# Patient Record
Sex: FEMALE | Race: WHITE | NOT HISPANIC OR LATINO | URBAN - METROPOLITAN AREA
[De-identification: names, ages, dates, MRNs, and addresses within clinical notes are randomized per-mention and may not be internally consistent; named-entity substitution may affect disease eponyms.]

---

## 2017-05-03 ENCOUNTER — EMERGENCY (EMERGENCY)
Facility: HOSPITAL | Age: 47
LOS: 0 days | Discharge: HOME | End: 2017-05-03
Attending: EMERGENCY MEDICINE | Admitting: EMERGENCY MEDICINE
Payer: COMMERCIAL

## 2017-05-03 VITALS
HEART RATE: 99 BPM | OXYGEN SATURATION: 95 % | RESPIRATION RATE: 20 BRPM | DIASTOLIC BLOOD PRESSURE: 95 MMHG | TEMPERATURE: 98 F | SYSTOLIC BLOOD PRESSURE: 160 MMHG

## 2017-05-03 VITALS — WEIGHT: 229.94 LBS
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
## 2017-05-03 DIAGNOSIS — S61.213A LACERATION WITHOUT FOREIGN BODY OF LEFT MIDDLE FINGER WITHOUT DAMAGE TO NAIL, INITIAL ENCOUNTER: ICD-10-CM
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
## 2017-05-03 DIAGNOSIS — S61.211A LACERATION WITHOUT FOREIGN BODY OF LEFT INDEX FINGER WITHOUT DAMAGE TO NAIL, INITIAL ENCOUNTER: ICD-10-CM

## 2017-05-03 PROCEDURE — 12002 RPR S/N/AX/GEN/TRNK2.6-7.5CM: CPT

## 2017-05-03 PROCEDURE — 99284 EMERGENCY DEPT VISIT MOD MDM: CPT | Mod: 25

## 2017-05-03 RX ORDER — TETANUS TOXOID, REDUCED DIPHTHERIA TOXOID AND ACELLULAR PERTUSSIS VACCINE, ADSORBED 5; 2.5; 8; 8; 2.5 [IU]/.5ML; [IU]/.5ML; UG/.5ML; UG/.5ML; UG/.5ML
0.5 SUSPENSION INTRAMUSCULAR ONCE
Qty: 0 | Refills: 0 | Status: COMPLETED | OUTPATIENT
Start: 2017-05-03 | End: 2017-05-03

## 2017-05-03 RX ADMIN — TETANUS TOXOID, REDUCED DIPHTHERIA TOXOID AND ACELLULAR PERTUSSIS VACCINE, ADSORBED 0.5 MILLILITER(S): 5; 2.5; 8; 8; 2.5 SUSPENSION INTRAMUSCULAR at 14:18

## 2017-05-03 NOTE — ED STATDOCS - SKIN, MLM
jagged 1 cm lac superficial to left index finger. jagged lac to palmar surface of left 3rd finger. NV intact. motor intact.

## 2017-05-03 NOTE — ED STATDOCS - ATTENDING CONTRIBUTION TO CARE
46f w 1cm lac to L fingers. Neurovascularly intact.  ICooper MD, personally saw the patient with the resident, and completed the key components of the history and physical exam. I then discussed the management plan with the resident.  I, Cooper Stephenson,, performed the initial face to face bedside interview with this patient regarding history of present illness, review of symptoms and relevant past medical, social and family history.  I completed an independent physical examination.  I was the initial provider who evaluated this patient.  The history, relevant review of systems, past medical and surgical history, medical decision making, and physical examination was documented by the scribe in my presence and I attest to the accuracy of the documentation.

## 2017-05-03 NOTE — ED STATDOCS - PROGRESS NOTE DETAILS
47 y/o female with lacerations in her L middle and index fingers. Patient reports mild pain and denies possibility of FO. Tdap given. Will repair lacerations and do wound dressing.

## 2017-05-03 NOTE — ED STATDOCS - OBJECTIVE STATEMENT
47 y/o female presenting to ED for lacerations on left hand when doing gardening work with a tool. Last tetanus is unknown. Pt c/o left hand pain. 45 y/o female presenting to ED for lacerations on left hand when doing gardening work with a tool x1 hour ago. Last tetanus is unknown. Pt c/o left hand pain.

## 2017-05-03 NOTE — ED STATDOCS - NS ED MD SCRIBE ATTENDING SCRIBE SECTIONS
PROGRESS NOTE/PAST MEDICAL/SURGICAL/SOCIAL HISTORY/DISPOSITION/REVIEW OF SYSTEMS/HISTORY OF PRESENT ILLNESS/PHYSICAL EXAM/RESULTS

## 2017-10-18 ENCOUNTER — EMERGENCY (EMERGENCY)
Facility: HOSPITAL | Age: 47
LOS: 0 days | Discharge: HOME | End: 2017-10-18
Attending: EMERGENCY MEDICINE | Admitting: EMERGENCY MEDICINE
Payer: COMMERCIAL

## 2017-10-18 VITALS
SYSTOLIC BLOOD PRESSURE: 142 MMHG | HEIGHT: 63 IN | TEMPERATURE: 98 F | DIASTOLIC BLOOD PRESSURE: 72 MMHG | RESPIRATION RATE: 18 BRPM | OXYGEN SATURATION: 100 % | WEIGHT: 225.09 LBS | HEART RATE: 88 BPM

## 2017-10-18 DIAGNOSIS — S61.511A LACERATION WITHOUT FOREIGN BODY OF RIGHT WRIST, INITIAL ENCOUNTER: ICD-10-CM
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
## 2017-10-18 DIAGNOSIS — F17.210 NICOTINE DEPENDENCE, CIGARETTES, UNCOMPLICATED: ICD-10-CM
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
## 2017-10-18 PROCEDURE — 99283 EMERGENCY DEPT VISIT LOW MDM: CPT | Mod: 25

## 2017-10-18 PROCEDURE — 12001 RPR S/N/AX/GEN/TRNK 2.5CM/<: CPT

## 2017-10-18 NOTE — ED ADULT NURSE NOTE - OBJECTIVE STATEMENT
Pt presented to ED for hand injury. As per pt, pt cut her right palm with unknown object while taking out the garbage tonight. Upon arrival to ED, pt c/o minor pain to the right palm, and 1cm lac with controlled bleeding noted to right palm. No other complains noted at this time. Pt's Tetanus up-to-date.

## 2017-10-18 NOTE — ED ADULT NURSE NOTE - CHPI ED SYMPTOMS NEG
no numbness/no dizziness/no fever/no nausea/no tingling/no chills/no vomiting/no weakness/no decreased eating/drinking

## 2017-10-18 NOTE — ED PROVIDER NOTE - OBJECTIVE STATEMENT
48 yo female presents c/o lac to right hand on glass while taking out the garbage.  States she stopped the bleeding for a short while but it resumed when she moved her hand.  Had recent tdap 5 months ago.  Pt is right hand dominant.  No other injuries.  No recent travel.  PCP Dr. Gamble.

## 2018-04-05 ENCOUNTER — APPOINTMENT (OUTPATIENT)
Dept: BREAST CENTER | Facility: CLINIC | Age: 48
End: 2018-04-05
Payer: COMMERCIAL

## 2018-04-05 VITALS
SYSTOLIC BLOOD PRESSURE: 140 MMHG | BODY MASS INDEX: 39.47 KG/M2 | HEART RATE: 74 BPM | HEIGHT: 62.5 IN | DIASTOLIC BLOOD PRESSURE: 90 MMHG | WEIGHT: 220 LBS

## 2018-04-05 DIAGNOSIS — N63.10 UNSPECIFIED LUMP IN THE RIGHT BREAST, UNSPECIFIED QUADRANT: ICD-10-CM

## 2018-04-05 PROCEDURE — 99242 OFF/OP CONSLTJ NEW/EST SF 20: CPT

## 2018-04-05 RX ORDER — OXYCODONE HYDROCHLORIDE 15 MG/1
15 TABLET ORAL
Qty: 150 | Refills: 0 | Status: ACTIVE | COMMUNITY
Start: 2018-03-12

## 2018-05-10 RX ORDER — METHOCARBAMOL 750 MG/1
750 TABLET, FILM COATED ORAL
Qty: 60 | Refills: 0 | Status: DISCONTINUED | COMMUNITY
Start: 2018-02-09 | End: 2018-05-10

## 2018-05-10 RX ORDER — ETODOLAC 500 MG/1
500 TABLET, FILM COATED ORAL
Qty: 60 | Refills: 0 | Status: DISCONTINUED | COMMUNITY
Start: 2018-01-05 | End: 2018-05-10

## 2018-05-10 RX ORDER — ALPRAZOLAM 0.25 MG/1
0.25 TABLET ORAL
Refills: 0 | Status: ACTIVE | COMMUNITY

## 2018-05-10 RX ORDER — ETODOLAC 400 MG/1
400 TABLET, FILM COATED ORAL
Qty: 60 | Refills: 0 | Status: DISCONTINUED | COMMUNITY
Start: 2017-07-19 | End: 2018-05-10

## 2018-05-15 NOTE — ASU PATIENT PROFILE, ADULT - PSH
No significant past surgical history Breast cyst  right  (x2)     (last one)  Carpal tunnel syndrome of left wrist  2006  Carpal tunnel syndrome of right wrist    H/O umbilical hernia repair  2017  H/O: hysterectomy  2017  History of cholecystectomy  1992  History of lumbar fusion    S/P   ,

## 2018-05-16 ENCOUNTER — APPOINTMENT (OUTPATIENT)
Dept: BREAST CENTER | Facility: HOSPITAL | Age: 48
End: 2018-05-16
Payer: COMMERCIAL

## 2018-05-16 ENCOUNTER — RESULT REVIEW (OUTPATIENT)
Age: 48
End: 2018-05-16

## 2018-05-16 ENCOUNTER — OUTPATIENT (OUTPATIENT)
Dept: PREADMISSION | Facility: HOSPITAL | Age: 48
LOS: 1 days | Discharge: HOME | End: 2018-05-16
Payer: COMMERCIAL

## 2018-05-16 VITALS
TEMPERATURE: 98 F | DIASTOLIC BLOOD PRESSURE: 82 MMHG | OXYGEN SATURATION: 98 % | HEART RATE: 82 BPM | RESPIRATION RATE: 18 BRPM | SYSTOLIC BLOOD PRESSURE: 122 MMHG

## 2018-05-16 VITALS
HEIGHT: 62.5 IN | WEIGHT: 229.94 LBS | DIASTOLIC BLOOD PRESSURE: 75 MMHG | OXYGEN SATURATION: 97 % | TEMPERATURE: 98 F | RESPIRATION RATE: 16 BRPM | HEART RATE: 75 BPM | SYSTOLIC BLOOD PRESSURE: 115 MMHG

## 2018-05-16 DIAGNOSIS — Z90.710 ACQUIRED ABSENCE OF BOTH CERVIX AND UTERUS: Chronic | ICD-10-CM

## 2018-05-16 DIAGNOSIS — Z98.1 ARTHRODESIS STATUS: Chronic | ICD-10-CM

## 2018-05-16 DIAGNOSIS — G56.02 CARPAL TUNNEL SYNDROME, LEFT UPPER LIMB: Chronic | ICD-10-CM

## 2018-05-16 DIAGNOSIS — Z98.891 HISTORY OF UTERINE SCAR FROM PREVIOUS SURGERY: Chronic | ICD-10-CM

## 2018-05-16 DIAGNOSIS — N60.09 SOLITARY CYST OF UNSPECIFIED BREAST: Chronic | ICD-10-CM

## 2018-05-16 DIAGNOSIS — Z90.49 ACQUIRED ABSENCE OF OTHER SPECIFIED PARTS OF DIGESTIVE TRACT: Chronic | ICD-10-CM

## 2018-05-16 DIAGNOSIS — G56.01 CARPAL TUNNEL SYNDROME, RIGHT UPPER LIMB: Chronic | ICD-10-CM

## 2018-05-16 DIAGNOSIS — Z98.890 OTHER SPECIFIED POSTPROCEDURAL STATES: Chronic | ICD-10-CM

## 2018-05-16 PROCEDURE — 19120 REMOVAL OF BREAST LESION: CPT

## 2018-05-16 PROCEDURE — 88304 TISSUE EXAM BY PATHOLOGIST: CPT | Mod: 26

## 2018-05-16 PROCEDURE — 14000 TIS TRNFR TRUNK 10 SQ CM/<: CPT

## 2018-05-16 RX ORDER — FENTANYL CITRATE 50 UG/ML
50 INJECTION INTRAVENOUS
Qty: 0 | Refills: 0 | Status: DISCONTINUED | OUTPATIENT
Start: 2018-05-16 | End: 2018-05-16

## 2018-05-16 RX ORDER — ONDANSETRON 8 MG/1
4 TABLET, FILM COATED ORAL ONCE
Qty: 0 | Refills: 0 | Status: DISCONTINUED | OUTPATIENT
Start: 2018-05-16 | End: 2018-05-16

## 2018-05-16 RX ORDER — HYDROMORPHONE HYDROCHLORIDE 2 MG/ML
0.5 INJECTION INTRAMUSCULAR; INTRAVENOUS; SUBCUTANEOUS
Qty: 0 | Refills: 0 | Status: DISCONTINUED | OUTPATIENT
Start: 2018-05-16 | End: 2018-05-16

## 2018-05-16 RX ORDER — AMITRIPTYLINE HCL 25 MG
1 TABLET ORAL
Qty: 0 | Refills: 0 | COMMUNITY

## 2018-05-16 RX ORDER — SODIUM CHLORIDE 9 MG/ML
1000 INJECTION, SOLUTION INTRAVENOUS
Qty: 0 | Refills: 0 | Status: DISCONTINUED | OUTPATIENT
Start: 2018-05-16 | End: 2018-05-16

## 2018-05-16 RX ORDER — OXYCODONE HYDROCHLORIDE 5 MG/1
15 TABLET ORAL EVERY 6 HOURS
Qty: 0 | Refills: 0 | Status: DISCONTINUED | OUTPATIENT
Start: 2018-05-16 | End: 2018-05-16

## 2018-05-16 RX ORDER — GABAPENTIN 400 MG/1
1 CAPSULE ORAL
Qty: 0 | Refills: 0 | COMMUNITY

## 2018-05-16 RX ADMIN — OXYCODONE HYDROCHLORIDE 15 MILLIGRAM(S): 5 TABLET ORAL at 14:50

## 2018-05-16 RX ADMIN — HYDROMORPHONE HYDROCHLORIDE 0.5 MILLIGRAM(S): 2 INJECTION INTRAMUSCULAR; INTRAVENOUS; SUBCUTANEOUS at 14:00

## 2018-05-16 RX ADMIN — HYDROMORPHONE HYDROCHLORIDE 0.5 MILLIGRAM(S): 2 INJECTION INTRAMUSCULAR; INTRAVENOUS; SUBCUTANEOUS at 13:47

## 2018-05-16 RX ADMIN — HYDROMORPHONE HYDROCHLORIDE 0.5 MILLIGRAM(S): 2 INJECTION INTRAMUSCULAR; INTRAVENOUS; SUBCUTANEOUS at 14:50

## 2018-05-16 RX ADMIN — HYDROMORPHONE HYDROCHLORIDE 0.5 MILLIGRAM(S): 2 INJECTION INTRAMUSCULAR; INTRAVENOUS; SUBCUTANEOUS at 13:57

## 2018-05-16 RX ADMIN — OXYCODONE HYDROCHLORIDE 15 MILLIGRAM(S): 5 TABLET ORAL at 13:48

## 2018-05-16 NOTE — ASU DISCHARGE PLAN (ADULT/PEDIATRIC). - MEDICATION SUMMARY - MEDICATIONS TO STOP TAKING
I will STOP taking the medications listed below when I get home from the hospital:    Percocet 10/325 oral tablet  -- 1 tab(s) by mouth every 6 hours, As Needed

## 2018-05-16 NOTE — ASU DISCHARGE PLAN (ADULT/PEDIATRIC). - NURSING INSTRUCTIONS
Patient to refrain from Aspirin, Motrin and Aleve Patient to refrain from Aspirin, Motrin and Aleve  Begin with liquids and light food ( tea, toast, Jello, soups). Advance to what you normally eat. Liquids should taken in adequate amounts today.

## 2018-05-16 NOTE — ASU DISCHARGE PLAN (ADULT/PEDIATRIC). - MEDICATION SUMMARY - MEDICATIONS TO TAKE
I will START or STAY ON the medications listed below when I get home from the hospital:    oxyCODONE 15 mg oral tablet  -- orally every 4 hours, As Needed  -- Indication: For pain    Compazine  -- 4 milligram(s) by mouth once a day, As Needed  -- Indication: For Constipation    Xanax 0.5 mg oral tablet  -- 1 tab(s) by mouth 2 times a day  -- Indication: For Anxiety

## 2018-05-16 NOTE — BRIEF OPERATIVE NOTE - PROCEDURE
<<-----Click on this checkbox to enter Procedure Wide excision lesion of torso  05/16/2018  right breast/chest wall  Active  AMISHKIT

## 2018-05-21 LAB — SURGICAL PATHOLOGY FINAL REPORT - CH: SIGNIFICANT CHANGE UP

## 2018-05-23 PROBLEM — D17.1 LIPOMA OF BREAST: Status: ACTIVE | Noted: 2018-05-23

## 2018-05-24 ENCOUNTER — APPOINTMENT (OUTPATIENT)
Dept: BREAST CENTER | Facility: CLINIC | Age: 48
End: 2018-05-24
Payer: COMMERCIAL

## 2018-05-24 DIAGNOSIS — M79.7 FIBROMYALGIA: ICD-10-CM

## 2018-05-24 DIAGNOSIS — N63.10 UNSPECIFIED LUMP IN THE RIGHT BREAST, UNSPECIFIED QUADRANT: ICD-10-CM

## 2018-05-24 DIAGNOSIS — M19.90 UNSPECIFIED OSTEOARTHRITIS, UNSPECIFIED SITE: ICD-10-CM

## 2018-05-24 DIAGNOSIS — G89.29 OTHER CHRONIC PAIN: ICD-10-CM

## 2018-05-24 DIAGNOSIS — F17.200 NICOTINE DEPENDENCE, UNSPECIFIED, UNCOMPLICATED: ICD-10-CM

## 2018-05-24 DIAGNOSIS — E66.01 MORBID (SEVERE) OBESITY DUE TO EXCESS CALORIES: ICD-10-CM

## 2018-05-24 DIAGNOSIS — D17.1 BENIGN LIPOMATOUS NEOPLASM OF SKIN AND SUBCUTANEOUS TISSUE OF TRUNK: ICD-10-CM

## 2018-05-24 DIAGNOSIS — Z80.1 FAMILY HISTORY OF MALIGNANT NEOPLASM OF TRACHEA, BRONCHUS AND LUNG: ICD-10-CM

## 2018-05-24 DIAGNOSIS — Z82.49 FAMILY HISTORY OF ISCHEMIC HEART DISEASE AND OTHER DISEASES OF THE CIRCULATORY SYSTEM: ICD-10-CM

## 2018-05-24 DIAGNOSIS — M54.5 LOW BACK PAIN: ICD-10-CM

## 2018-05-24 DIAGNOSIS — F41.9 ANXIETY DISORDER, UNSPECIFIED: ICD-10-CM

## 2018-05-24 PROCEDURE — 99024 POSTOP FOLLOW-UP VISIT: CPT

## 2018-12-12 NOTE — ED ADULT NURSE NOTE - ATTEMPT TO OOB
----- Message from Izabela Shrestha RN sent at 12/12/2018  9:08 AM CST -----  Please call patient to let her know.   Thanks.  ----- Message -----  From: Emmanuel MORRISON DO  Sent: 12/11/2018   5:24 PM  To: , #    ldl at goal, cpm     no

## 2019-01-31 ENCOUNTER — EMERGENCY (EMERGENCY)
Facility: HOSPITAL | Age: 49
LOS: 0 days | Discharge: HOME | End: 2019-01-31
Attending: EMERGENCY MEDICINE | Admitting: EMERGENCY MEDICINE
Payer: COMMERCIAL

## 2019-01-31 VITALS
HEART RATE: 97 BPM | SYSTOLIC BLOOD PRESSURE: 150 MMHG | RESPIRATION RATE: 18 BRPM | OXYGEN SATURATION: 97 % | DIASTOLIC BLOOD PRESSURE: 94 MMHG | TEMPERATURE: 98 F

## 2019-01-31 VITALS — WEIGHT: 220.02 LBS

## 2019-01-31 DIAGNOSIS — Z90.710 ACQUIRED ABSENCE OF BOTH CERVIX AND UTERUS: ICD-10-CM

## 2019-01-31 DIAGNOSIS — R11.2 NAUSEA WITH VOMITING, UNSPECIFIED: ICD-10-CM

## 2019-01-31 DIAGNOSIS — Z88.5 ALLERGY STATUS TO NARCOTIC AGENT: ICD-10-CM

## 2019-01-31 DIAGNOSIS — M79.7 FIBROMYALGIA: ICD-10-CM

## 2019-01-31 DIAGNOSIS — Z98.890 OTHER SPECIFIED POSTPROCEDURAL STATES: Chronic | ICD-10-CM

## 2019-01-31 DIAGNOSIS — G56.01 CARPAL TUNNEL SYNDROME, RIGHT UPPER LIMB: Chronic | ICD-10-CM

## 2019-01-31 DIAGNOSIS — Z90.49 ACQUIRED ABSENCE OF OTHER SPECIFIED PARTS OF DIGESTIVE TRACT: Chronic | ICD-10-CM

## 2019-01-31 DIAGNOSIS — F41.9 ANXIETY DISORDER, UNSPECIFIED: ICD-10-CM

## 2019-01-31 DIAGNOSIS — N60.09 SOLITARY CYST OF UNSPECIFIED BREAST: Chronic | ICD-10-CM

## 2019-01-31 DIAGNOSIS — E87.6 HYPOKALEMIA: ICD-10-CM

## 2019-01-31 DIAGNOSIS — R10.9 UNSPECIFIED ABDOMINAL PAIN: ICD-10-CM

## 2019-01-31 DIAGNOSIS — Z98.1 ARTHRODESIS STATUS: Chronic | ICD-10-CM

## 2019-01-31 DIAGNOSIS — G56.02 CARPAL TUNNEL SYNDROME, LEFT UPPER LIMB: Chronic | ICD-10-CM

## 2019-01-31 DIAGNOSIS — Z98.891 HISTORY OF UTERINE SCAR FROM PREVIOUS SURGERY: Chronic | ICD-10-CM

## 2019-01-31 DIAGNOSIS — Z90.710 ACQUIRED ABSENCE OF BOTH CERVIX AND UTERUS: Chronic | ICD-10-CM

## 2019-01-31 PROBLEM — N63.0 UNSPECIFIED LUMP IN UNSPECIFIED BREAST: Chronic | Status: ACTIVE | Noted: 2018-05-15

## 2019-01-31 PROBLEM — M19.90 UNSPECIFIED OSTEOARTHRITIS, UNSPECIFIED SITE: Chronic | Status: ACTIVE | Noted: 2018-05-15

## 2019-01-31 LAB
ALBUMIN SERPL ELPH-MCNC: 4.3 G/DL — SIGNIFICANT CHANGE UP (ref 3.3–5)
ALP SERPL-CCNC: 77 U/L — SIGNIFICANT CHANGE UP (ref 40–120)
ALT FLD-CCNC: 22 U/L — SIGNIFICANT CHANGE UP (ref 12–78)
ANION GAP SERPL CALC-SCNC: 7 MMOL/L — SIGNIFICANT CHANGE UP (ref 5–17)
APPEARANCE UR: ABNORMAL
AST SERPL-CCNC: 12 U/L — LOW (ref 15–37)
BACTERIA # UR AUTO: ABNORMAL
BASOPHILS # BLD AUTO: 0.04 K/UL — SIGNIFICANT CHANGE UP (ref 0–0.2)
BASOPHILS NFR BLD AUTO: 0.2 % — SIGNIFICANT CHANGE UP (ref 0–2)
BILIRUB SERPL-MCNC: 0.5 MG/DL — SIGNIFICANT CHANGE UP (ref 0.2–1.2)
BILIRUB UR-MCNC: NEGATIVE — SIGNIFICANT CHANGE UP
BUN SERPL-MCNC: 14 MG/DL — SIGNIFICANT CHANGE UP (ref 7–23)
CALCIUM SERPL-MCNC: 9.3 MG/DL — SIGNIFICANT CHANGE UP (ref 8.5–10.1)
CHLORIDE SERPL-SCNC: 104 MMOL/L — SIGNIFICANT CHANGE UP (ref 96–108)
CO2 SERPL-SCNC: 29 MMOL/L — SIGNIFICANT CHANGE UP (ref 22–31)
COLOR SPEC: YELLOW — SIGNIFICANT CHANGE UP
COMMENT - URINE: SIGNIFICANT CHANGE UP
CREAT SERPL-MCNC: 0.73 MG/DL — SIGNIFICANT CHANGE UP (ref 0.5–1.3)
DIFF PNL FLD: ABNORMAL
EOSINOPHIL # BLD AUTO: 0 K/UL — SIGNIFICANT CHANGE UP (ref 0–0.5)
EOSINOPHIL NFR BLD AUTO: 0 % — SIGNIFICANT CHANGE UP (ref 0–6)
EPI CELLS # UR: ABNORMAL
GLUCOSE SERPL-MCNC: 124 MG/DL — HIGH (ref 70–99)
GLUCOSE UR QL: NEGATIVE MG/DL — SIGNIFICANT CHANGE UP
HCT VFR BLD CALC: 45.3 % — HIGH (ref 34.5–45)
HGB BLD-MCNC: 15.4 G/DL — SIGNIFICANT CHANGE UP (ref 11.5–15.5)
IMM GRANULOCYTES NFR BLD AUTO: 0.6 % — SIGNIFICANT CHANGE UP (ref 0–1.5)
KETONES UR-MCNC: ABNORMAL
LEUKOCYTE ESTERASE UR-ACNC: ABNORMAL
LIDOCAIN IGE QN: 118 U/L — SIGNIFICANT CHANGE UP (ref 73–393)
LYMPHOCYTES # BLD AUTO: 10 % — LOW (ref 13–44)
LYMPHOCYTES # BLD AUTO: 2.2 K/UL — SIGNIFICANT CHANGE UP (ref 1–3.3)
MCHC RBC-ENTMCNC: 29.3 PG — SIGNIFICANT CHANGE UP (ref 27–34)
MCHC RBC-ENTMCNC: 34 GM/DL — SIGNIFICANT CHANGE UP (ref 32–36)
MCV RBC AUTO: 86.1 FL — SIGNIFICANT CHANGE UP (ref 80–100)
MONOCYTES # BLD AUTO: 1.32 K/UL — HIGH (ref 0–0.9)
MONOCYTES NFR BLD AUTO: 6 % — SIGNIFICANT CHANGE UP (ref 2–14)
NEUTROPHILS # BLD AUTO: 18.27 K/UL — HIGH (ref 1.8–7.4)
NEUTROPHILS NFR BLD AUTO: 83.2 % — HIGH (ref 43–77)
NITRITE UR-MCNC: NEGATIVE — SIGNIFICANT CHANGE UP
NRBC # BLD: 0 /100 WBCS — SIGNIFICANT CHANGE UP (ref 0–0)
PH UR: 5 — SIGNIFICANT CHANGE UP (ref 5–8)
PLATELET # BLD AUTO: 399 K/UL — SIGNIFICANT CHANGE UP (ref 150–400)
POTASSIUM SERPL-MCNC: 3 MMOL/L — LOW (ref 3.5–5.3)
POTASSIUM SERPL-SCNC: 3 MMOL/L — LOW (ref 3.5–5.3)
PROT SERPL-MCNC: 8.2 GM/DL — SIGNIFICANT CHANGE UP (ref 6–8.3)
PROT UR-MCNC: 30 MG/DL
RBC # BLD: 5.26 M/UL — HIGH (ref 3.8–5.2)
RBC # FLD: 13.5 % — SIGNIFICANT CHANGE UP (ref 10.3–14.5)
RBC CASTS # UR COMP ASSIST: SIGNIFICANT CHANGE UP /HPF (ref 0–4)
SODIUM SERPL-SCNC: 140 MMOL/L — SIGNIFICANT CHANGE UP (ref 135–145)
SP GR SPEC: 1.02 — SIGNIFICANT CHANGE UP (ref 1.01–1.02)
UROBILINOGEN FLD QL: 1 MG/DL
WBC # BLD: 21.96 K/UL — HIGH (ref 3.8–10.5)
WBC # FLD AUTO: 21.96 K/UL — HIGH (ref 3.8–10.5)
WBC UR QL: SIGNIFICANT CHANGE UP

## 2019-01-31 PROCEDURE — 99285 EMERGENCY DEPT VISIT HI MDM: CPT | Mod: 25

## 2019-01-31 PROCEDURE — 74177 CT ABD & PELVIS W/CONTRAST: CPT | Mod: 26

## 2019-01-31 RX ORDER — ONDANSETRON 8 MG/1
4 TABLET, FILM COATED ORAL ONCE
Qty: 0 | Refills: 0 | Status: DISCONTINUED | OUTPATIENT
Start: 2019-01-31 | End: 2019-01-31

## 2019-01-31 RX ORDER — SODIUM CHLORIDE 9 MG/ML
1000 INJECTION, SOLUTION INTRAVENOUS
Qty: 0 | Refills: 0 | Status: DISCONTINUED | OUTPATIENT
Start: 2019-01-31 | End: 2019-01-31

## 2019-01-31 RX ORDER — SODIUM CHLORIDE 9 MG/ML
1000 INJECTION INTRAMUSCULAR; INTRAVENOUS; SUBCUTANEOUS ONCE
Qty: 0 | Refills: 0 | Status: COMPLETED | OUTPATIENT
Start: 2019-01-31 | End: 2019-01-31

## 2019-01-31 RX ORDER — POTASSIUM CHLORIDE 20 MEQ
10 PACKET (EA) ORAL
Qty: 0 | Refills: 0 | Status: COMPLETED | OUTPATIENT
Start: 2019-01-31 | End: 2019-01-31

## 2019-01-31 RX ORDER — DIPHENHYDRAMINE HCL 50 MG
25 CAPSULE ORAL ONCE
Qty: 0 | Refills: 0 | Status: COMPLETED | OUTPATIENT
Start: 2019-01-31 | End: 2019-01-31

## 2019-01-31 RX ORDER — PROCHLORPERAZINE MALEATE 5 MG
10 TABLET ORAL ONCE
Qty: 0 | Refills: 0 | Status: COMPLETED | OUTPATIENT
Start: 2019-01-31 | End: 2019-01-31

## 2019-01-31 RX ADMIN — SODIUM CHLORIDE 1000 MILLILITER(S): 9 INJECTION, SOLUTION INTRAVENOUS at 16:21

## 2019-01-31 RX ADMIN — Medication 25 MILLIGRAM(S): at 14:29

## 2019-01-31 RX ADMIN — Medication 10 MILLIEQUIVALENT(S): at 18:23

## 2019-01-31 RX ADMIN — SODIUM CHLORIDE 1000 MILLILITER(S): 9 INJECTION INTRAMUSCULAR; INTRAVENOUS; SUBCUTANEOUS at 13:55

## 2019-01-31 RX ADMIN — SODIUM CHLORIDE 1000 MILLILITER(S): 9 INJECTION INTRAMUSCULAR; INTRAVENOUS; SUBCUTANEOUS at 15:27

## 2019-01-31 RX ADMIN — Medication 100 MILLIEQUIVALENT(S): at 16:20

## 2019-01-31 RX ADMIN — Medication 10 MILLIGRAM(S): at 14:26

## 2019-01-31 RX ADMIN — Medication 101 MILLIGRAM(S): at 14:27

## 2019-01-31 RX ADMIN — SODIUM CHLORIDE 1000 MILLILITER(S): 9 INJECTION, SOLUTION INTRAVENOUS at 18:23

## 2019-01-31 NOTE — ED PROVIDER NOTE - MEDICAL DECISION MAKING DETAILS
Pt presenting with abd pain, NV, similar to multiple episodes of cyclical vomiting. Pt is well appearing, soft abd, no distention. Will order labs, hydrate, tx for cyclical vomiting and reassess. Will hold imaging and opioids at this time.

## 2019-01-31 NOTE — ED PROVIDER NOTE - NSFOLLOWUPINSTRUCTIONS_ED_ALL_ED_FT
1. return for worsening symptoms or anything concerning to you  2. take all home meds as prescribed  3. follow up with your pmd call to make an appointment  4. follow up with your GI call to make an appointment     Nausea / Vomiting    Nausea is the feeling that you have to vomit. As nausea gets worse, it can lead to vomiting. Vomiting puts you at an increased risk for dehydration. Older adults and people with other diseases or a weak immune system are at higher risk for dehydration. Drink clear fluids in small but frequent amounts as tolerated. Eat bland, easy-to-digest foods in small amounts as tolerated.    SEEK IMMEDIATE MEDICAL CARE IF YOU HAVE ANY OF THE FOLLOWING SYMPTOMS: fever, inability to keep sufficient fluids down, black or bloody vomitus, black or bloody stools, lightheadedness/dizziness, chest pain, severe headache, rash, shortness of breath, cold or clammy skin, confusion, pain with urination, or any signs of dehydration.

## 2019-01-31 NOTE — ED STATDOCS - PSH
Breast cyst  right  (x2)     (last one)  Carpal tunnel syndrome of left wrist  2006  Carpal tunnel syndrome of right wrist    H/O umbilical hernia repair  2017  H/O: hysterectomy  2017  History of cholecystectomy  1992  History of lumbar fusion    S/P   ,

## 2019-01-31 NOTE — ED STATDOCS - PROGRESS NOTE DETAILS
Rosie Mcneill for ED attending Dr. Nance: 49 y/o f with PMHx of anxiety, arthritis, fibromyalgia, s/p hysterectomy, cholecystectomy, umbilical hernia repair presenting to the ED c/o abd pain, n/v/d since last night. Spoke to GI: Dr. Childs today who recommended pt come to ED where he will see her. Hx of similar sx in the past. Denies fever, chills, any other acute c/o. Will send pt to main ED for further evaluation. Rosie Mcneill for ED attending Dr. Nance: 49 y/o f with PMHx of anxiety, arthritis, fibromyalgia, s/p hysterectomy, cholecystectomy, umbilical hernia repair presenting to the ED c/o abd pain, n/v/d since last night. Hx of cyclic vomiting with multiple admissions in past; Spoke to GI: Dr. Childs today who recommended pt come to ED where he will see her. Hx of similar sx in the past. Denies fever, chills, any other acute c/o. Will send pt to main ED for further evaluation.

## 2019-01-31 NOTE — ED ADULT NURSE NOTE - NSIMPLEMENTINTERV_GEN_ALL_ED
Implemented All Universal Safety Interventions:  [place] to call system. Call bell, personal items and telephone within reach. Instruct patient to call for assistance. Room bathroom lighting operational. Non-slip footwear when patient is off stretcher. Physically safe environment: no spills, clutter or unnecessary equipment. Stretcher in lowest position, wheels locked, appropriate side rails in place.

## 2019-01-31 NOTE — ED PROVIDER NOTE - CARE PLAN
Principal Discharge DX:	Vomiting  Assessment and plan of treatment:	1. return for worsening symptoms or anything concerning to you  2. take all home meds as prescribed  3. follow up with your pmd call to make an appointment  4. follow up with your GI call to make an appointment     Nausea / Vomiting    Nausea is the feeling that you have to vomit. As nausea gets worse, it can lead to vomiting. Vomiting puts you at an increased risk for dehydration. Older adults and people with other diseases or a weak immune system are at higher risk for dehydration. Drink clear fluids in small but frequent amounts as tolerated. Eat bland, easy-to-digest foods in small amounts as tolerated.    SEEK IMMEDIATE MEDICAL CARE IF YOU HAVE ANY OF THE FOLLOWING SYMPTOMS: fever, inability to keep sufficient fluids down, black or bloody vomitus, black or bloody stools, lightheadedness/dizziness, chest pain, severe headache, rash, shortness of breath, cold or clammy skin, confusion, pain with urination, or any signs of dehydration.

## 2019-01-31 NOTE — ED PROVIDER NOTE - OBJECTIVE STATEMENT
49 y/o female with PMHx of anxiety, arthritis, fibromyalgia, s/p hysterectomy, cholecystectomy, umbilical hernia repair presenting to the ED c/o abd pain, n/v since 7am yesterday morning. Hx of cyclic vomiting x 5 years with multiple admissions in past; Spoke to GI: Dr. Childs today who recommended pt come to ED where he will see her. Pt states that she has medications to help the nausea and pain, but hasn't been able to keep the medication down. Pt usually deals with the NV at home, but today her sister made her come in for evaluation. Sx are similar to past cyclical vomiting episodes. Last BM yesterday morning, no blood. Denies fever, chills, any other acute c/o. Pt on percocet for back pain, last dose that pt was able to keep down was 2 days ago. NKDA.

## 2019-01-31 NOTE — ED PROVIDER NOTE - PROGRESS NOTE DETAILS
PERLA Daniel attest that this documentation has been prepared under the direction and in the presence of TOM Sanchez Rosie BENITES for Dr. TOM Sanchez: Took sign out from Dr. Ware pending labs. Labs are back and showing hypokalemia and WBC count of 22. Spoke with pt who states sx are much worse than normal cyclical vomiting episodes. +abd diffuse TTP. Given pain and elevated WBC, will obtain CT. Pt agrees with pain. Will reassess ct unremarkable. patient feeling better. tolerating PO. VSS. will d/c with follow up gi and strict return precautions. Judson Sanchez M.D., Attending Physician I, Judson Sanchez MD,  performed the initial face to face bedside interview with this patient regarding history of present illness, review of symptoms and relevant past medical, social and family history.  I completed an independent physical examination.  I was the initial provider who evaluated this patient.  The history, relevant review of systems, past medical and surgical history, medical decision making, and physical examination was documented by the scribe in my presence and I attest to the accuracy of the documentation.

## 2019-02-01 LAB
CULTURE RESULTS: SIGNIFICANT CHANGE UP
SPECIMEN SOURCE: SIGNIFICANT CHANGE UP

## 2019-05-25 ENCOUNTER — EMERGENCY (EMERGENCY)
Facility: HOSPITAL | Age: 49
LOS: 0 days | Discharge: HOME | End: 2019-05-26
Attending: FAMILY MEDICINE | Admitting: FAMILY MEDICINE
Payer: COMMERCIAL

## 2019-05-25 VITALS — HEIGHT: 63 IN | WEIGHT: 214.95 LBS

## 2019-05-25 DIAGNOSIS — G56.01 CARPAL TUNNEL SYNDROME, RIGHT UPPER LIMB: Chronic | ICD-10-CM

## 2019-05-25 DIAGNOSIS — Z98.1 ARTHRODESIS STATUS: Chronic | ICD-10-CM
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
## 2019-05-25 DIAGNOSIS — R07.89 OTHER CHEST PAIN: ICD-10-CM

## 2019-05-25 DIAGNOSIS — N60.09 SOLITARY CYST OF UNSPECIFIED BREAST: Chronic | ICD-10-CM

## 2019-05-25 DIAGNOSIS — Z88.5 ALLERGY STATUS TO NARCOTIC AGENT: ICD-10-CM

## 2019-05-25 DIAGNOSIS — Z90.49 ACQUIRED ABSENCE OF OTHER SPECIFIED PARTS OF DIGESTIVE TRACT: Chronic | ICD-10-CM
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
## 2019-05-25 DIAGNOSIS — Z90.710 ACQUIRED ABSENCE OF BOTH CERVIX AND UTERUS: Chronic | ICD-10-CM

## 2019-05-25 DIAGNOSIS — G56.02 CARPAL TUNNEL SYNDROME, LEFT UPPER LIMB: Chronic | ICD-10-CM

## 2019-05-25 DIAGNOSIS — S22.41XA MULTIPLE FRACTURES OF RIBS, RIGHT SIDE, INITIAL ENCOUNTER FOR CLOSED FRACTURE: ICD-10-CM

## 2019-05-25 DIAGNOSIS — Z98.891 HISTORY OF UTERINE SCAR FROM PREVIOUS SURGERY: Chronic | ICD-10-CM

## 2019-05-25 DIAGNOSIS — Z98.890 OTHER SPECIFIED POSTPROCEDURAL STATES: Chronic | ICD-10-CM

## 2019-05-25 PROCEDURE — 99285 EMERGENCY DEPT VISIT HI MDM: CPT | Mod: 25

## 2019-05-25 NOTE — ED ADULT NURSE NOTE - OBJECTIVE STATEMENT
patient presents to the ed complaining of rib pain after falling last night. patient a/w is patent and is in no acute distress or having any difficulty breathing.

## 2019-05-25 NOTE — ED ADULT NURSE NOTE - NSIMPLEMENTINTERV_GEN_ALL_ED
Implemented All Universal Safety Interventions:  [place] to call system. Call bell, personal items and telephone within reach. Instruct patient to call for assistance. Room bathroom lighting operational. Non-slip footwear when patient is off stretcher. Physically safe environment: no spills, clutter or unnecessary equipment. Stretcher in lowest position, wheels locked, appropriate side rails in place.

## 2019-05-25 NOTE — ED ADULT NURSE NOTE - CHPI ED NUR SYMPTOMS NEG
no nausea/no fever/no chills/no decreased eating/drinking/no dizziness/no vomiting/no tingling/no weakness

## 2019-05-25 NOTE — ED ADULT NURSE NOTE - CAS ELECT INFOMATION PROVIDED
DC instructions/Patient agreed to all verbal and written discharge instructions. Patient agreed to follow up with PCP/PMD. Patient education preformed on signs/symptoms to return to the ED. Patient is alert, orientented, and ambulatory at time of discharge

## 2019-05-25 NOTE — ED ADULT TRIAGE NOTE - CHIEF COMPLAINT QUOTE
pt presents to ED with complaints of right rib pain s/p fall at 2200 last night. pt states pain has been getting progressively worsening. no meds PTA.

## 2019-05-26 VITALS
HEART RATE: 98 BPM | OXYGEN SATURATION: 96 % | RESPIRATION RATE: 16 BRPM | SYSTOLIC BLOOD PRESSURE: 151 MMHG | DIASTOLIC BLOOD PRESSURE: 52 MMHG

## 2019-05-26 PROBLEM — G43.A0 CYCLICAL VOMITING, IN MIGRAINE, NOT INTRACTABLE: Chronic | Status: ACTIVE | Noted: 2019-02-01

## 2019-05-26 LAB
ALBUMIN SERPL ELPH-MCNC: 3.7 G/DL — SIGNIFICANT CHANGE UP (ref 3.3–5)
ALP SERPL-CCNC: 76 U/L — SIGNIFICANT CHANGE UP (ref 40–120)
ALT FLD-CCNC: 18 U/L — SIGNIFICANT CHANGE UP (ref 12–78)
ANION GAP SERPL CALC-SCNC: 7 MMOL/L — SIGNIFICANT CHANGE UP (ref 5–17)
APPEARANCE UR: CLEAR — SIGNIFICANT CHANGE UP
APTT BLD: 33.7 SEC — SIGNIFICANT CHANGE UP (ref 27.5–36.3)
AST SERPL-CCNC: 12 U/L — LOW (ref 15–37)
BACTERIA # UR AUTO: ABNORMAL
BASOPHILS # BLD AUTO: 0.04 K/UL — SIGNIFICANT CHANGE UP (ref 0–0.2)
BASOPHILS NFR BLD AUTO: 0.3 % — SIGNIFICANT CHANGE UP (ref 0–2)
BILIRUB SERPL-MCNC: 0.3 MG/DL — SIGNIFICANT CHANGE UP (ref 0.2–1.2)
BILIRUB UR-MCNC: NEGATIVE — SIGNIFICANT CHANGE UP
BLD GP AB SCN SERPL QL: SIGNIFICANT CHANGE UP
BUN SERPL-MCNC: 13 MG/DL — SIGNIFICANT CHANGE UP (ref 7–23)
CALCIUM SERPL-MCNC: 8.8 MG/DL — SIGNIFICANT CHANGE UP (ref 8.5–10.1)
CHLORIDE SERPL-SCNC: 106 MMOL/L — SIGNIFICANT CHANGE UP (ref 96–108)
CO2 SERPL-SCNC: 30 MMOL/L — SIGNIFICANT CHANGE UP (ref 22–31)
COLOR SPEC: YELLOW — SIGNIFICANT CHANGE UP
CREAT SERPL-MCNC: 0.76 MG/DL — SIGNIFICANT CHANGE UP (ref 0.5–1.3)
DIFF PNL FLD: NEGATIVE — SIGNIFICANT CHANGE UP
EOSINOPHIL # BLD AUTO: 0.27 K/UL — SIGNIFICANT CHANGE UP (ref 0–0.5)
EOSINOPHIL NFR BLD AUTO: 2.3 % — SIGNIFICANT CHANGE UP (ref 0–6)
EPI CELLS # UR: ABNORMAL
GLUCOSE SERPL-MCNC: 107 MG/DL — HIGH (ref 70–99)
GLUCOSE UR QL: NEGATIVE MG/DL — SIGNIFICANT CHANGE UP
HCT VFR BLD CALC: 40.6 % — SIGNIFICANT CHANGE UP (ref 34.5–45)
HGB BLD-MCNC: 13.4 G/DL — SIGNIFICANT CHANGE UP (ref 11.5–15.5)
IMM GRANULOCYTES NFR BLD AUTO: 0.7 % — SIGNIFICANT CHANGE UP (ref 0–1.5)
INR BLD: 0.99 RATIO — SIGNIFICANT CHANGE UP (ref 0.88–1.16)
KETONES UR-MCNC: NEGATIVE — SIGNIFICANT CHANGE UP
LEUKOCYTE ESTERASE UR-ACNC: ABNORMAL
LYMPHOCYTES # BLD AUTO: 28.4 % — SIGNIFICANT CHANGE UP (ref 13–44)
LYMPHOCYTES # BLD AUTO: 3.41 K/UL — HIGH (ref 1–3.3)
MCHC RBC-ENTMCNC: 29.6 PG — SIGNIFICANT CHANGE UP (ref 27–34)
MCHC RBC-ENTMCNC: 33 GM/DL — SIGNIFICANT CHANGE UP (ref 32–36)
MCV RBC AUTO: 89.6 FL — SIGNIFICANT CHANGE UP (ref 80–100)
MONOCYTES # BLD AUTO: 0.78 K/UL — SIGNIFICANT CHANGE UP (ref 0–0.9)
MONOCYTES NFR BLD AUTO: 6.5 % — SIGNIFICANT CHANGE UP (ref 2–14)
NEUTROPHILS # BLD AUTO: 7.41 K/UL — HIGH (ref 1.8–7.4)
NEUTROPHILS NFR BLD AUTO: 61.8 % — SIGNIFICANT CHANGE UP (ref 43–77)
NITRITE UR-MCNC: NEGATIVE — SIGNIFICANT CHANGE UP
PH UR: 5 — SIGNIFICANT CHANGE UP (ref 5–8)
PLATELET # BLD AUTO: 297 K/UL — SIGNIFICANT CHANGE UP (ref 150–400)
POTASSIUM SERPL-MCNC: 4.1 MMOL/L — SIGNIFICANT CHANGE UP (ref 3.5–5.3)
POTASSIUM SERPL-SCNC: 4.1 MMOL/L — SIGNIFICANT CHANGE UP (ref 3.5–5.3)
PROT SERPL-MCNC: 6.8 GM/DL — SIGNIFICANT CHANGE UP (ref 6–8.3)
PROT UR-MCNC: NEGATIVE MG/DL — SIGNIFICANT CHANGE UP
PROTHROM AB SERPL-ACNC: 11 SEC — SIGNIFICANT CHANGE UP (ref 10–12.9)
RBC # BLD: 4.53 M/UL — SIGNIFICANT CHANGE UP (ref 3.8–5.2)
RBC # FLD: 13 % — SIGNIFICANT CHANGE UP (ref 10.3–14.5)
RBC CASTS # UR COMP ASSIST: SIGNIFICANT CHANGE UP /HPF (ref 0–4)
SODIUM SERPL-SCNC: 143 MMOL/L — SIGNIFICANT CHANGE UP (ref 135–145)
SP GR SPEC: 1.01 — SIGNIFICANT CHANGE UP (ref 1.01–1.02)
TYPE + AB SCN PNL BLD: SIGNIFICANT CHANGE UP
UROBILINOGEN FLD QL: NEGATIVE MG/DL — SIGNIFICANT CHANGE UP
WBC # BLD: 11.99 K/UL — HIGH (ref 3.8–10.5)
WBC # FLD AUTO: 11.99 K/UL — HIGH (ref 3.8–10.5)
WBC UR QL: SIGNIFICANT CHANGE UP

## 2019-05-26 PROCEDURE — 71260 CT THORAX DX C+: CPT | Mod: 26

## 2019-05-26 PROCEDURE — 74177 CT ABD & PELVIS W/CONTRAST: CPT | Mod: 26

## 2019-05-26 RX ORDER — ONDANSETRON 8 MG/1
4 TABLET, FILM COATED ORAL ONCE
Refills: 0 | Status: COMPLETED | OUTPATIENT
Start: 2019-05-26 | End: 2019-05-26

## 2019-05-26 RX ORDER — HYDROMORPHONE HYDROCHLORIDE 2 MG/ML
1 INJECTION INTRAMUSCULAR; INTRAVENOUS; SUBCUTANEOUS ONCE
Refills: 0 | Status: DISCONTINUED | OUTPATIENT
Start: 2019-05-26 | End: 2019-05-26

## 2019-05-26 RX ORDER — OXYCODONE HYDROCHLORIDE 5 MG/1
10 TABLET ORAL ONCE
Refills: 0 | Status: DISCONTINUED | OUTPATIENT
Start: 2019-05-26 | End: 2019-05-26

## 2019-05-26 RX ORDER — LIDOCAINE 4 G/100G
1 CREAM TOPICAL ONCE
Refills: 0 | Status: COMPLETED | OUTPATIENT
Start: 2019-05-26 | End: 2019-05-26

## 2019-05-26 RX ORDER — SODIUM CHLORIDE 9 MG/ML
1000 INJECTION INTRAMUSCULAR; INTRAVENOUS; SUBCUTANEOUS ONCE
Refills: 0 | Status: COMPLETED | OUTPATIENT
Start: 2019-05-26 | End: 2019-05-26

## 2019-05-26 RX ADMIN — ONDANSETRON 4 MILLIGRAM(S): 8 TABLET, FILM COATED ORAL at 00:16

## 2019-05-26 RX ADMIN — HYDROMORPHONE HYDROCHLORIDE 1 MILLIGRAM(S): 2 INJECTION INTRAMUSCULAR; INTRAVENOUS; SUBCUTANEOUS at 00:16

## 2019-05-26 RX ADMIN — OXYCODONE HYDROCHLORIDE 10 MILLIGRAM(S): 5 TABLET ORAL at 03:09

## 2019-05-26 RX ADMIN — LIDOCAINE 1 PATCH: 4 CREAM TOPICAL at 03:22

## 2019-05-26 RX ADMIN — HYDROMORPHONE HYDROCHLORIDE 1 MILLIGRAM(S): 2 INJECTION INTRAMUSCULAR; INTRAVENOUS; SUBCUTANEOUS at 00:24

## 2019-05-26 RX ADMIN — SODIUM CHLORIDE 1000 MILLILITER(S): 9 INJECTION INTRAMUSCULAR; INTRAVENOUS; SUBCUTANEOUS at 00:24

## 2019-05-26 RX ADMIN — SODIUM CHLORIDE 1000 MILLILITER(S): 9 INJECTION INTRAMUSCULAR; INTRAVENOUS; SUBCUTANEOUS at 01:25

## 2019-05-26 NOTE — ED PROVIDER NOTE - CARE PROVIDER_API CALL
Durga Gamble (DO)  Family Medicine  [address] [place] [address], [place] [address]  Phone: [phone]  Fax: [phone]  Follow Up Time:

## 2019-05-26 NOTE — ED PROVIDER NOTE - NSFOLLOWUPINSTRUCTIONS_ED_ALL_ED_FT
See printout of information on Rib fractures.  Continue pain medications already prescribed to you for chronic pain.  Add lidoderm patches every 12 hours as needed for pain.  Use incentive spirometer 10 times a day for 5 minutes as educated.

## 2019-05-26 NOTE — ED PROVIDER NOTE - PROGRESS NOTE DETAILS
JG:  Received signout from Dr. Aguilar to follow up CT readings.  Pt. with 2 nondisplaced rib fractures.  Patient still with pain but better controlled.  Pt. already prescribed oxycodone and will continue this and will add lidoderm patches and incentive spirometry exercises and see her PMD Dr. Gamble.

## 2019-05-26 NOTE — ED PROVIDER NOTE - CLINICAL SUMMARY MEDICAL DECISION MAKING FREE TEXT BOX
Pt with mechanical fall over dog yesterday already on pain management no head injury or loc now with severee right rib pain and ruq pain. CT, labs, pain meds.

## 2019-05-26 NOTE — ED PROVIDER NOTE - OBJECTIVE STATEMENT
49 yo wf with hx herniated discs on pain management, who fell over her big dog last pm onto her right side. Pt states no loc but felt pain in right lower ribs, rightupper abd area. No n/v/fever. Pain inc with breathing coughing movig pain is sever constant.  Took pain management med prior to arrival with little relief. Nonsmoker nondrinker no illicit drugs.

## 2019-06-03 ENCOUNTER — EMERGENCY (EMERGENCY)
Facility: HOSPITAL | Age: 49
LOS: 0 days | Discharge: HOME | End: 2019-06-03
Attending: EMERGENCY MEDICINE | Admitting: EMERGENCY MEDICINE
Payer: COMMERCIAL

## 2019-06-03 VITALS
SYSTOLIC BLOOD PRESSURE: 139 MMHG | HEIGHT: 63 IN | OXYGEN SATURATION: 95 % | HEART RATE: 100 BPM | RESPIRATION RATE: 16 BRPM | TEMPERATURE: 99 F | WEIGHT: 225.09 LBS | DIASTOLIC BLOOD PRESSURE: 88 MMHG

## 2019-06-03 DIAGNOSIS — G56.01 CARPAL TUNNEL SYNDROME, RIGHT UPPER LIMB: Chronic | ICD-10-CM

## 2019-06-03 DIAGNOSIS — N60.09 SOLITARY CYST OF UNSPECIFIED BREAST: Chronic | ICD-10-CM
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
## 2019-06-03 DIAGNOSIS — G56.02 CARPAL TUNNEL SYNDROME, LEFT UPPER LIMB: Chronic | ICD-10-CM

## 2019-06-03 DIAGNOSIS — Z90.710 ACQUIRED ABSENCE OF BOTH CERVIX AND UTERUS: Chronic | ICD-10-CM
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
## 2019-06-03 DIAGNOSIS — S60.471A: ICD-10-CM

## 2019-06-03 DIAGNOSIS — Z98.1 ARTHRODESIS STATUS: Chronic | ICD-10-CM

## 2019-06-03 DIAGNOSIS — Z98.891 HISTORY OF UTERINE SCAR FROM PREVIOUS SURGERY: Chronic | ICD-10-CM

## 2019-06-03 DIAGNOSIS — Z90.49 ACQUIRED ABSENCE OF OTHER SPECIFIED PARTS OF DIGESTIVE TRACT: Chronic | ICD-10-CM
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
## 2019-06-03 DIAGNOSIS — Z98.890 OTHER SPECIFIED POSTPROCEDURAL STATES: Chronic | ICD-10-CM

## 2019-06-03 DIAGNOSIS — S60.470A OTHER SUPERFICIAL BITE OF RIGHT INDEX FINGER, INITIAL ENCOUNTER: ICD-10-CM

## 2019-06-03 PROCEDURE — 99284 EMERGENCY DEPT VISIT MOD MDM: CPT | Mod: 25

## 2019-06-03 RX ADMIN — Medication 1 TABLET(S): at 23:53

## 2019-06-03 NOTE — ED PROVIDER NOTE - NSFOLLOWUPINSTRUCTIONS_ED_ALL_ED_FT
please follow up with your doctor in 3-5 days.   take medication as prescribed.    return to ED for any concerns

## 2019-06-03 NOTE — ED ADULT TRIAGE NOTE - CHIEF COMPLAINT QUOTE
patient's dog bit her on left index finger, bite marks on base of finger, knuckle, and distal phalanx. reports pain to finger, pain worse with movement. last tdap last year.

## 2019-06-03 NOTE — ED PROVIDER NOTE - OBJECTIVE STATEMENT
49 y/o female in ED c/o dog bite from her puppy tonight.   pt states was playing with her puppy and was holding and swaying a toy when puppy went to get it, her left 2nd finger was bitten by accident.   pt states in ED for eval 47 y/o female in ED c/o dog bite from her puppy tonight.   pt states was playing with her puppy and was holding and swaying a toy when puppy went to get it, her left 2nd finger was bitten by accident.   pt states in ED for eval.   pt denies any fever, HA< cp, sob, n/v/d/abd pain.

## 2019-06-03 NOTE — ED ADULT NURSE NOTE - OBJECTIVE STATEMENT
pt p/w c/o dog bite to right index finger with 3 2 cm lacerations requiring no sutures, bleeding controlled, pt up to date on Tdap.

## 2019-06-17 ENCOUNTER — INPATIENT (INPATIENT)
Facility: HOSPITAL | Age: 49
LOS: 2 days | Discharge: HOME | End: 2019-06-20
Attending: FAMILY MEDICINE | Admitting: INTERNAL MEDICINE
Payer: COMMERCIAL

## 2019-06-17 VITALS
SYSTOLIC BLOOD PRESSURE: 156 MMHG | RESPIRATION RATE: 18 BRPM | DIASTOLIC BLOOD PRESSURE: 81 MMHG | TEMPERATURE: 99 F | HEART RATE: 100 BPM | OXYGEN SATURATION: 97 %

## 2019-06-17 DIAGNOSIS — N60.09 SOLITARY CYST OF UNSPECIFIED BREAST: Chronic | ICD-10-CM

## 2019-06-17 DIAGNOSIS — G56.02 CARPAL TUNNEL SYNDROME, LEFT UPPER LIMB: Chronic | ICD-10-CM

## 2019-06-17 DIAGNOSIS — Z98.891 HISTORY OF UTERINE SCAR FROM PREVIOUS SURGERY: Chronic | ICD-10-CM

## 2019-06-17 DIAGNOSIS — Z98.890 OTHER SPECIFIED POSTPROCEDURAL STATES: Chronic | ICD-10-CM

## 2019-06-17 DIAGNOSIS — Z98.1 ARTHRODESIS STATUS: Chronic | ICD-10-CM

## 2019-06-17 DIAGNOSIS — Z90.49 ACQUIRED ABSENCE OF OTHER SPECIFIED PARTS OF DIGESTIVE TRACT: Chronic | ICD-10-CM

## 2019-06-17 DIAGNOSIS — G56.01 CARPAL TUNNEL SYNDROME, RIGHT UPPER LIMB: Chronic | ICD-10-CM

## 2019-06-17 DIAGNOSIS — Z90.710 ACQUIRED ABSENCE OF BOTH CERVIX AND UTERUS: Chronic | ICD-10-CM

## 2019-06-17 LAB
ALBUMIN SERPL ELPH-MCNC: 4.6 G/DL — SIGNIFICANT CHANGE UP (ref 3.3–5)
ALP SERPL-CCNC: 92 U/L — SIGNIFICANT CHANGE UP (ref 40–120)
ALT FLD-CCNC: 51 U/L — SIGNIFICANT CHANGE UP (ref 12–78)
ANION GAP SERPL CALC-SCNC: 11 MMOL/L — SIGNIFICANT CHANGE UP (ref 5–17)
APPEARANCE UR: ABNORMAL
AST SERPL-CCNC: 33 U/L — SIGNIFICANT CHANGE UP (ref 15–37)
BACTERIA # UR AUTO: ABNORMAL
BASOPHILS # BLD AUTO: 0.04 K/UL — SIGNIFICANT CHANGE UP (ref 0–0.2)
BASOPHILS NFR BLD AUTO: 0.2 % — SIGNIFICANT CHANGE UP (ref 0–2)
BILIRUB SERPL-MCNC: 0.6 MG/DL — SIGNIFICANT CHANGE UP (ref 0.2–1.2)
BILIRUB UR-MCNC: ABNORMAL
BUN SERPL-MCNC: 16 MG/DL — SIGNIFICANT CHANGE UP (ref 7–23)
CALCIUM SERPL-MCNC: 9.4 MG/DL — SIGNIFICANT CHANGE UP (ref 8.5–10.1)
CHLORIDE SERPL-SCNC: 100 MMOL/L — SIGNIFICANT CHANGE UP (ref 96–108)
CO2 SERPL-SCNC: 28 MMOL/L — SIGNIFICANT CHANGE UP (ref 22–31)
COLOR SPEC: ABNORMAL
CREAT SERPL-MCNC: 0.99 MG/DL — SIGNIFICANT CHANGE UP (ref 0.5–1.3)
DIFF PNL FLD: ABNORMAL
EOSINOPHIL # BLD AUTO: 0.01 K/UL — SIGNIFICANT CHANGE UP (ref 0–0.5)
EOSINOPHIL NFR BLD AUTO: 0 % — SIGNIFICANT CHANGE UP (ref 0–6)
EPI CELLS # UR: SIGNIFICANT CHANGE UP
GLUCOSE SERPL-MCNC: 114 MG/DL — HIGH (ref 70–99)
GLUCOSE UR QL: NEGATIVE MG/DL — SIGNIFICANT CHANGE UP
HCG SERPL-ACNC: 2 MIU/ML — SIGNIFICANT CHANGE UP
HCT VFR BLD CALC: 46.6 % — HIGH (ref 34.5–45)
HGB BLD-MCNC: 16.1 G/DL — HIGH (ref 11.5–15.5)
IMM GRANULOCYTES NFR BLD AUTO: 0.6 % — SIGNIFICANT CHANGE UP (ref 0–1.5)
KETONES UR-MCNC: ABNORMAL
LACTATE SERPL-SCNC: 2.5 MMOL/L — HIGH (ref 0.7–2)
LACTATE SERPL-SCNC: 3 MMOL/L — HIGH (ref 0.7–2)
LEUKOCYTE ESTERASE UR-ACNC: ABNORMAL
LIDOCAIN IGE QN: 115 U/L — SIGNIFICANT CHANGE UP (ref 73–393)
LYMPHOCYTES # BLD AUTO: 14.8 % — SIGNIFICANT CHANGE UP (ref 13–44)
LYMPHOCYTES # BLD AUTO: 3.43 K/UL — HIGH (ref 1–3.3)
MCHC RBC-ENTMCNC: 29.8 PG — SIGNIFICANT CHANGE UP (ref 27–34)
MCHC RBC-ENTMCNC: 34.5 GM/DL — SIGNIFICANT CHANGE UP (ref 32–36)
MCV RBC AUTO: 86.3 FL — SIGNIFICANT CHANGE UP (ref 80–100)
MONOCYTES # BLD AUTO: 1.41 K/UL — HIGH (ref 0–0.9)
MONOCYTES NFR BLD AUTO: 6.1 % — SIGNIFICANT CHANGE UP (ref 2–14)
NEUTROPHILS # BLD AUTO: 18.17 K/UL — HIGH (ref 1.8–7.4)
NEUTROPHILS NFR BLD AUTO: 78.3 % — HIGH (ref 43–77)
NITRITE UR-MCNC: NEGATIVE — SIGNIFICANT CHANGE UP
PH UR: 6 — SIGNIFICANT CHANGE UP (ref 5–8)
PLATELET # BLD AUTO: 434 K/UL — HIGH (ref 150–400)
POTASSIUM SERPL-MCNC: 3 MMOL/L — LOW (ref 3.5–5.3)
POTASSIUM SERPL-SCNC: 3 MMOL/L — LOW (ref 3.5–5.3)
PROT SERPL-MCNC: 8.4 GM/DL — HIGH (ref 6–8.3)
PROT UR-MCNC: 100 MG/DL
RBC # BLD: 5.4 M/UL — HIGH (ref 3.8–5.2)
RBC # FLD: 13.2 % — SIGNIFICANT CHANGE UP (ref 10.3–14.5)
RBC CASTS # UR COMP ASSIST: >50 /HPF (ref 0–4)
SODIUM SERPL-SCNC: 139 MMOL/L — SIGNIFICANT CHANGE UP (ref 135–145)
SP GR SPEC: 1.02 — SIGNIFICANT CHANGE UP (ref 1.01–1.02)
UROBILINOGEN FLD QL: 4 MG/DL
WBC # BLD: 23.2 K/UL — HIGH (ref 3.8–10.5)
WBC # FLD AUTO: 23.2 K/UL — HIGH (ref 3.8–10.5)
WBC UR QL: ABNORMAL

## 2019-06-17 PROCEDURE — 71045 X-RAY EXAM CHEST 1 VIEW: CPT | Mod: 26

## 2019-06-17 PROCEDURE — 74177 CT ABD & PELVIS W/CONTRAST: CPT | Mod: 26

## 2019-06-17 PROCEDURE — 99285 EMERGENCY DEPT VISIT HI MDM: CPT | Mod: 25

## 2019-06-17 PROCEDURE — 93010 ELECTROCARDIOGRAM REPORT: CPT

## 2019-06-17 RX ORDER — HYDROMORPHONE HYDROCHLORIDE 2 MG/ML
0.5 INJECTION INTRAMUSCULAR; INTRAVENOUS; SUBCUTANEOUS ONCE
Refills: 0 | Status: DISCONTINUED | OUTPATIENT
Start: 2019-06-17 | End: 2019-06-17

## 2019-06-17 RX ORDER — ONDANSETRON 8 MG/1
4 TABLET, FILM COATED ORAL ONCE
Refills: 0 | Status: COMPLETED | OUTPATIENT
Start: 2019-06-17 | End: 2019-06-17

## 2019-06-17 RX ORDER — PROCHLORPERAZINE MALEATE 5 MG
10 TABLET ORAL ONCE
Refills: 0 | Status: DISCONTINUED | OUTPATIENT
Start: 2019-06-17 | End: 2019-06-17

## 2019-06-17 RX ORDER — SODIUM CHLORIDE 9 MG/ML
1000 INJECTION INTRAMUSCULAR; INTRAVENOUS; SUBCUTANEOUS ONCE
Refills: 0 | Status: COMPLETED | OUTPATIENT
Start: 2019-06-17 | End: 2019-06-17

## 2019-06-17 RX ORDER — FAMOTIDINE 10 MG/ML
20 INJECTION INTRAVENOUS ONCE
Refills: 0 | Status: COMPLETED | OUTPATIENT
Start: 2019-06-17 | End: 2019-06-17

## 2019-06-17 RX ORDER — PROCHLORPERAZINE MALEATE 5 MG
10 TABLET ORAL ONCE
Refills: 0 | Status: COMPLETED | OUTPATIENT
Start: 2019-06-17 | End: 2019-06-17

## 2019-06-17 RX ORDER — POTASSIUM CHLORIDE 20 MEQ
10 PACKET (EA) ORAL
Refills: 0 | Status: COMPLETED | OUTPATIENT
Start: 2019-06-17 | End: 2019-06-17

## 2019-06-17 RX ADMIN — SODIUM CHLORIDE 1000 MILLILITER(S): 9 INJECTION INTRAMUSCULAR; INTRAVENOUS; SUBCUTANEOUS at 22:50

## 2019-06-17 RX ADMIN — Medication 100 MILLIEQUIVALENT(S): at 23:55

## 2019-06-17 RX ADMIN — FAMOTIDINE 20 MILLIGRAM(S): 10 INJECTION INTRAVENOUS at 18:20

## 2019-06-17 RX ADMIN — SODIUM CHLORIDE 1000 MILLILITER(S): 9 INJECTION INTRAMUSCULAR; INTRAVENOUS; SUBCUTANEOUS at 20:34

## 2019-06-17 RX ADMIN — Medication 10 MILLIEQUIVALENT(S): at 23:50

## 2019-06-17 RX ADMIN — Medication 0.5 MILLIGRAM(S): at 18:25

## 2019-06-17 RX ADMIN — SODIUM CHLORIDE 1000 MILLILITER(S): 9 INJECTION INTRAMUSCULAR; INTRAVENOUS; SUBCUTANEOUS at 19:20

## 2019-06-17 RX ADMIN — SODIUM CHLORIDE 1000 MILLILITER(S): 9 INJECTION INTRAMUSCULAR; INTRAVENOUS; SUBCUTANEOUS at 23:50

## 2019-06-17 RX ADMIN — ONDANSETRON 4 MILLIGRAM(S): 8 TABLET, FILM COATED ORAL at 18:19

## 2019-06-17 RX ADMIN — SODIUM CHLORIDE 1000 MILLILITER(S): 9 INJECTION INTRAMUSCULAR; INTRAVENOUS; SUBCUTANEOUS at 19:34

## 2019-06-17 RX ADMIN — Medication 10 MILLIGRAM(S): at 20:53

## 2019-06-17 RX ADMIN — SODIUM CHLORIDE 1000 MILLILITER(S): 9 INJECTION INTRAMUSCULAR; INTRAVENOUS; SUBCUTANEOUS at 18:20

## 2019-06-17 RX ADMIN — Medication 100 MILLIEQUIVALENT(S): at 22:50

## 2019-06-17 NOTE — ED STATDOCS - CARE PLAN
Principal Discharge DX:	Sepsis, due to unspecified organism  Secondary Diagnosis:	Urinary tract infection with hematuria, site unspecified

## 2019-06-17 NOTE — ED STATDOCS - PROGRESS NOTE DETAILS
BETH Costello:   Patient has been seen, evaluated and orders have been written by the attending in intake. Patient is stable.  I will follow up the results of orders written and I will continue to evaluate/observe the patient.  XANDER Costello PA-C Re-eval x 2 in the ED.  Pt initally woken up and c/o continued nausea.  Reports vomited 1 more time in ED.  Abd: Active BS x4, Soft, (+) Mild epigastric and RUQ tenderness.  Added IV Compazine.  WBC elevated to 23,000.  ? Reactive to vomiting.  K+ 3, Lactate 3.  Added additional IVF.  Wll repeat Lactate s/p 3rd Liter.  Added K riders x 3.   Ordered EKG and CT to eval abd pain further.  Pt requesting pain meds, so IV Dilaudid ordered. (Had fuzzy mental feeling s/p Morphine in the PAst)  Neg swelling, itching, hives.  Will endorse to BETH Evans at this time.  Discussed with Dr. Laws.    Lidia Costello PA-C spoke to Dr. Caballero will admit for sepsis and uti, pt received bolus fluids prior to signout will add antibotics and cultures, d/w Dr. Laws and agrees with plan. -Thalia Evans PA-C

## 2019-06-17 NOTE — ED ADULT NURSE REASSESSMENT NOTE - NS ED NURSE REASSESS COMMENT FT1
Pt sleeping in bed. Potassium supplementation taking place. No complaints from pt at this time. Comfort and safety maintained.

## 2019-06-17 NOTE — ED STATDOCS - ENMT, MLM
Nasal mucosa clear.  MM moderately dry. Throat has no vesicles, no oropharyngeal exudates and uvula is midline.

## 2019-06-17 NOTE — ED STATDOCS - CLINICAL SUMMARY MEDICAL DECISION MAKING FREE TEXT BOX
49 y/o female h/o chronic abd pain of unknown etiology s/p cholecystostomy, cyclic vomiting syndrome ambulatory to ED with two days upper abd pain, nausea, vomiting, unable to tolerate PO. Pt clinically dry, diffuse upper abd TTP. No guarding, rebound mass. Plan for labs, fluids, Iv Zofran Ativan, observe reassess and discuss with Dr. Childs.

## 2019-06-17 NOTE — ED ADULT TRIAGE NOTE - CHIEF COMPLAINT QUOTE
Patient presents complaining of "having a cyclic vomiting episode, non stop vomiting for 3 days". Patient states normal medications are not working.

## 2019-06-17 NOTE — ED STATDOCS - ATTENDING CONTRIBUTION TO CARE
I, Rishabh Laws MD, personally saw the patient with the PA, and completed the key components of the history and physical exam. I then discussed the management plan with the PA.

## 2019-06-17 NOTE — ED STATDOCS - GASTROINTESTINAL, MLM
abdomen soft, and non-distended. Bowel sounds present. +Diffuse upper abd TTP. No guarding rebound or mass.

## 2019-06-17 NOTE — ED STATDOCS - OBJECTIVE STATEMENT
49 y/o female with a PMHx of Anxiety, Arthritis, Breast Mass, cyclical vomiting, fibromyalgia, h/o cholecystectomy, hysterectomy, lumbar fusion, , umbilical hernia repair presents to the ED c/o abd pain, nausea, vomiting, diarrhea x3 days. Moderate severity of pain, intractable vomiting. States she has been unable to find a trigger for her cyclic vomiting. Reports she has these episodes every few months and is usually admitted for a few days. Usually treated with ativan, compazine, Zofran. Denies fever. PMD: Dr. Gamble. .GI: Dr. Childs.

## 2019-06-18 DIAGNOSIS — N20.0 CALCULUS OF KIDNEY: ICD-10-CM

## 2019-06-18 DIAGNOSIS — N28.1 CYST OF KIDNEY, ACQUIRED: ICD-10-CM

## 2019-06-18 DIAGNOSIS — N39.0 URINARY TRACT INFECTION, SITE NOT SPECIFIED: ICD-10-CM

## 2019-06-18 DIAGNOSIS — N13.2 HYDRONEPHROSIS WITH RENAL AND URETERAL CALCULOUS OBSTRUCTION: ICD-10-CM

## 2019-06-18 DIAGNOSIS — A41.9 SEPSIS, UNSPECIFIED ORGANISM: ICD-10-CM

## 2019-06-18 LAB
ALBUMIN SERPL ELPH-MCNC: 3.2 G/DL — LOW (ref 3.3–5)
ALP SERPL-CCNC: 66 U/L — SIGNIFICANT CHANGE UP (ref 40–120)
ALT FLD-CCNC: 42 U/L — SIGNIFICANT CHANGE UP (ref 12–78)
ANION GAP SERPL CALC-SCNC: 8 MMOL/L — SIGNIFICANT CHANGE UP (ref 5–17)
AST SERPL-CCNC: 22 U/L — SIGNIFICANT CHANGE UP (ref 15–37)
BASOPHILS # BLD AUTO: 0.03 K/UL — SIGNIFICANT CHANGE UP (ref 0–0.2)
BASOPHILS NFR BLD AUTO: 0.2 % — SIGNIFICANT CHANGE UP (ref 0–2)
BILIRUB SERPL-MCNC: 0.5 MG/DL — SIGNIFICANT CHANGE UP (ref 0.2–1.2)
BUN SERPL-MCNC: 11 MG/DL — SIGNIFICANT CHANGE UP (ref 7–23)
CALCIUM SERPL-MCNC: 8.1 MG/DL — LOW (ref 8.5–10.1)
CHLORIDE SERPL-SCNC: 107 MMOL/L — SIGNIFICANT CHANGE UP (ref 96–108)
CO2 SERPL-SCNC: 28 MMOL/L — SIGNIFICANT CHANGE UP (ref 22–31)
CREAT SERPL-MCNC: 0.61 MG/DL — SIGNIFICANT CHANGE UP (ref 0.5–1.3)
EOSINOPHIL # BLD AUTO: 0.05 K/UL — SIGNIFICANT CHANGE UP (ref 0–0.5)
EOSINOPHIL NFR BLD AUTO: 0.3 % — SIGNIFICANT CHANGE UP (ref 0–6)
GLUCOSE SERPL-MCNC: 99 MG/DL — SIGNIFICANT CHANGE UP (ref 70–99)
HCT VFR BLD CALC: 37.3 % — SIGNIFICANT CHANGE UP (ref 34.5–45)
HGB BLD-MCNC: 12.5 G/DL — SIGNIFICANT CHANGE UP (ref 11.5–15.5)
IMM GRANULOCYTES NFR BLD AUTO: 0.5 % — SIGNIFICANT CHANGE UP (ref 0–1.5)
LACTATE SERPL-SCNC: 1.1 MMOL/L — SIGNIFICANT CHANGE UP (ref 0.7–2)
LYMPHOCYTES # BLD AUTO: 25.4 % — SIGNIFICANT CHANGE UP (ref 13–44)
LYMPHOCYTES # BLD AUTO: 3.96 K/UL — HIGH (ref 1–3.3)
MAGNESIUM SERPL-MCNC: 2 MG/DL — SIGNIFICANT CHANGE UP (ref 1.6–2.6)
MCHC RBC-ENTMCNC: 29.6 PG — SIGNIFICANT CHANGE UP (ref 27–34)
MCHC RBC-ENTMCNC: 33.5 GM/DL — SIGNIFICANT CHANGE UP (ref 32–36)
MCV RBC AUTO: 88.4 FL — SIGNIFICANT CHANGE UP (ref 80–100)
MONOCYTES # BLD AUTO: 0.98 K/UL — HIGH (ref 0–0.9)
MONOCYTES NFR BLD AUTO: 6.3 % — SIGNIFICANT CHANGE UP (ref 2–14)
NEUTROPHILS # BLD AUTO: 10.51 K/UL — HIGH (ref 1.8–7.4)
NEUTROPHILS NFR BLD AUTO: 67.3 % — SIGNIFICANT CHANGE UP (ref 43–77)
PHOSPHATE SERPL-MCNC: 3.2 MG/DL — SIGNIFICANT CHANGE UP (ref 2.5–4.5)
PLATELET # BLD AUTO: 297 K/UL — SIGNIFICANT CHANGE UP (ref 150–400)
POTASSIUM SERPL-MCNC: 3.1 MMOL/L — LOW (ref 3.5–5.3)
POTASSIUM SERPL-SCNC: 3.1 MMOL/L — LOW (ref 3.5–5.3)
PROT SERPL-MCNC: 6 GM/DL — SIGNIFICANT CHANGE UP (ref 6–8.3)
RBC # BLD: 4.22 M/UL — SIGNIFICANT CHANGE UP (ref 3.8–5.2)
RBC # FLD: 13.2 % — SIGNIFICANT CHANGE UP (ref 10.3–14.5)
SODIUM SERPL-SCNC: 143 MMOL/L — SIGNIFICANT CHANGE UP (ref 135–145)
WBC # BLD: 15.61 K/UL — HIGH (ref 3.8–10.5)
WBC # FLD AUTO: 15.61 K/UL — HIGH (ref 3.8–10.5)

## 2019-06-18 PROCEDURE — 99254 IP/OBS CNSLTJ NEW/EST MOD 60: CPT | Mod: 25

## 2019-06-18 PROCEDURE — 71045 X-RAY EXAM CHEST 1 VIEW: CPT | Mod: 26

## 2019-06-18 PROCEDURE — 76770 US EXAM ABDO BACK WALL COMP: CPT | Mod: 26

## 2019-06-18 PROCEDURE — 93010 ELECTROCARDIOGRAM REPORT: CPT

## 2019-06-18 PROCEDURE — 52332 CYSTOSCOPY AND TREATMENT: CPT | Mod: LT

## 2019-06-18 RX ORDER — DOCUSATE SODIUM 100 MG
100 CAPSULE ORAL THREE TIMES A DAY
Refills: 0 | Status: DISCONTINUED | OUTPATIENT
Start: 2019-06-18 | End: 2019-06-20

## 2019-06-18 RX ORDER — CEFEPIME 1 G/1
1000 INJECTION, POWDER, FOR SOLUTION INTRAMUSCULAR; INTRAVENOUS EVERY 12 HOURS
Refills: 0 | Status: DISCONTINUED | OUTPATIENT
Start: 2019-06-18 | End: 2019-06-20

## 2019-06-18 RX ORDER — DOCUSATE SODIUM 100 MG
100 CAPSULE ORAL THREE TIMES A DAY
Refills: 0 | Status: DISCONTINUED | OUTPATIENT
Start: 2019-06-18 | End: 2019-06-18

## 2019-06-18 RX ORDER — CEFTRIAXONE 500 MG/1
1000 INJECTION, POWDER, FOR SOLUTION INTRAMUSCULAR; INTRAVENOUS ONCE
Refills: 0 | Status: COMPLETED | OUTPATIENT
Start: 2019-06-18 | End: 2019-06-18

## 2019-06-18 RX ORDER — SODIUM CHLORIDE 9 MG/ML
1000 INJECTION, SOLUTION INTRAVENOUS
Refills: 0 | Status: DISCONTINUED | OUTPATIENT
Start: 2019-06-18 | End: 2019-06-18

## 2019-06-18 RX ORDER — SODIUM CHLORIDE 9 MG/ML
1000 INJECTION INTRAMUSCULAR; INTRAVENOUS; SUBCUTANEOUS
Refills: 0 | Status: DISCONTINUED | OUTPATIENT
Start: 2019-06-18 | End: 2019-06-20

## 2019-06-18 RX ORDER — OXYCODONE HYDROCHLORIDE 5 MG/1
0 TABLET ORAL
Qty: 0 | Refills: 0 | DISCHARGE

## 2019-06-18 RX ORDER — POTASSIUM CHLORIDE 20 MEQ
40 PACKET (EA) ORAL ONCE
Refills: 0 | Status: COMPLETED | OUTPATIENT
Start: 2019-06-18 | End: 2019-06-18

## 2019-06-18 RX ORDER — HYDROMORPHONE HYDROCHLORIDE 2 MG/ML
0.5 INJECTION INTRAMUSCULAR; INTRAVENOUS; SUBCUTANEOUS EVERY 4 HOURS
Refills: 0 | Status: DISCONTINUED | OUTPATIENT
Start: 2019-06-18 | End: 2019-06-20

## 2019-06-18 RX ORDER — ONDANSETRON 8 MG/1
4 TABLET, FILM COATED ORAL ONCE
Refills: 0 | Status: COMPLETED | OUTPATIENT
Start: 2019-06-18 | End: 2019-06-18

## 2019-06-18 RX ORDER — GABAPENTIN 400 MG/1
300 CAPSULE ORAL
Refills: 0 | Status: DISCONTINUED | OUTPATIENT
Start: 2019-06-18 | End: 2019-06-18

## 2019-06-18 RX ORDER — SODIUM CHLORIDE 9 MG/ML
500 INJECTION INTRAMUSCULAR; INTRAVENOUS; SUBCUTANEOUS ONCE
Refills: 0 | Status: COMPLETED | OUTPATIENT
Start: 2019-06-18 | End: 2019-06-18

## 2019-06-18 RX ORDER — CEFTRIAXONE 500 MG/1
1 INJECTION, POWDER, FOR SOLUTION INTRAMUSCULAR; INTRAVENOUS ONCE
Refills: 0 | Status: DISCONTINUED | OUTPATIENT
Start: 2019-06-18 | End: 2019-06-18

## 2019-06-18 RX ORDER — ONDANSETRON 8 MG/1
4 TABLET, FILM COATED ORAL EVERY 6 HOURS
Refills: 0 | Status: DISCONTINUED | OUTPATIENT
Start: 2019-06-18 | End: 2019-06-18

## 2019-06-18 RX ORDER — ACETAMINOPHEN 500 MG
650 TABLET ORAL EVERY 6 HOURS
Refills: 0 | Status: DISCONTINUED | OUTPATIENT
Start: 2019-06-18 | End: 2019-06-20

## 2019-06-18 RX ORDER — ALPRAZOLAM 0.25 MG
1 TABLET ORAL
Qty: 0 | Refills: 0 | DISCHARGE

## 2019-06-18 RX ORDER — GABAPENTIN 400 MG/1
300 CAPSULE ORAL
Refills: 0 | Status: DISCONTINUED | OUTPATIENT
Start: 2019-06-18 | End: 2019-06-20

## 2019-06-18 RX ORDER — HYDROMORPHONE HYDROCHLORIDE 2 MG/ML
0.5 INJECTION INTRAMUSCULAR; INTRAVENOUS; SUBCUTANEOUS EVERY 4 HOURS
Refills: 0 | Status: DISCONTINUED | OUTPATIENT
Start: 2019-06-18 | End: 2019-06-18

## 2019-06-18 RX ORDER — CEFEPIME 1 G/1
1000 INJECTION, POWDER, FOR SOLUTION INTRAMUSCULAR; INTRAVENOUS EVERY 12 HOURS
Refills: 0 | Status: DISCONTINUED | OUTPATIENT
Start: 2019-06-18 | End: 2019-06-18

## 2019-06-18 RX ORDER — FENTANYL CITRATE 50 UG/ML
50 INJECTION INTRAVENOUS
Refills: 0 | Status: DISCONTINUED | OUTPATIENT
Start: 2019-06-18 | End: 2019-06-18

## 2019-06-18 RX ORDER — ONDANSETRON 8 MG/1
4 TABLET, FILM COATED ORAL EVERY 6 HOURS
Refills: 0 | Status: DISCONTINUED | OUTPATIENT
Start: 2019-06-18 | End: 2019-06-20

## 2019-06-18 RX ORDER — SENNA PLUS 8.6 MG/1
2 TABLET ORAL AT BEDTIME
Refills: 0 | Status: DISCONTINUED | OUTPATIENT
Start: 2019-06-18 | End: 2019-06-20

## 2019-06-18 RX ORDER — PHENAZOPYRIDINE HCL 100 MG
100 TABLET ORAL EVERY 8 HOURS
Refills: 0 | Status: DISCONTINUED | OUTPATIENT
Start: 2019-06-18 | End: 2019-06-20

## 2019-06-18 RX ORDER — ACETAMINOPHEN 500 MG
650 TABLET ORAL EVERY 6 HOURS
Refills: 0 | Status: DISCONTINUED | OUTPATIENT
Start: 2019-06-18 | End: 2019-06-18

## 2019-06-18 RX ORDER — PROCHLORPERAZINE MALEATE 5 MG
4 TABLET ORAL
Qty: 0 | Refills: 0 | DISCHARGE

## 2019-06-18 RX ORDER — SENNA PLUS 8.6 MG/1
2 TABLET ORAL AT BEDTIME
Refills: 0 | Status: DISCONTINUED | OUTPATIENT
Start: 2019-06-18 | End: 2019-06-18

## 2019-06-18 RX ADMIN — FENTANYL CITRATE 50 MICROGRAM(S): 50 INJECTION INTRAVENOUS at 18:35

## 2019-06-18 RX ADMIN — Medication 650 MILLIGRAM(S): at 02:46

## 2019-06-18 RX ADMIN — HYDROMORPHONE HYDROCHLORIDE 0.5 MILLIGRAM(S): 2 INJECTION INTRAMUSCULAR; INTRAVENOUS; SUBCUTANEOUS at 13:25

## 2019-06-18 RX ADMIN — HYDROMORPHONE HYDROCHLORIDE 0.5 MILLIGRAM(S): 2 INJECTION INTRAMUSCULAR; INTRAVENOUS; SUBCUTANEOUS at 13:40

## 2019-06-18 RX ADMIN — HYDROMORPHONE HYDROCHLORIDE 0.5 MILLIGRAM(S): 2 INJECTION INTRAMUSCULAR; INTRAVENOUS; SUBCUTANEOUS at 09:17

## 2019-06-18 RX ADMIN — SODIUM CHLORIDE 125 MILLILITER(S): 9 INJECTION INTRAMUSCULAR; INTRAVENOUS; SUBCUTANEOUS at 03:23

## 2019-06-18 RX ADMIN — FENTANYL CITRATE 50 MICROGRAM(S): 50 INJECTION INTRAVENOUS at 18:40

## 2019-06-18 RX ADMIN — FENTANYL CITRATE 50 MICROGRAM(S): 50 INJECTION INTRAVENOUS at 19:00

## 2019-06-18 RX ADMIN — FENTANYL CITRATE 50 MICROGRAM(S): 50 INJECTION INTRAVENOUS at 19:20

## 2019-06-18 RX ADMIN — ONDANSETRON 4 MILLIGRAM(S): 8 TABLET, FILM COATED ORAL at 13:07

## 2019-06-18 RX ADMIN — CEFEPIME 1000 MILLIGRAM(S): 1 INJECTION, POWDER, FOR SOLUTION INTRAMUSCULAR; INTRAVENOUS at 20:24

## 2019-06-18 RX ADMIN — Medication 10 MILLIEQUIVALENT(S): at 00:55

## 2019-06-18 RX ADMIN — Medication 100 MILLIEQUIVALENT(S): at 01:22

## 2019-06-18 RX ADMIN — HYDROMORPHONE HYDROCHLORIDE 0.5 MILLIGRAM(S): 2 INJECTION INTRAMUSCULAR; INTRAVENOUS; SUBCUTANEOUS at 05:07

## 2019-06-18 RX ADMIN — CEFEPIME 1000 MILLIGRAM(S): 1 INJECTION, POWDER, FOR SOLUTION INTRAMUSCULAR; INTRAVENOUS at 05:08

## 2019-06-18 RX ADMIN — ONDANSETRON 4 MILLIGRAM(S): 8 TABLET, FILM COATED ORAL at 06:58

## 2019-06-18 RX ADMIN — GABAPENTIN 300 MILLIGRAM(S): 400 CAPSULE ORAL at 05:07

## 2019-06-18 RX ADMIN — GABAPENTIN 300 MILLIGRAM(S): 400 CAPSULE ORAL at 22:37

## 2019-06-18 RX ADMIN — Medication 100 MILLIGRAM(S): at 19:17

## 2019-06-18 RX ADMIN — HYDROMORPHONE HYDROCHLORIDE 0.5 MILLIGRAM(S): 2 INJECTION INTRAMUSCULAR; INTRAVENOUS; SUBCUTANEOUS at 01:19

## 2019-06-18 RX ADMIN — FENTANYL CITRATE 50 MICROGRAM(S): 50 INJECTION INTRAVENOUS at 19:10

## 2019-06-18 RX ADMIN — Medication 40 MILLIEQUIVALENT(S): at 06:31

## 2019-06-18 RX ADMIN — HYDROMORPHONE HYDROCHLORIDE 0.5 MILLIGRAM(S): 2 INJECTION INTRAMUSCULAR; INTRAVENOUS; SUBCUTANEOUS at 09:30

## 2019-06-18 RX ADMIN — SODIUM CHLORIDE 500 MILLILITER(S): 9 INJECTION INTRAMUSCULAR; INTRAVENOUS; SUBCUTANEOUS at 03:23

## 2019-06-18 RX ADMIN — HYDROMORPHONE HYDROCHLORIDE 0.5 MILLIGRAM(S): 2 INJECTION INTRAMUSCULAR; INTRAVENOUS; SUBCUTANEOUS at 20:45

## 2019-06-18 RX ADMIN — HYDROMORPHONE HYDROCHLORIDE 0.5 MILLIGRAM(S): 2 INJECTION INTRAMUSCULAR; INTRAVENOUS; SUBCUTANEOUS at 20:24

## 2019-06-18 RX ADMIN — CEFTRIAXONE 1000 MILLIGRAM(S): 500 INJECTION, POWDER, FOR SOLUTION INTRAMUSCULAR; INTRAVENOUS at 01:20

## 2019-06-18 RX ADMIN — FENTANYL CITRATE 50 MICROGRAM(S): 50 INJECTION INTRAVENOUS at 18:50

## 2019-06-18 NOTE — CONSULT NOTE ADULT - PROBLEM SELECTOR RECOMMENDATION 2
Discussed treatment options and recommended Cystoscopy, retrograde pyelogram and left ureteral stent placement.   Pt agreeable. Booked for OR.   Risks and benefits were discussed. Informed consent obtained.

## 2019-06-18 NOTE — H&P ADULT - HISTORY OF PRESENT ILLNESS
49 y/o F with PMH of herniated discs, anxiety, arthritis, fibromyalgia, p/w vomiting since saturday. Patient states vomiting has been non-bloody, non-stop, and with any oral intake. Patient also has some lower abdominal pain associated with it and also some soreness from vomiting. States she has chronic flank pain, and did not have any new flank pain. Denies fever, chills, dysuria, increased frequency, hematuria, CP, SOB, cough, runny nose, sore throat    PSH: Excision of breast lipoma, carpal tunnel surgery, umbilical hernia repair, hysterectomy, choleystectomy, spinal surgery,      Social Hx: Tobacco - 1/2 ppd, etoh  / drugs - denies     Family hx: grandmother - lupus

## 2019-06-18 NOTE — PROGRESS NOTE ADULT - SUBJECTIVE AND OBJECTIVE BOX
patient seen and examined.  patient was admitted by my colleague earlier today.  urology consult appreciated. pt scheduled for procedure.  cont antibx.  pain is controlled w/ meds.   Sepsis - POA  wbc improving. lactate normalized. cont IV fluids and antibx.   hospitalist will follow.

## 2019-06-18 NOTE — BRIEF OPERATIVE NOTE - NSICDXBRIEFPROCEDURE_GEN_ALL_CORE_FT
PROCEDURES:  Cystoscopy, with retrograde pyelography and ureteral stent insertion 18-Jun-2019 18:29:02 Left Felipe Obregon S

## 2019-06-18 NOTE — CONSULT NOTE ADULT - SUBJECTIVE AND OBJECTIVE BOX
CHIEF COMPLAINT:  Left kidney stone     HISTORY OF PRESENT ILLNESS:  49 yo female admitted after presenting with persistent vomiting.   In ED on CT scan: 7 mm left renal pelvic stone with mild hydronephrosis, elevated WBC and UA concerning for UTI.   Started on Cefepime. Was told 2 years ago that she has non obstructing kidney stone.   Also c/o lower abdominal pain, has chronic back pain.   Denies dysuria, hematuria, lower abdominal or flank pain, fever, chills or rigors.     PSH: Excision of breast lipoma, carpal tunnel surgery, umbilical hernia repair, hysterectomy, choleystectomy, spinal surgery,      Social Hx: Tobacco - 1/2 ppd, etoh  / drugs - denies     Family hx: grandmother - lupus     PAST MEDICAL & SURGICAL HISTORY:  Cyclical vomiting  Breast mass  Arthritis  Anxiety  Fibromyalgia  Breast cyst: right  (x2)     (last one)  History of cholecystectomy:   Carpal tunnel syndrome of right wrist:   Carpal tunnel syndrome of left wrist:   History of lumbar fusion:   S/P : ,   H/O: hysterectomy:   H/O umbilical hernia repair:       REVIEW OF SYSTEMS:  All other review of systems is negative unless indicated above.    MEDICATIONS  (STANDING):  cefepime  Injectable. 1000 milliGRAM(s) IV Push every 12 hours  gabapentin 300 milliGRAM(s) Oral two times a day  sodium chloride 0.9%. 1000 milliLiter(s) (125 mL/Hr) IV Continuous <Continuous>    MEDICATIONS  (PRN):  acetaminophen   Tablet .. 650 milliGRAM(s) Oral every 6 hours PRN Mild Pain (1 - 3)  docusate sodium 100 milliGRAM(s) Oral three times a day PRN Constipation  HYDROmorphone  Injectable 0.5 milliGRAM(s) IV Push every 4 hours PRN Moderate Pain (4 - 6)  ondansetron Injectable 4 milliGRAM(s) IV Push every 6 hours PRN Nausea and/or Vomiting  senna 2 Tablet(s) Oral at bedtime PRN Constipation      Allergies    morphine (Other)    Intolerances        SOCIAL HISTORY:    FAMILY HISTORY:      Vital Signs Last 24 Hrs  T(C): 36.7 (2019 04:58), Max: 37.2 (2019 21:30)  T(F): 98 (2019 04:58), Max: 98.9 (2019 21:30)  HR: 63 (2019 04:58) (63 - 100)  BP: 134/75 (2019 04:58) (130/82 - 156/81)  BP(mean): --  RR: 18 (2019 04:58) (17 - 18)  SpO2: 96% (2019 04:58) (96% - 100%)    PHYSICAL EXAM:    Constitutional: No acute distress  HEENT: EOMI, Normal Hearing  Neck: Supple  Back: No costovertebral angle tenderness  Respiratory: Normal respiratory effort    Cardiovascular: Normal peripheral circulation   Abd: Soft, non distended, non tender  Extremities: No peripheral edema  Neurological: No focal deficits  Psychiatric: Normal mood, normal affect  Musculoskeletal: Moving all 4 extremities  Skin: No rashes    LABS:                        12.5   15.61 )-----------( 297      ( 2019 04:07 )             37.3     06-18    143  |  107  |  11  ----------------------------<  99  3.1<L>   |  28  |  0.61    Ca    8.1<L>      2019 04:07  Phos  3.2     06-18  Mg     2.0     -18    TPro  6.0  /  Alb  3.2<L>  /  TBili  0.5  /  DBili  x   /  AST  22  /  ALT  42  /  AlkPhos  66  06-18      Urinalysis Basic - ( 2019 18:11 )    Color: Mikayla / Appearance: Slightly Turbid / S.020 / pH: x  Gluc: x / Ketone: Trace  / Bili: Small / Urobili: 4 mg/dL   Blood: x / Protein: 100 mg/dL / Nitrite: Negative   Leuk Esterase: Trace / RBC: >50 /HPF / WBC 6-10   Sq Epi: x / Non Sq Epi: Few / Bacteria: Occasional      < from: CT Abdomen and Pelvis w/ IV Cont (19 @ 22:26) >   CT ABDOMEN AND PELVIS IC                            PROCEDURE DATE:  2019          INTERPRETATION:  CT ABDOMEN AND PELVIS WITH CONTRAST    INDICATION: Vomiting and epigastric pain.    TECHNIQUE: Contrast enhanced CT of the abdomen and pelvis. Images are   reformatted in the sagittal and coronal planes.    90 mL of Omnipaque 350 contrast material was injected IV.    COMPARISON: CT torso 2019 and CT abdomen pelvis 2019.    FINDINGS:    Lower Thorax: No consolidation or effusion. Mild dependent atelectasis is   seen at the lung bases. At least two less than 5 mm subpleural based   nodules at the left lung base on image 16 of series 2 for which   nonemergent pulmonary imaging follow-up is advised.    Liver: No suspicious lesions.  Biliary: No significant biliary dilatation. Cholecystectomy.  Spleen: No suspicious lesions.  Pancreas: No inflammatory changes or ductal dilatation.  Adrenals: Normal.  Kidneys: Previously identified left lower pole renal 7mm stone progressed   to the left renal pelvis,  resulting in minimal left hydronephrosis.   Stable bilateral renal Indeterminate lesions measuring 1.2 on the right   side and 1.1 cm on the left side, unchanged since prior study.    Vessels: Normal caliber. Atherosclerotic disease of the aorta and its   branches.    GI tract: No evidence of small bowel obstruction. No wall thickening or   inflammatory changes. Diverticulosis without evidence of acute   diverticulitis. Normal appendix.    Peritoneum/retroperitoneumand mesentery: No free air. No organized fluid   collection. No adenopathy.    Pelvic organs/Bladder: No pelvic masses. Bladder is normal.    Abdominal wall: Unremarkable.  Bones and soft tissues: Multilevel degenerative changes of the spine are   noted. Status post L4-S1 posterior spinal fusion. L5 laminectomy. Stable   mild anterolisthesis of L5 and S1.      IMPRESSION:    Previously identified left lower pole renal 7mm stone progressed to the   left renal pelvis,  resulting in minimal left hydronephrosis. Stable   bilateral renal Indeterminate lesions measuring 1.2 on the right side and   1.1 cm on the left side, unchanged since prior study. Consider   nonemergent correlation with renal ultrasound for better characterization.    At least two less than 5 mm subpleural based nodules at the left lung   base for which nonemergent pulmonary imaging follow-up is advised.    < end of copied text >    < from: US Kidney and Bladder (..19 @ 10:35) >  EXAM:  US KIDNEYS AND BLADDER                            PROCEDURE DATE:  2019          INTERPRETATION:  CLINICAL INFORMATION: Renal lesion seen on CT    COMPARISON: CT abdomen pelvis 2019, 2/3/2016    TECHNIQUE: Sonography of the kidneys and bladder.     FINDINGS:    Right kidney:  13 cm. No hydronephrosis or calculus. Exophytic   hypovascular lower pole lesion the appearance of a cyst measuring 1 cm   which is unchanged in size dating back to 2/3/2016.    Left kidney:  10 cm. No hydronephrosis. Nonobstructive calculus in the   lower pole measuring 0.6 cm. Hypodensity in the lower pole seen on CT not   well delineated on current ultrasound.    Urinary bladder: Within normal limits.    IMPRESSION:     Exophytic right lower pole cyst.    Hypodensity in the left lower pole on recent CT not well delineated on   current ultrasound.    < end of copied text >

## 2019-06-18 NOTE — H&P ADULT - ASSESSMENT
47 y/o F with PMH of herniated discs, anxiety, arthritis, fibromyalgia, p/w vomiting since saturday    *Severe Sepsis 2/2 UTI / obstructive nephrolithiasis causing hydronephrosis - possible pyelonephritis   -IVF  -Cefepime  -F/u blood / urine cx  - consult  -NPO  -Renal lesions on CT - will await  input  -Pain control   -Lactic acidosis -> repeat lactate after IVF    *Pulmonary nodules   -Explained findings to patient, and need for outpatient f/u with pulmonologist, especially given that patient is an active smoker    *H/o herniated discs / anxiety / arthritis / fibromyalgia  -C/w home meds and outpatient management     *DVT ppx  -SCDs     *Sepsis reassessment done 47 y/o F with PMH of herniated discs, anxiety, arthritis, fibromyalgia, p/w vomiting since saturday    *Severe Sepsis 2/2 UTI / obstructive nephrolithiasis causing hydronephrosis - possible pyelonephritis   -IVF  -Cefepime  -F/u blood / urine cx  - consult  -NPO  -Renal lesions on CT - will await  input + renal U/S   -Pain control   -Lactic acidosis -> repeat lactate after IVF    *Pulmonary nodules   -Explained findings to patient, and need for outpatient f/u with pulmonologist, especially given that patient is an active smoker    *H/o herniated discs / anxiety / arthritis / fibromyalgia  -C/w home meds and outpatient management     *DVT ppx  -SCDs     *Sepsis reassessment done

## 2019-06-18 NOTE — CONSULT NOTE ADULT - PROBLEM SELECTOR RECOMMENDATION 5
Discussed that Renal cysts are a common finding on routine radiological studies. Autopsy studies in patients over the age of 50 reveal greater than a 50 % chance of having at least one simple renal cyst and that renal cysts may be classified as simple or complex depending how they look on imaging. Discussed complexity of renal cysts and its implications as regards malignancy.

## 2019-06-19 LAB
NIGHT BLUE STAIN TISS: SIGNIFICANT CHANGE UP
SPECIMEN SOURCE: SIGNIFICANT CHANGE UP

## 2019-06-19 PROCEDURE — 99232 SBSQ HOSP IP/OBS MODERATE 35: CPT

## 2019-06-19 PROCEDURE — 74018 RADEX ABDOMEN 1 VIEW: CPT | Mod: 26

## 2019-06-19 RX ADMIN — Medication 100 MILLIGRAM(S): at 13:04

## 2019-06-19 RX ADMIN — Medication 100 MILLIGRAM(S): at 21:48

## 2019-06-19 RX ADMIN — HYDROMORPHONE HYDROCHLORIDE 0.5 MILLIGRAM(S): 2 INJECTION INTRAMUSCULAR; INTRAVENOUS; SUBCUTANEOUS at 22:35

## 2019-06-19 RX ADMIN — GABAPENTIN 300 MILLIGRAM(S): 400 CAPSULE ORAL at 05:05

## 2019-06-19 RX ADMIN — HYDROMORPHONE HYDROCHLORIDE 0.5 MILLIGRAM(S): 2 INJECTION INTRAMUSCULAR; INTRAVENOUS; SUBCUTANEOUS at 14:32

## 2019-06-19 RX ADMIN — Medication 100 MILLIGRAM(S): at 05:06

## 2019-06-19 RX ADMIN — CEFEPIME 1000 MILLIGRAM(S): 1 INJECTION, POWDER, FOR SOLUTION INTRAMUSCULAR; INTRAVENOUS at 05:05

## 2019-06-19 RX ADMIN — HYDROMORPHONE HYDROCHLORIDE 0.5 MILLIGRAM(S): 2 INJECTION INTRAMUSCULAR; INTRAVENOUS; SUBCUTANEOUS at 00:47

## 2019-06-19 RX ADMIN — Medication 100 MILLIGRAM(S): at 00:52

## 2019-06-19 RX ADMIN — CEFEPIME 1000 MILLIGRAM(S): 1 INJECTION, POWDER, FOR SOLUTION INTRAMUSCULAR; INTRAVENOUS at 18:45

## 2019-06-19 RX ADMIN — HYDROMORPHONE HYDROCHLORIDE 0.5 MILLIGRAM(S): 2 INJECTION INTRAMUSCULAR; INTRAVENOUS; SUBCUTANEOUS at 01:05

## 2019-06-19 RX ADMIN — HYDROMORPHONE HYDROCHLORIDE 0.5 MILLIGRAM(S): 2 INJECTION INTRAMUSCULAR; INTRAVENOUS; SUBCUTANEOUS at 05:20

## 2019-06-19 RX ADMIN — HYDROMORPHONE HYDROCHLORIDE 0.5 MILLIGRAM(S): 2 INJECTION INTRAMUSCULAR; INTRAVENOUS; SUBCUTANEOUS at 18:44

## 2019-06-19 RX ADMIN — HYDROMORPHONE HYDROCHLORIDE 0.5 MILLIGRAM(S): 2 INJECTION INTRAMUSCULAR; INTRAVENOUS; SUBCUTANEOUS at 05:06

## 2019-06-19 RX ADMIN — HYDROMORPHONE HYDROCHLORIDE 0.5 MILLIGRAM(S): 2 INJECTION INTRAMUSCULAR; INTRAVENOUS; SUBCUTANEOUS at 10:16

## 2019-06-19 RX ADMIN — GABAPENTIN 300 MILLIGRAM(S): 400 CAPSULE ORAL at 18:44

## 2019-06-19 RX ADMIN — ONDANSETRON 4 MILLIGRAM(S): 8 TABLET, FILM COATED ORAL at 05:07

## 2019-06-19 NOTE — PROGRESS NOTE ADULT - ASSESSMENT
Will follow KUB and schedule either ESWL or Ureteroscopy as out pt.   Can be discharged when medically stable.

## 2019-06-19 NOTE — CONSULT NOTE ADULT - ASSESSMENT
49 y/o F with PMH of herniated discs, anxiety, arthritis, fibromyalgia, long standing leukocytosis, cyclic vomiting syndrome, known left kidney stone admitted on 6/17 for evaluation of vomiting of 3 days duration and decreased oral intake. The patient also noted left sided flank pain. Upon admission was found to have obstructive stone with mild left hydronephrosis and underwent cystoscopy with left ureteral stent placement. Post op feeling improved however still with residual left sided flank pain.   1. Patient admitted with sepsis secondary to urinary origin due to nephrolithiasis  - patient is s/p ureteral stent placement with good relief  - iv hydration and supportive care   - urology evaluation in progress  - serial cbc and monitor temperature   - reviewed prior medical records to evaluate for resistant or atypical pathogens  - agree cefepime as ordered  - follow up cultures   2. other issues: per medicine

## 2019-06-19 NOTE — PROGRESS NOTE ADULT - ASSESSMENT
48F.  admitted 06/18/2019.  presented to ED c/o vomiting.  a/w flank pain.    PMHx:  DDD;  OA;  fibromyalgia;  anxiety.    severe sepsis POA.  possible pyelonephritis.  obstructive uropathy.  -lactic acid normalized.  -BCx,  -UCx,  -IVFs.  -cefepime.  -main management.  -ID consult.    L kidney stone.  urinary obstruction w/ L HN due to renal calculus.  -KUB.  -Urology f/u outpatient, ESWL v. ureteroscopy.    pulmonary nodules.  hx tobacco.  -Pulmonary f/u outpatient.    DVT prophylaxis.  -ambulation.    disposition.  -2S.    communication.  -RN.  -Case Management. 48F.  admitted 06/18/2019.  presented to ED c/o vomiting.  a/w flank pain.    PMHx:  DDD;  OA;  fibromyalgia;  anxiety.    severe sepsis POA.  possible pyelonephritis.  obstructive uropathy.  -lactic acid normalized.  -BCx,  -UCx,  -IVFs.  -cefepime.  -pain management.  -ID consult.    L kidney stone.  urinary obstruction w/ L HN due to renal calculus.  -KUB.  -Urology f/u outpatient, ESWL v. ureteroscopy.    pulmonary nodules.  hx tobacco.  -Pulmonary f/u outpatient.    DVT prophylaxis.  -ambulation.    disposition.  -2S.    communication.  -RN.  -Case Management.

## 2019-06-19 NOTE — PROGRESS NOTE ADULT - SUBJECTIVE AND OBJECTIVE BOX
CC:  Patient is a 48y old  Female who presents with a chief complaint of vomiting (19 Jun 2019 09:30)    SUBJECTIVE:     -no new complaints or issues at current time.    ROS:  all other review of systems are negative unless indicated above.    acetaminophen   Tablet .. 650 milliGRAM(s) Oral every 6 hours PRN  cefepime  Injectable. 1000 milliGRAM(s) IV Push every 12 hours  docusate sodium 100 milliGRAM(s) Oral three times a day PRN  gabapentin 300 milliGRAM(s) Oral two times a day  HYDROmorphone  Injectable 0.5 milliGRAM(s) IV Push every 4 hours PRN  ondansetron Injectable 4 milliGRAM(s) IV Push every 6 hours PRN  phenazopyridine 100 milliGRAM(s) Oral every 8 hours  senna 2 Tablet(s) Oral at bedtime PRN  sodium chloride 0.9%. 1000 milliLiter(s) IV Continuous <Continuous>    T(C): 37.1 (06-19-19 @ 04:19), Max: 37.4 (06-18-19 @ 18:25)  HR: 58 (06-19-19 @ 04:19) (55 - 90)  BP: 154/81 (06-19-19 @ 04:19) (132/81 - 154/81)  RR: 17 (06-18-19 @ 23:55) (12 - 18)  SpO2: 98% (06-19-19 @ 04:19) (94% - 100%)    Constitutional: NAD.   HEENT: PERRL, EOMI, MMM.  Neck: Soft and supple, No carotid bruit, No JVD  Respiratory: Breath sounds are clear bilaterally, No wheezing, rales or rhonchi  Cardiovascular: S1 and S2, regular rate and rhythm, no murmur, rub or gallop.  Gastrointestinal: Bowel Sounds present, soft, nontender, nondistended, no guarding, no rebound, no mass.  Extremities: No peripheral edema  Vascular: 2+ peripheral pulses  Neurological: A/O x , no focal deficits  Musculoskeletal: 5/5 strength b/l upper and lower extremities  Skin:  no visible rashes.                         12.5   15.61 )-----------( 297      ( 18 Jun 2019 04:07 )             37.3       06-18    143  |  107  |  11  ----------------------------<  99  3.1<L>   |  28  |  0.61    Ca    8.1<L>      18 Jun 2019 04:07  Phos  3.2     06-18  Mg     2.0     06-18    TPro  6.0  /  Alb  3.2<L>  /  TBili  0.5  /  DBili  x   /  AST  22  /  ALT  42  /  AlkPhos  66  06-18 CC:  Patient is a 48y old  Female who presents with a chief complaint of vomiting (19 Jun 2019 09:30)    SUBJECTIVE:     -PO fair to poor due to nausea.  -L flank soreness.    ROS:  all other review of systems are negative unless indicated above.    acetaminophen   Tablet .. 650 milliGRAM(s) Oral every 6 hours PRN  cefepime  Injectable. 1000 milliGRAM(s) IV Push every 12 hours  docusate sodium 100 milliGRAM(s) Oral three times a day PRN  gabapentin 300 milliGRAM(s) Oral two times a day  HYDROmorphone  Injectable 0.5 milliGRAM(s) IV Push every 4 hours PRN  ondansetron Injectable 4 milliGRAM(s) IV Push every 6 hours PRN  phenazopyridine 100 milliGRAM(s) Oral every 8 hours  senna 2 Tablet(s) Oral at bedtime PRN  sodium chloride 0.9%. 1000 milliLiter(s) IV Continuous <Continuous>    T(C): 37.1 (06-19-19 @ 04:19), Max: 37.4 (06-18-19 @ 18:25)  HR: 58 (06-19-19 @ 04:19) (55 - 90)  BP: 154/81 (06-19-19 @ 04:19) (132/81 - 154/81)  RR: 17 (06-18-19 @ 23:55) (12 - 18)  SpO2: 98% (06-19-19 @ 04:19) (94% - 100%)    Constitutional: NAD.   HEENT: PERRL, EOMI, MMM.  Neck: Soft and supple, No carotid bruit, No JVD  Respiratory: Breath sounds are clear bilaterally, No wheezing, rales or rhonchi  Cardiovascular: S1 and S2, regular rate and rhythm, no murmur, rub or gallop.  Gastrointestinal: Bowel Sounds present, soft, nontender, nondistended, no guarding, no rebound, no mass.  Extremities: No peripheral edema  Vascular: 2+ peripheral pulses  Neurological: A/O x , no focal deficits  Musculoskeletal: 5/5 strength b/l upper and lower extremities  Skin:  no visible rashes.                         12.5   15.61 )-----------( 297      ( 18 Jun 2019 04:07 )             37.3       06-18    143  |  107  |  11  ----------------------------<  99  3.1<L>   |  28  |  0.61    Ca    8.1<L>      18 Jun 2019 04:07  Phos  3.2     06-18  Mg     2.0     06-18    TPro  6.0  /  Alb  3.2<L>  /  TBili  0.5  /  DBili  x   /  AST  22  /  ALT  42  /  AlkPhos  66  06-18

## 2019-06-19 NOTE — PROVIDER CONTACT NOTE (OTHER) - SITUATION
pt c/o excrutiating sudden pain in flank radiating to back upon urination. pain relieved with dilaudid however occurred again. patient has not experienced this type of pain since admission

## 2019-06-19 NOTE — PROGRESS NOTE ADULT - SUBJECTIVE AND OBJECTIVE BOX
Patient is a 48y old  Female who presents with a chief complaint of vomiting (2019 14:45)    Feeling better overall than before, c/o left sided pain and pressure    Vital Signs Last 24 Hrs  T(C): 37.1 (2019 04:19), Max: 37.4 (2019 18:25)  T(F): 98.8 (2019 04:19), Max: 99.3 (2019 18:25)  HR: 58 (2019 04:19) (55 - 90)  BP: 154/81 (2019 04:19) (132/81 - 154/81)  BP(mean): --  RR: 17 (2019 23:55) (12 - 18)  SpO2: 98% (2019 04:19) (94% - 100%)  AAO x 3   Normal respiratory effort  Normal peripheral circulation  Abdomen- Soft, ND, NT     LABS:                        12.5   15.61 )-----------( 297      ( 2019 04:07 )             37.3     06-18    143  |  107  |  11  ----------------------------<  99  3.1<L>   |  28  |  0.61    Ca    8.1<L>      2019 04:07  Phos  3.2     06-18  Mg     2.0     18    TPro  6.0  /  Alb  3.2<L>  /  TBili  0.5  /  DBili  x   /  AST  22  /  ALT  42  /  AlkPhos  66  06-18      Urinalysis Basic - ( 2019 18:11 )    Color: Mikayla / Appearance: Slightly Turbid / S.020 / pH: x  Gluc: x / Ketone: Trace  / Bili: Small / Urobili: 4 mg/dL   Blood: x / Protein: 100 mg/dL / Nitrite: Negative   Leuk Esterase: Trace / RBC: >50 /HPF / WBC 6-10   Sq Epi: x / Non Sq Epi: Few / Bacteria: Occasional 47 yo female admitted after presenting with persistent vomiting.   In ED on CT scan: 7 mm left renal pelvic stone with mild hydronephrosis, elevated WBC and UA concerning for UTI.   Started on Cefepime. Was told 2 years ago that she has non obstructing kidney stone.   Also c/o lower abdominal pain, has chronic back pain.   Denies dysuria, hematuria, lower abdominal or flank pain, fever, chills or rigors.     : s/p Cystoscopy and left ureteral stent placement, POD#1  Feeling better overall than before, c/o left sided pain and pressure    Vital Signs Last 24 Hrs  T(C): 37.1 (2019 04:19), Max: 37.4 (2019 18:25)  T(F): 98.8 (2019 04:19), Max: 99.3 (2019 18:25)  HR: 58 (2019 04:19) (55 - 90)  BP: 154/81 (2019 04:19) (132/81 - 154/81)  BP(mean): --  RR: 17 (2019 23:55) (12 - 18)  SpO2: 98% (2019 04:19) (94% - 100%)  AAO x 3   Normal respiratory effort  Normal peripheral circulation  Abdomen- Soft, ND, NT     LABS:                        12.5   15.61 )-----------( 297      ( 2019 04:07 )             37.3     06-18    143  |  107  |  11  ----------------------------<  99  3.1<L>   |  28  |  0.61    Ca    8.1<L>      2019 04:07  Phos  3.2     06-18  Mg     2.0     -18    TPro  6.0  /  Alb  3.2<L>  /  TBili  0.5  /  DBili  x   /  AST  22  /  ALT  42  /  AlkPhos  66  -18      Urinalysis Basic - ( 2019 18:11 )    Color: Mikayla / Appearance: Slightly Turbid / S.020 / pH: x  Gluc: x / Ketone: Trace  / Bili: Small / Urobili: 4 mg/dL   Blood: x / Protein: 100 mg/dL / Nitrite: Negative   Leuk Esterase: Trace / RBC: >50 /HPF / WBC 6-10   Sq Epi: x / Non Sq Epi: Few / Bacteria: Occasional

## 2019-06-19 NOTE — CONSULT NOTE ADULT - SUBJECTIVE AND OBJECTIVE BOX
Patient is a 48y old  Female who presents with a chief complaint of vomiting (2019 10:07)    HPI:  47 y/o F with PMH of herniated discs, anxiety, arthritis, fibromyalgia, long standing leukocytosis, cyclic vomiting syndrome, known left kidney stone admitted on  for evaluation of vomiting of 3 days duration and decreased oral intake. The patient also noted left sided flank pain. Upon admission was found to have obstructive stone with mild left hydronephrosis and underwent cystoscopy with left ureteral stent placement. Post op feeling improved however still with residual left sided flank pain.           PSH: Excision of breast lipoma, carpal tunnel surgery, umbilical hernia repair, hysterectomy, choleystectomy, spinal surgery,          PMH: as above  PSH: as above  Meds: per reconciliation sheet, noted below  MEDICATIONS  (STANDING):  cefepime  Injectable. 1000 milliGRAM(s) IV Push every 12 hours  gabapentin 300 milliGRAM(s) Oral two times a day  phenazopyridine 100 milliGRAM(s) Oral every 8 hours  sodium chloride 0.9%. 1000 milliLiter(s) (125 mL/Hr) IV Continuous <Continuous>    MEDICATIONS  (PRN):  acetaminophen   Tablet .. 650 milliGRAM(s) Oral every 6 hours PRN Mild Pain (1 - 3)  docusate sodium 100 milliGRAM(s) Oral three times a day PRN Constipation  HYDROmorphone  Injectable 0.5 milliGRAM(s) IV Push every 4 hours PRN Moderate Pain (4 - 6)  ondansetron Injectable 4 milliGRAM(s) IV Push every 6 hours PRN Nausea and/or Vomiting  senna 2 Tablet(s) Oral at bedtime PRN Constipation    Allergies    morphine (Other)    Intolerances      Social: positive smoking, no alcohol, no illegal drugs; no recent travel, no exposure to TB  FAMILY HISTORY:     no history of premature cardiovascular disease in first degree relatives  ROS: the patient denies fever, no chills, no HA, no dizziness, no sore throat, no blurry vision, no CP, no palpitations, no SOB, no cough, no abdominal pain, no diarrhea,  no dysuria, no leg pain, no claudication, no rash, no joint aches, no rectal pain or bleeding, no night sweats  All other systems reviewed and are negative    Vital Signs Last 24 Hrs  T(C): 37.1 (2019 11:14), Max: 37.4 (2019 18:25)  T(F): 98.7 (2019 11:14), Max: 99.3 (2019 18:25)  HR: 69 (2019 11:14) (55 - 90)  BP: 133/67 (2019 11:14) (132/81 - 154/81)  BP(mean): --  RR: 18 (2019 11:14) (12 - 18)  SpO2: 97% (2019 11:14) (94% - 100%)  Daily     Daily     PE:    Constitutional: frail looking  HEENT: NC/AT, EOMI, PERRLA, conjunctivae clear; ears and nose atraumatic; pharynx clear  Neck: supple; thyroid not palpable  Back: no tenderness  Respiratory: respiratory effort normal; clear to auscultation  Cardiovascular: S1S2 regular, no murmurs  Abdomen: soft, not tender, not distended, positive BS; no liver or spleen organomegaly  Genitourinary: no suprapubic tenderness; left flank pain  Musculoskeletal: no muscle tenderness, no joint swelling or tenderness  Neurological/ Psychiatric: AxOx3, judgement and insight normal;  moving all extremities  Skin: no rashes; no palpable lesions    Labs: all available labs reviewed                        12.5   15.61 )-----------( 297      ( 2019 04:07 )             37.3     06-18    143  |  107  |  11  ----------------------------<  99  3.1<L>   |  28  |  0.61    Ca    8.1<L>      2019 04:07  Phos  3.2     06-18  Mg     2.0     -18    TPro  6.0  /  Alb  3.2<L>  /  TBili  0.5  /  DBili  x   /  AST  22  /  ALT  42  /  AlkPhos  66  -18     LIVER FUNCTIONS - ( 2019 04:07 )  Alb: 3.2 g/dL / Pro: 6.0 gm/dL / ALK PHOS: 66 U/L / ALT: 42 U/L / AST: 22 U/L / GGT: x           Urinalysis Basic - ( 2019 18:11 )    Color: Mikayla / Appearance: Slightly Turbid / S.020 / pH: x  Gluc: x / Ketone: Trace  / Bili: Small / Urobili: 4 mg/dL   Blood: x / Protein: 100 mg/dL / Nitrite: Negative   Leuk Esterase: Trace / RBC: >50 /HPF / WBC 6-10   Sq Epi: x / Non Sq Epi: Few / Bacteria: Occasional      Lactate, Blood (19 @ 04:07)    Lactate, Blood: 1.1 mmol/L        Lactate, Blood (19 @ 18:11)    Lactate, Blood: 3.0 mmol/L          < from: CT Abdomen and Pelvis w/ IV Cont (19 @ 22:26) >    EXAM:  CT ABDOMEN AND PELVIS IC                            PROCEDURE DATE:  2019          INTERPRETATION:  CT ABDOMEN AND PELVIS WITH CONTRAST    INDICATION: Vomiting and epigastric pain.    TECHNIQUE: Contrast enhanced CT of the abdomen and pelvis. Images are   reformatted in the sagittal and coronal planes.    90 mL of Omnipaque 350 contrast material was injected IV.    COMPARISON: CT torso 2019 and CT abdomen pelvis 2019.    FINDINGS:    Lower Thorax: No consolidation or effusion. Mild dependent atelectasis is   seen at the lung bases. At least two less than 5 mm subpleural based   nodules at the left lung base on image 16 of series 2 for which   nonemergent pulmonary imaging follow-up is advised.    Liver: No suspicious lesions.  Biliary: No significant biliary dilatation. Cholecystectomy.  Spleen: No suspicious lesions.  Pancreas: No inflammatory changes or ductal dilatation.  Adrenals: Normal.  Kidneys: Previously identified left lower pole renal 7mm stone progressed   to the left renal pelvis,  resulting in minimal left hydronephrosis.   Stable bilateral renal Indeterminate lesions measuring 1.2 on the right   side and 1.1 cm on the left side, unchanged since prior study.    Vessels: Normal caliber. Atherosclerotic disease of the aorta and its   branches.    GI tract: No evidence of small bowel obstruction. No wall thickening or   inflammatory changes. Diverticulosis without evidence of acute   diverticulitis. Normal appendix.    Peritoneum/retroperitoneumand mesentery: No free air. No organized fluid   collection. No adenopathy.    Pelvic organs/Bladder: No pelvic masses. Bladder is normal.    Abdominal wall: Unremarkable.  Bones and soft tissues: Multilevel degenerative changes of the spine are   noted. Status post L4-S1 posterior spinal fusion. L5 laminectomy. Stable   mild anterolisthesis of L5 and S1.      IMPRESSION:    Previously identified left lower pole renal 7mm stone progressed to the   left renal pelvis,  resulting in minimal left hydronephrosis. Stable   bilateral renal Indeterminate lesions measuring 1.2 on the right side and   1.1 cm on the left side, unchanged since prior study. Consider   nonemergent correlation with renal ultrasound for better characterization.    At least two less than 5 mm subpleural based nodules at the left lung   base for which nonemergent pulmonary imaging follow-up is advised.    Additional findings as mentioned above.    < end of copied text >          Radiology: all available radiological tests reviewed    Advanced directives addressed: full resuscitation

## 2019-06-19 NOTE — PROVIDER CONTACT NOTE (OTHER) - ACTION/TREATMENT ORDERED:
Office notified spoke with Abbey
dr D'Amico came to floor to evaluate patient. will continue to closely monitor

## 2019-06-20 ENCOUNTER — TRANSCRIPTION ENCOUNTER (OUTPATIENT)
Age: 49
End: 2019-06-20

## 2019-06-20 VITALS
HEART RATE: 55 BPM | SYSTOLIC BLOOD PRESSURE: 122 MMHG | RESPIRATION RATE: 17 BRPM | DIASTOLIC BLOOD PRESSURE: 58 MMHG | TEMPERATURE: 98 F | OXYGEN SATURATION: 97 %

## 2019-06-20 LAB
ANION GAP SERPL CALC-SCNC: 6 MMOL/L — SIGNIFICANT CHANGE UP (ref 5–17)
BUN SERPL-MCNC: 7 MG/DL — SIGNIFICANT CHANGE UP (ref 7–23)
CALCIUM SERPL-MCNC: 8.3 MG/DL — LOW (ref 8.5–10.1)
CHLORIDE SERPL-SCNC: 104 MMOL/L — SIGNIFICANT CHANGE UP (ref 96–108)
CO2 SERPL-SCNC: 31 MMOL/L — SIGNIFICANT CHANGE UP (ref 22–31)
CREAT SERPL-MCNC: 0.68 MG/DL — SIGNIFICANT CHANGE UP (ref 0.5–1.3)
GLUCOSE SERPL-MCNC: 111 MG/DL — HIGH (ref 70–99)
HCT VFR BLD CALC: 38.2 % — SIGNIFICANT CHANGE UP (ref 34.5–45)
HGB BLD-MCNC: 12.8 G/DL — SIGNIFICANT CHANGE UP (ref 11.5–15.5)
MCHC RBC-ENTMCNC: 29.4 PG — SIGNIFICANT CHANGE UP (ref 27–34)
MCHC RBC-ENTMCNC: 33.5 GM/DL — SIGNIFICANT CHANGE UP (ref 32–36)
MCV RBC AUTO: 87.6 FL — SIGNIFICANT CHANGE UP (ref 80–100)
PLATELET # BLD AUTO: 248 K/UL — SIGNIFICANT CHANGE UP (ref 150–400)
POTASSIUM SERPL-MCNC: 3.5 MMOL/L — SIGNIFICANT CHANGE UP (ref 3.5–5.3)
POTASSIUM SERPL-SCNC: 3.5 MMOL/L — SIGNIFICANT CHANGE UP (ref 3.5–5.3)
RBC # BLD: 4.36 M/UL — SIGNIFICANT CHANGE UP (ref 3.8–5.2)
RBC # FLD: 12.2 % — SIGNIFICANT CHANGE UP (ref 10.3–14.5)
SODIUM SERPL-SCNC: 141 MMOL/L — SIGNIFICANT CHANGE UP (ref 135–145)
WBC # BLD: 11.47 K/UL — HIGH (ref 3.8–10.5)
WBC # FLD AUTO: 11.47 K/UL — HIGH (ref 3.8–10.5)

## 2019-06-20 RX ORDER — KETOROLAC TROMETHAMINE 30 MG/ML
1 SYRINGE (ML) INJECTION
Qty: 4 | Refills: 0
Start: 2019-06-20

## 2019-06-20 RX ORDER — OXYCODONE HYDROCHLORIDE 5 MG/1
1 TABLET ORAL
Qty: 0 | Refills: 0 | DISCHARGE

## 2019-06-20 RX ORDER — ACETAMINOPHEN 500 MG
2 TABLET ORAL
Qty: 0 | Refills: 0 | DISCHARGE
Start: 2019-06-20

## 2019-06-20 RX ORDER — CEFUROXIME AXETIL 250 MG
500 TABLET ORAL EVERY 12 HOURS
Refills: 0 | Status: DISCONTINUED | OUTPATIENT
Start: 2019-06-20 | End: 2019-06-20

## 2019-06-20 RX ORDER — CEFUROXIME AXETIL 250 MG
1 TABLET ORAL
Qty: 22 | Refills: 0
Start: 2019-06-20 | End: 2019-06-30

## 2019-06-20 RX ORDER — LEVORPHANOL TARTRATE 3 MG/1
0 TABLET ORAL
Qty: 0 | Refills: 0 | DISCHARGE

## 2019-06-20 RX ORDER — KETOROLAC TROMETHAMINE 30 MG/ML
10 SYRINGE (ML) INJECTION EVERY 6 HOURS
Refills: 0 | Status: DISCONTINUED | OUTPATIENT
Start: 2019-06-20 | End: 2019-06-20

## 2019-06-20 RX ORDER — DOCUSATE SODIUM 100 MG
1 CAPSULE ORAL
Qty: 0 | Refills: 0 | DISCHARGE
Start: 2019-06-20

## 2019-06-20 RX ADMIN — HYDROMORPHONE HYDROCHLORIDE 0.5 MILLIGRAM(S): 2 INJECTION INTRAMUSCULAR; INTRAVENOUS; SUBCUTANEOUS at 07:17

## 2019-06-20 RX ADMIN — Medication 100 MILLIGRAM(S): at 13:43

## 2019-06-20 RX ADMIN — HYDROMORPHONE HYDROCHLORIDE 0.5 MILLIGRAM(S): 2 INJECTION INTRAMUSCULAR; INTRAVENOUS; SUBCUTANEOUS at 03:10

## 2019-06-20 RX ADMIN — Medication 650 MILLIGRAM(S): at 02:08

## 2019-06-20 RX ADMIN — Medication 100 MILLIGRAM(S): at 05:45

## 2019-06-20 RX ADMIN — Medication 650 MILLIGRAM(S): at 14:21

## 2019-06-20 RX ADMIN — ONDANSETRON 4 MILLIGRAM(S): 8 TABLET, FILM COATED ORAL at 05:44

## 2019-06-20 RX ADMIN — GABAPENTIN 300 MILLIGRAM(S): 400 CAPSULE ORAL at 05:45

## 2019-06-20 RX ADMIN — CEFEPIME 1000 MILLIGRAM(S): 1 INJECTION, POWDER, FOR SOLUTION INTRAMUSCULAR; INTRAVENOUS at 05:50

## 2019-06-20 NOTE — DISCHARGE NOTE PROVIDER - NSDCCPCAREPLAN_GEN_ALL_CORE_FT
PRINCIPAL DISCHARGE DIAGNOSIS  Diagnosis: Sepsis, due to unspecified organism  Assessment and Plan of Treatment:       SECONDARY DISCHARGE DIAGNOSES  Diagnosis: Urinary tract infection with hematuria, site unspecified  Assessment and Plan of Treatment:

## 2019-06-20 NOTE — DISCHARGE NOTE PROVIDER - CARE PROVIDER_API CALL
Felipe Obregon)  Urology  [address], [address] [place] [address]  Phone: [phone]  Fax: [phone]  Follow Up Time: 1-3 days    Durga Gamble (DO)  Family Medicine  [address] [facility] [address], [place] [address]  Phone: [phone]  Fax: [phone]  Follow Up Time: 1-3 days

## 2019-06-20 NOTE — PROGRESS NOTE ADULT - SUBJECTIVE AND OBJECTIVE BOX
CC:  Patient is a 48y old  Female who presents with a chief complaint of vomiting (20 Jun 2019 13:52)    SUBJECTIVE:     -patient reported ketorolac improved symptoms.  -ambulating.  -tolerating diet.  -voiding and having normal bowel movements.    ROS:  all other review of systems are negative unless indicated above.    acetaminophen   Tablet .. 650 milliGRAM(s) Oral every 6 hours PRN  cefuroxime   Tablet 500 milliGRAM(s) Oral every 12 hours  docusate sodium 100 milliGRAM(s) Oral three times a day PRN  gabapentin 300 milliGRAM(s) Oral two times a day  HYDROmorphone  Injectable 0.5 milliGRAM(s) IV Push every 4 hours PRN  ketorolac   Injectable 10 milliGRAM(s) IV Push every 6 hours PRN  ondansetron Injectable 4 milliGRAM(s) IV Push every 6 hours PRN  phenazopyridine 100 milliGRAM(s) Oral every 8 hours  senna 2 Tablet(s) Oral at bedtime PRN  sodium chloride 0.9%. 1000 milliLiter(s) IV Continuous <Continuous>    T(C): 36.9 (06-20-19 @ 10:40), Max: 36.9 (06-20-19 @ 02:07)  HR: 52 (06-20-19 @ 10:40) (52 - 67)  BP: 125/50 (06-20-19 @ 10:40) (117/55 - 152/75)  RR: 17 (06-20-19 @ 10:40) (17 - 18)  SpO2: 96% (06-20-19 @ 10:40) (96% - 98%)    Constitutional: NAD.   HEENT: PERRL, EOMI, MMM.  Neck: Soft and supple, No carotid bruit, No JVD  Respiratory: Breath sounds are clear bilaterally, No wheezing, rales or rhonchi  Cardiovascular: S1 and S2, regular rate and rhythm, no murmur, rub or gallop.  Gastrointestinal: Bowel Sounds present, soft, nontender, nondistended, no guarding, no rebound, no mass.  Extremities: No peripheral edema  Vascular: 2+ peripheral pulses  Neurological: A/O x , no focal deficits  Musculoskeletal: 5/5 strength b/l upper and lower extremities  Skin:  no visible rashes.                         12.8   11.47 )-----------( 248      ( 20 Jun 2019 10:37 )             38.2       06-20    141  |  104  |  7   ----------------------------<  111<H>  3.5   |  31  |  0.68    Ca    8.3<L>      20 Jun 2019 10:37

## 2019-06-20 NOTE — DISCHARGE NOTE PROVIDER - PROVIDER TOKENS
PROVIDER:[TOKEN:[[id number]],FOLLOWUP:[1-3 days]],PROVIDER:[TOKEN:[[id number]],FOLLOWUP:[1-3 days]]

## 2019-06-20 NOTE — DISCHARGE NOTE PROVIDER - HOSPITAL COURSE
48F.  admitted 06/18/2019.  presented to ED c/o vomiting.  a/w flank pain.        PMHx:  DDD;  OA;  fibromyalgia;  anxiety.        severe sepsis POA.  possible pyelonephritis.  obstructive uropathy.    -lactic acid normalized.    -BCx, no growth.    -UCx, no growth.    -tolerating PO.  d/c IVFs.    -Ceftin 500mg po q12h x 11 more days.    -ketorolac + Tylenol PRN.        L kidney stone.  urinary obstruction w/ L HN due to renal calculus.    -US exophytic R lower pole cyst.    -06/19 KUB, L ureteral stent.    -Urology f/u outpatient, ESWL v. ureteroscopy.        pulmonary nodules.  hx tobacco.    -CT AB/pelvis, 2 < 5mm subpleural based nodules L lung base.  nonemergent pulmonary imaging f/u advised.    -patient advised of above.  explained lung CA risk given hx of tobocco and need to f/u w/ Dr. Gamble regarding need for chest CT.    -PMD f/u outpatient.        disposition.    -may discharge today.    -d/c > 35 minutes.        communication.    -RN.    -case management.    -PMD, Dr. Durga Gamble.  case discussed.

## 2019-06-20 NOTE — PROGRESS NOTE ADULT - SUBJECTIVE AND OBJECTIVE BOX
Patient is a 48y old  Female who presents with a chief complaint of vomiting (19 Jun 2019 10:07)    Date of service: 06-20-19 @ 13:53      Patient lying in bed; afebrile      ROS: no fever or chills; denies dizziness, no HA, no SOB or cough, no abdominal pain, no diarrhea or constipation; no dysuria, no urinary frequency, no legs pain, no rashes    MEDICATIONS  (STANDING):  cefepime  Injectable. 1000 milliGRAM(s) IV Push every 12 hours  gabapentin 300 milliGRAM(s) Oral two times a day  phenazopyridine 100 milliGRAM(s) Oral every 8 hours  sodium chloride 0.9%. 1000 milliLiter(s) (125 mL/Hr) IV Continuous <Continuous>    MEDICATIONS  (PRN):  acetaminophen   Tablet .. 650 milliGRAM(s) Oral every 6 hours PRN Mild Pain (1 - 3)  docusate sodium 100 milliGRAM(s) Oral three times a day PRN Constipation  HYDROmorphone  Injectable 0.5 milliGRAM(s) IV Push every 4 hours PRN Moderate Pain (4 - 6)  ketorolac   Injectable 10 milliGRAM(s) IV Push every 6 hours PRN Severe Pain (7 - 10)  ondansetron Injectable 4 milliGRAM(s) IV Push every 6 hours PRN Nausea and/or Vomiting  senna 2 Tablet(s) Oral at bedtime PRN Constipation      Vital Signs Last 24 Hrs  T(C): 36.9 (20 Jun 2019 10:40), Max: 37.5 (19 Jun 2019 14:34)  T(F): 98.5 (20 Jun 2019 10:40), Max: 99.5 (19 Jun 2019 14:34)  HR: 52 (20 Jun 2019 10:40) (52 - 67)  BP: 125/50 (20 Jun 2019 10:40) (117/55 - 153/81)  BP(mean): --  RR: 17 (20 Jun 2019 10:40) (17 - 18)  SpO2: 96% (20 Jun 2019 10:40) (96% - 98%)    Physical Exam:    PE:    Constitutional: frail looking  HEENT: NC/AT, EOMI, PERRLA, conjunctivae clear; ears and nose atraumatic; pharynx clear  Neck: supple; thyroid not palpable  Back: no tenderness  Respiratory: respiratory effort normal; clear to auscultation  Cardiovascular: S1S2 regular, no murmurs  Abdomen: soft, not tender, not distended, positive BS; no liver or spleen organomegaly  Genitourinary: no suprapubic tenderness; left flank pain  Musculoskeletal: no muscle tenderness, no joint swelling or tenderness  Neurological/ Psychiatric: AxOx3, judgement and insight normal;  moving all extremities  Skin: no rashes; no palpable lesions    Labs: all available labs reviewed                          Labs:                        12.8   11.47 )-----------( 248      ( 20 Jun 2019 10:37 )             38.2     06-20    141  |  104  |  7   ----------------------------<  111<H>  3.5   |  31  |  0.68    Ca    8.3<L>      20 Jun 2019 10:37             Cultures:       Culture - Acid Fast - Urine (collected 06-18-19 @ 05:45)  Source: .Urine LEFT KIDNEY STONE    Culture - Fungal, Other (collected 06-18-19 @ 05:45)  Source: .Urine LEFT KIDNEY STONE  Preliminary Report (06-20-19 @ 09:16):    Testing in progress    Culture - Urine (collected 06-18-19 @ 05:45)  Source: .Urine BLADDER URINE CULTURE  Preliminary Report (06-20-19 @ 10:44):    No growth to date.    Culture - Blood (collected 06-18-19 @ 01:08)  Source: .Blood None  Preliminary Report (06-19-19 @ 11:01):    No growth to date.    Culture - Blood (collected 06-18-19 @ 01:08)  Source: .Blood None  Preliminary Report (06-19-19 @ 11:01):    No growth to date.          < from: CT Abdomen and Pelvis w/ IV Cont (06.17.19 @ 22:26) >    EXAM:  CT ABDOMEN AND PELVIS IC                            PROCEDURE DATE:  06/17/2019          INTERPRETATION:  CT ABDOMEN AND PELVIS WITH CONTRAST    INDICATION: Vomiting and epigastric pain.    TECHNIQUE: Contrast enhanced CT of the abdomen and pelvis. Images are   reformatted in the sagittal and coronal planes.    90 mL of Omnipaque 350 contrast material was injected IV.    COMPARISON: CT torso 5/26/2019 and CT abdomen pelvis 1/31/2019.    FINDINGS:    Lower Thorax: No consolidation or effusion. Mild dependent atelectasis is   seen at the lung bases. At least two less than 5 mm subpleural based   nodules at the left lung base on image 16 of series 2 for which   nonemergent pulmonary imaging follow-up is advised.    Liver: No suspicious lesions.  Biliary: No significant biliary dilatation. Cholecystectomy.  Spleen: No suspicious lesions.  Pancreas: No inflammatory changes or ductal dilatation.  Adrenals: Normal.  Kidneys: Previously identified left lower pole renal 7mm stone progressed   to the left renal pelvis,  resulting in minimal left hydronephrosis.   Stable bilateral renal Indeterminate lesions measuring 1.2 on the right   side and 1.1 cm on the left side, unchanged since prior study.    Vessels: Normal caliber. Atherosclerotic disease of the aorta and its   branches.    GI tract: No evidence of small bowel obstruction. No wall thickening or   inflammatory changes. Diverticulosis without evidence of acute   diverticulitis. Normal appendix.    Peritoneum/retroperitoneumand mesentery: No free air. No organized fluid   collection. No adenopathy.    Pelvic organs/Bladder: No pelvic masses. Bladder is normal.    Abdominal wall: Unremarkable.  Bones and soft tissues: Multilevel degenerative changes of the spine are   noted. Status post L4-S1 posterior spinal fusion. L5 laminectomy. Stable   mild anterolisthesis of L5 and S1.      IMPRESSION:    Previously identified left lower pole renal 7mm stone progressed to the   left renal pelvis,  resulting in minimal left hydronephrosis. Stable   bilateral renal Indeterminate lesions measuring 1.2 on the right side and   1.1 cm on the left side, unchanged since prior study. Consider   nonemergent correlation with renal ultrasound for better characterization.    At least two less than 5 mm subpleural based nodules at the left lung   base for which nonemergent pulmonary imaging follow-up is advised.    Additional findings as mentioned above.    < end of copied text >          Radiology: all available radiological tests reviewed    Advanced directives addressed: full resuscitation

## 2019-06-20 NOTE — PROGRESS NOTE ADULT - ASSESSMENT
47 y/o F with PMH of herniated discs, anxiety, arthritis, fibromyalgia, long standing leukocytosis, cyclic vomiting syndrome, known left kidney stone admitted on 6/17 for evaluation of vomiting of 3 days duration and decreased oral intake. The patient also noted left sided flank pain. Upon admission was found to have obstructive stone with mild left hydronephrosis and underwent cystoscopy with left ureteral stent placement. Post op feeling improved however still with residual left sided flank pain.   1. Patient admitted with sepsis secondary to urinary origin due to nephrolithiasis  - patient is s/p ureteral stent placement with good relief  - iv hydration and supportive care   - urology evaluation in progress  - serial cbc and monitor temperature   - reviewed prior medical records to evaluate for resistant or atypical pathogens  - will change cefepime to ceftin 500 mg po q 12 hours for total 14 days rx  - will follow up as outpatient with urology  - follow up cultures --- no growth  2. other issues: per medicine

## 2019-06-21 LAB
CULTURE RESULTS: SIGNIFICANT CHANGE UP
SPECIMEN SOURCE: SIGNIFICANT CHANGE UP

## 2019-06-23 LAB
CULTURE RESULTS: SIGNIFICANT CHANGE UP
CULTURE RESULTS: SIGNIFICANT CHANGE UP
SPECIMEN SOURCE: SIGNIFICANT CHANGE UP
SPECIMEN SOURCE: SIGNIFICANT CHANGE UP

## 2019-06-24 DIAGNOSIS — F17.210 NICOTINE DEPENDENCE, CIGARETTES, UNCOMPLICATED: ICD-10-CM

## 2019-06-24 DIAGNOSIS — A41.9 SEPSIS, UNSPECIFIED ORGANISM: ICD-10-CM

## 2019-06-24 DIAGNOSIS — N28.1 CYST OF KIDNEY, ACQUIRED: ICD-10-CM

## 2019-06-24 DIAGNOSIS — M79.7 FIBROMYALGIA: ICD-10-CM

## 2019-06-24 DIAGNOSIS — R31.9 HEMATURIA, UNSPECIFIED: ICD-10-CM

## 2019-06-24 DIAGNOSIS — R65.20 SEVERE SEPSIS WITHOUT SEPTIC SHOCK: ICD-10-CM

## 2019-06-24 DIAGNOSIS — F41.9 ANXIETY DISORDER, UNSPECIFIED: ICD-10-CM

## 2019-06-24 DIAGNOSIS — N13.6 PYONEPHROSIS: ICD-10-CM

## 2019-07-09 ENCOUNTER — OTHER (OUTPATIENT)
Age: 49
End: 2019-07-09

## 2019-07-09 ENCOUNTER — APPOINTMENT (OUTPATIENT)
Dept: UROLOGY | Facility: HOSPITAL | Age: 49
End: 2019-07-09

## 2019-07-09 ENCOUNTER — OUTPATIENT (OUTPATIENT)
Dept: OUTPATIENT SERVICES | Facility: HOSPITAL | Age: 49
LOS: 1 days | Discharge: HOME | End: 2019-07-09
Payer: COMMERCIAL

## 2019-07-09 ENCOUNTER — RESULT REVIEW (OUTPATIENT)
Age: 49
End: 2019-07-09

## 2019-07-09 VITALS
DIASTOLIC BLOOD PRESSURE: 75 MMHG | RESPIRATION RATE: 16 BRPM | HEART RATE: 72 BPM | OXYGEN SATURATION: 98 % | TEMPERATURE: 98 F | SYSTOLIC BLOOD PRESSURE: 138 MMHG

## 2019-07-09 VITALS
DIASTOLIC BLOOD PRESSURE: 67 MMHG | HEIGHT: 62 IN | HEART RATE: 77 BPM | RESPIRATION RATE: 16 BRPM | TEMPERATURE: 98 F | WEIGHT: 220.46 LBS | OXYGEN SATURATION: 97 % | SYSTOLIC BLOOD PRESSURE: 123 MMHG

## 2019-07-09 DIAGNOSIS — Z90.49 ACQUIRED ABSENCE OF OTHER SPECIFIED PARTS OF DIGESTIVE TRACT: Chronic | ICD-10-CM

## 2019-07-09 DIAGNOSIS — Z98.1 ARTHRODESIS STATUS: Chronic | ICD-10-CM

## 2019-07-09 DIAGNOSIS — G56.01 CARPAL TUNNEL SYNDROME, RIGHT UPPER LIMB: Chronic | ICD-10-CM

## 2019-07-09 DIAGNOSIS — Z98.891 HISTORY OF UTERINE SCAR FROM PREVIOUS SURGERY: Chronic | ICD-10-CM

## 2019-07-09 DIAGNOSIS — Z90.710 ACQUIRED ABSENCE OF BOTH CERVIX AND UTERUS: Chronic | ICD-10-CM

## 2019-07-09 DIAGNOSIS — N60.09 SOLITARY CYST OF UNSPECIFIED BREAST: Chronic | ICD-10-CM

## 2019-07-09 DIAGNOSIS — Z98.890 OTHER SPECIFIED POSTPROCEDURAL STATES: Chronic | ICD-10-CM

## 2019-07-09 DIAGNOSIS — G56.02 CARPAL TUNNEL SYNDROME, LEFT UPPER LIMB: Chronic | ICD-10-CM

## 2019-07-09 LAB
APPEARANCE: CLEAR
BACTERIA UR CULT: NORMAL
BACTERIA: NEGATIVE
BILIRUBIN URINE: NEGATIVE
BLOOD URINE: ABNORMAL
COLOR: NORMAL
GLUCOSE QUALITATIVE U: NEGATIVE
HCG UR QL: NEGATIVE — SIGNIFICANT CHANGE UP
HYALINE CASTS: 0 /LPF
KETONES URINE: NEGATIVE
LEUKOCYTE ESTERASE URINE: NEGATIVE
MICROSCOPIC-UA: NORMAL
NITRITE URINE: NEGATIVE
PH URINE: 7
PROTEIN URINE: NORMAL
RED BLOOD CELLS URINE: 5 /HPF
SPECIFIC GRAVITY URINE: 1.01
SQUAMOUS EPITHELIAL CELLS: 3 /HPF
UROBILINOGEN URINE: NORMAL
WHITE BLOOD CELLS URINE: 2 /HPF

## 2019-07-09 PROCEDURE — 88300 SURGICAL PATH GROSS: CPT | Mod: 26

## 2019-07-09 PROCEDURE — 52356 CYSTO/URETERO W/LITHOTRIPSY: CPT | Mod: LT

## 2019-07-09 RX ORDER — OXYCODONE HYDROCHLORIDE 5 MG/1
5 TABLET ORAL ONCE
Refills: 0 | Status: DISCONTINUED | OUTPATIENT
Start: 2019-07-09 | End: 2019-07-09

## 2019-07-09 RX ORDER — SODIUM CHLORIDE 9 MG/ML
1000 INJECTION, SOLUTION INTRAVENOUS
Refills: 0 | Status: DISCONTINUED | OUTPATIENT
Start: 2019-07-09 | End: 2019-07-09

## 2019-07-09 RX ORDER — FENTANYL CITRATE 50 UG/ML
50 INJECTION INTRAVENOUS
Refills: 0 | Status: DISCONTINUED | OUTPATIENT
Start: 2019-07-09 | End: 2019-07-09

## 2019-07-09 RX ORDER — MEPERIDINE HYDROCHLORIDE 50 MG/ML
12.5 INJECTION INTRAMUSCULAR; INTRAVENOUS; SUBCUTANEOUS
Refills: 0 | Status: DISCONTINUED | OUTPATIENT
Start: 2019-07-09 | End: 2019-07-09

## 2019-07-09 RX ORDER — GABAPENTIN 400 MG/1
0 CAPSULE ORAL
Qty: 0 | Refills: 0 | DISCHARGE

## 2019-07-09 RX ORDER — PHENAZOPYRIDINE HCL 100 MG
1 TABLET ORAL
Qty: 9 | Refills: 0
Start: 2019-07-09 | End: 2019-07-11

## 2019-07-09 RX ORDER — ACETAMINOPHEN 500 MG
1000 TABLET ORAL ONCE
Refills: 0 | Status: COMPLETED | OUTPATIENT
Start: 2019-07-09 | End: 2019-07-09

## 2019-07-09 RX ORDER — PHENAZOPYRIDINE HCL 100 MG
200 TABLET ORAL ONCE
Refills: 0 | Status: COMPLETED | OUTPATIENT
Start: 2019-07-09 | End: 2019-07-09

## 2019-07-09 RX ORDER — ONDANSETRON 8 MG/1
4 TABLET, FILM COATED ORAL ONCE
Refills: 0 | Status: DISCONTINUED | OUTPATIENT
Start: 2019-07-09 | End: 2019-07-09

## 2019-07-09 RX ADMIN — FENTANYL CITRATE 50 MICROGRAM(S): 50 INJECTION INTRAVENOUS at 11:01

## 2019-07-09 RX ADMIN — Medication 400 MILLIGRAM(S): at 10:16

## 2019-07-09 RX ADMIN — FENTANYL CITRATE 50 MICROGRAM(S): 50 INJECTION INTRAVENOUS at 10:31

## 2019-07-09 RX ADMIN — Medication 1000 MILLIGRAM(S): at 10:17

## 2019-07-09 RX ADMIN — Medication 200 MILLIGRAM(S): at 10:30

## 2019-07-09 NOTE — ASU PATIENT PROFILE, ADULT - PMH
Anxiety    Arthritis    Breast mass    Cyclical vomiting    Fibromyalgia    Renal stone  left Anxiety    Arthritis    Breast mass    Cyclical vomiting    Fibromyalgia    History of rib fracture  right  Renal stone  left

## 2019-07-09 NOTE — ASU DISCHARGE PLAN (ADULT/PEDIATRIC) - CALL YOUR DOCTOR IF YOU HAVE ANY OF THE FOLLOWING:
Pain not relieved by Medications/Fever greater than (need to indicate Fahrenheit or Celsius)/Nausea and vomiting that does not stop/Inability to tolerate liquids or foods

## 2019-07-10 LAB — SURGICAL PATHOLOGY STUDY: SIGNIFICANT CHANGE UP

## 2019-07-14 DIAGNOSIS — N20.0 CALCULUS OF KIDNEY: ICD-10-CM

## 2019-07-14 DIAGNOSIS — F17.210 NICOTINE DEPENDENCE, CIGARETTES, UNCOMPLICATED: ICD-10-CM

## 2019-07-14 DIAGNOSIS — M79.7 FIBROMYALGIA: ICD-10-CM

## 2019-07-16 LAB — COMPN STONE: SIGNIFICANT CHANGE UP

## 2019-07-17 ENCOUNTER — APPOINTMENT (OUTPATIENT)
Dept: UROLOGY | Facility: CLINIC | Age: 49
End: 2019-07-17
Payer: COMMERCIAL

## 2019-07-17 ENCOUNTER — LABORATORY RESULT (OUTPATIENT)
Age: 49
End: 2019-07-17

## 2019-07-17 VITALS — DIASTOLIC BLOOD PRESSURE: 75 MMHG | OXYGEN SATURATION: 97 % | SYSTOLIC BLOOD PRESSURE: 104 MMHG | HEART RATE: 95 BPM

## 2019-07-17 DIAGNOSIS — N20.0 CALCULUS OF KIDNEY: ICD-10-CM

## 2019-07-17 DIAGNOSIS — Z46.6 ENCOUNTER FOR FITTING AND ADJUSTMENT OF URINARY DEVICE: ICD-10-CM

## 2019-07-17 PROBLEM — Z87.81 PERSONAL HISTORY OF (HEALED) TRAUMATIC FRACTURE: Chronic | Status: ACTIVE | Noted: 2019-07-09

## 2019-07-17 LAB
CULTURE RESULTS: SIGNIFICANT CHANGE UP
SPECIMEN SOURCE: SIGNIFICANT CHANGE UP

## 2019-07-17 PROCEDURE — 52310 CYSTOSCOPY AND TREATMENT: CPT

## 2019-07-17 RX ORDER — CIPROFLOXACIN HYDROCHLORIDE 500 MG/1
500 TABLET, FILM COATED ORAL
Qty: 14 | Refills: 0 | Status: ACTIVE | COMMUNITY
Start: 2019-07-17 | End: 1900-01-01

## 2019-07-18 LAB
APPEARANCE: ABNORMAL
BILIRUBIN URINE: NEGATIVE
BLOOD URINE: ABNORMAL
COLOR: ABNORMAL
GLUCOSE QUALITATIVE U: NEGATIVE
KETONES URINE: NEGATIVE
LEUKOCYTE ESTERASE URINE: ABNORMAL
NITRITE URINE: NEGATIVE
PH URINE: 6
PROTEIN URINE: ABNORMAL
SPECIFIC GRAVITY URINE: 1.02
UROBILINOGEN URINE: NORMAL

## 2019-08-07 LAB
CULTURE RESULTS: SIGNIFICANT CHANGE UP
SPECIMEN SOURCE: SIGNIFICANT CHANGE UP

## 2019-09-04 ENCOUNTER — OUTPATIENT (OUTPATIENT)
Dept: OUTPATIENT SERVICES | Facility: HOSPITAL | Age: 49
LOS: 1 days | End: 2019-09-04
Payer: COMMERCIAL

## 2019-09-04 ENCOUNTER — APPOINTMENT (OUTPATIENT)
Dept: UROLOGY | Facility: CLINIC | Age: 49
End: 2019-09-04

## 2019-09-04 ENCOUNTER — APPOINTMENT (OUTPATIENT)
Dept: ULTRASOUND IMAGING | Facility: CLINIC | Age: 49
End: 2019-09-04
Payer: COMMERCIAL

## 2019-09-04 DIAGNOSIS — N60.09 SOLITARY CYST OF UNSPECIFIED BREAST: Chronic | ICD-10-CM

## 2019-09-04 DIAGNOSIS — Z98.891 HISTORY OF UTERINE SCAR FROM PREVIOUS SURGERY: Chronic | ICD-10-CM

## 2019-09-04 DIAGNOSIS — Z00.8 ENCOUNTER FOR OTHER GENERAL EXAMINATION: ICD-10-CM

## 2019-09-04 DIAGNOSIS — Z98.890 OTHER SPECIFIED POSTPROCEDURAL STATES: Chronic | ICD-10-CM

## 2019-09-04 DIAGNOSIS — G56.01 CARPAL TUNNEL SYNDROME, RIGHT UPPER LIMB: Chronic | ICD-10-CM

## 2019-09-04 DIAGNOSIS — Z90.49 ACQUIRED ABSENCE OF OTHER SPECIFIED PARTS OF DIGESTIVE TRACT: Chronic | ICD-10-CM

## 2019-09-04 DIAGNOSIS — G56.02 CARPAL TUNNEL SYNDROME, LEFT UPPER LIMB: Chronic | ICD-10-CM

## 2019-09-04 DIAGNOSIS — Z98.1 ARTHRODESIS STATUS: Chronic | ICD-10-CM

## 2019-09-04 DIAGNOSIS — Z90.710 ACQUIRED ABSENCE OF BOTH CERVIX AND UTERUS: Chronic | ICD-10-CM

## 2019-09-04 PROCEDURE — 76775 US EXAM ABDO BACK WALL LIM: CPT

## 2019-09-04 PROCEDURE — 76775 US EXAM ABDO BACK WALL LIM: CPT | Mod: 26

## 2019-11-19 NOTE — ED PROCEDURE NOTE - EBL
minimal Consent was obtained from the patient. The risks and benefits to therapy were discussed in detail. Specifically, the risks of infection, scarring, bleeding, prolonged wound healing, incomplete removal, allergy to anesthesia, nerve injury and recurrence were addressed. Prior to the procedure, the treatment site was clearly identified and confirmed by the patient. All components of Universal Protocol/PAUSE Rule completed.

## 2020-10-13 NOTE — ASU DISCHARGE PLAN (ADULT/PEDIATRIC). - B. DRINK ALCOHOL, BEER, OR WINE
Statement Selected Rhofade Pregnancy And Lactation Text: This medication has not been assigned a Pregnancy Risk Category. It is unknown if the medication is excreted in breast milk.

## 2020-11-13 ENCOUNTER — EMERGENCY (EMERGENCY)
Facility: HOSPITAL | Age: 50
LOS: 0 days | Discharge: HOME | End: 2020-11-13
Attending: EMERGENCY MEDICINE
Payer: COMMERCIAL

## 2020-11-13 VITALS — HEIGHT: 62 IN | WEIGHT: 220.02 LBS

## 2020-11-13 VITALS
SYSTOLIC BLOOD PRESSURE: 128 MMHG | TEMPERATURE: 98 F | HEART RATE: 67 BPM | RESPIRATION RATE: 16 BRPM | OXYGEN SATURATION: 97 % | DIASTOLIC BLOOD PRESSURE: 68 MMHG

## 2020-11-13 DIAGNOSIS — Z98.890 OTHER SPECIFIED POSTPROCEDURAL STATES: Chronic | ICD-10-CM

## 2020-11-13 DIAGNOSIS — Z90.49 ACQUIRED ABSENCE OF OTHER SPECIFIED PARTS OF DIGESTIVE TRACT: Chronic | ICD-10-CM

## 2020-11-13 DIAGNOSIS — Z98.1 ARTHRODESIS STATUS: Chronic | ICD-10-CM

## 2020-11-13 DIAGNOSIS — M79.7 FIBROMYALGIA: ICD-10-CM

## 2020-11-13 DIAGNOSIS — M19.90 UNSPECIFIED OSTEOARTHRITIS, UNSPECIFIED SITE: ICD-10-CM

## 2020-11-13 DIAGNOSIS — Z98.891 HISTORY OF UTERINE SCAR FROM PREVIOUS SURGERY: Chronic | ICD-10-CM

## 2020-11-13 DIAGNOSIS — Z88.5 ALLERGY STATUS TO NARCOTIC AGENT: ICD-10-CM

## 2020-11-13 DIAGNOSIS — M79.662 PAIN IN LEFT LOWER LEG: ICD-10-CM

## 2020-11-13 DIAGNOSIS — M25.562 PAIN IN LEFT KNEE: ICD-10-CM

## 2020-11-13 DIAGNOSIS — G56.02 CARPAL TUNNEL SYNDROME, LEFT UPPER LIMB: Chronic | ICD-10-CM

## 2020-11-13 DIAGNOSIS — G56.01 CARPAL TUNNEL SYNDROME, RIGHT UPPER LIMB: Chronic | ICD-10-CM

## 2020-11-13 DIAGNOSIS — R21 RASH AND OTHER NONSPECIFIC SKIN ERUPTION: ICD-10-CM

## 2020-11-13 DIAGNOSIS — Z90.710 ACQUIRED ABSENCE OF BOTH CERVIX AND UTERUS: Chronic | ICD-10-CM

## 2020-11-13 DIAGNOSIS — N60.09 SOLITARY CYST OF UNSPECIFIED BREAST: Chronic | ICD-10-CM

## 2020-11-13 DIAGNOSIS — F41.9 ANXIETY DISORDER, UNSPECIFIED: ICD-10-CM

## 2020-11-13 LAB
ALBUMIN SERPL ELPH-MCNC: 3.6 G/DL — SIGNIFICANT CHANGE UP (ref 3.3–5)
ALP SERPL-CCNC: 82 U/L — SIGNIFICANT CHANGE UP (ref 40–120)
ALT FLD-CCNC: 24 U/L — SIGNIFICANT CHANGE UP (ref 12–78)
ANION GAP SERPL CALC-SCNC: 4 MMOL/L — LOW (ref 5–17)
APTT BLD: 34.6 SEC — SIGNIFICANT CHANGE UP (ref 27.5–35.5)
AST SERPL-CCNC: 14 U/L — LOW (ref 15–37)
BASOPHILS # BLD AUTO: 0.05 K/UL — SIGNIFICANT CHANGE UP (ref 0–0.2)
BASOPHILS NFR BLD AUTO: 0.4 % — SIGNIFICANT CHANGE UP (ref 0–2)
BILIRUB SERPL-MCNC: 0.2 MG/DL — SIGNIFICANT CHANGE UP (ref 0.2–1.2)
BUN SERPL-MCNC: 10 MG/DL — SIGNIFICANT CHANGE UP (ref 7–23)
CALCIUM SERPL-MCNC: 8.7 MG/DL — SIGNIFICANT CHANGE UP (ref 8.5–10.1)
CHLORIDE SERPL-SCNC: 107 MMOL/L — SIGNIFICANT CHANGE UP (ref 96–108)
CO2 SERPL-SCNC: 29 MMOL/L — SIGNIFICANT CHANGE UP (ref 22–31)
CREAT SERPL-MCNC: 0.71 MG/DL — SIGNIFICANT CHANGE UP (ref 0.5–1.3)
EOSINOPHIL # BLD AUTO: 0.33 K/UL — SIGNIFICANT CHANGE UP (ref 0–0.5)
EOSINOPHIL NFR BLD AUTO: 2.9 % — SIGNIFICANT CHANGE UP (ref 0–6)
GLUCOSE SERPL-MCNC: 96 MG/DL — SIGNIFICANT CHANGE UP (ref 70–99)
HCG SERPL-ACNC: <1 MIU/ML — SIGNIFICANT CHANGE UP
HCT VFR BLD CALC: 39.2 % — SIGNIFICANT CHANGE UP (ref 34.5–45)
HGB BLD-MCNC: 12.9 G/DL — SIGNIFICANT CHANGE UP (ref 11.5–15.5)
IMM GRANULOCYTES NFR BLD AUTO: 0.5 % — SIGNIFICANT CHANGE UP (ref 0–1.5)
INR BLD: 1 RATIO — SIGNIFICANT CHANGE UP (ref 0.88–1.16)
LYMPHOCYTES # BLD AUTO: 19.2 % — SIGNIFICANT CHANGE UP (ref 13–44)
LYMPHOCYTES # BLD AUTO: 2.2 K/UL — SIGNIFICANT CHANGE UP (ref 1–3.3)
MCHC RBC-ENTMCNC: 29.2 PG — SIGNIFICANT CHANGE UP (ref 27–34)
MCHC RBC-ENTMCNC: 32.9 GM/DL — SIGNIFICANT CHANGE UP (ref 32–36)
MCV RBC AUTO: 88.7 FL — SIGNIFICANT CHANGE UP (ref 80–100)
MONOCYTES # BLD AUTO: 0.7 K/UL — SIGNIFICANT CHANGE UP (ref 0–0.9)
MONOCYTES NFR BLD AUTO: 6.1 % — SIGNIFICANT CHANGE UP (ref 2–14)
NEUTROPHILS # BLD AUTO: 8.13 K/UL — HIGH (ref 1.8–7.4)
NEUTROPHILS NFR BLD AUTO: 70.9 % — SIGNIFICANT CHANGE UP (ref 43–77)
PLATELET # BLD AUTO: 286 K/UL — SIGNIFICANT CHANGE UP (ref 150–400)
POTASSIUM SERPL-MCNC: 3.7 MMOL/L — SIGNIFICANT CHANGE UP (ref 3.5–5.3)
POTASSIUM SERPL-SCNC: 3.7 MMOL/L — SIGNIFICANT CHANGE UP (ref 3.5–5.3)
PROT SERPL-MCNC: 7.1 GM/DL — SIGNIFICANT CHANGE UP (ref 6–8.3)
PROTHROM AB SERPL-ACNC: 11.7 SEC — SIGNIFICANT CHANGE UP (ref 10.6–13.6)
RBC # BLD: 4.42 M/UL — SIGNIFICANT CHANGE UP (ref 3.8–5.2)
RBC # FLD: 13.5 % — SIGNIFICANT CHANGE UP (ref 10.3–14.5)
SODIUM SERPL-SCNC: 140 MMOL/L — SIGNIFICANT CHANGE UP (ref 135–145)
WBC # BLD: 11.47 K/UL — HIGH (ref 3.8–10.5)
WBC # FLD AUTO: 11.47 K/UL — HIGH (ref 3.8–10.5)

## 2020-11-13 PROCEDURE — 85610 PROTHROMBIN TIME: CPT

## 2020-11-13 PROCEDURE — 99282 EMERGENCY DEPT VISIT SF MDM: CPT

## 2020-11-13 PROCEDURE — 84702 CHORIONIC GONADOTROPIN TEST: CPT

## 2020-11-13 PROCEDURE — 99284 EMERGENCY DEPT VISIT MOD MDM: CPT | Mod: 25

## 2020-11-13 PROCEDURE — 80053 COMPREHEN METABOLIC PANEL: CPT

## 2020-11-13 PROCEDURE — 85025 COMPLETE CBC W/AUTO DIFF WBC: CPT

## 2020-11-13 PROCEDURE — 85730 THROMBOPLASTIN TIME PARTIAL: CPT

## 2020-11-13 PROCEDURE — 93971 EXTREMITY STUDY: CPT | Mod: LT

## 2020-11-13 PROCEDURE — 36415 COLL VENOUS BLD VENIPUNCTURE: CPT

## 2020-11-13 PROCEDURE — 96374 THER/PROPH/DIAG INJ IV PUSH: CPT

## 2020-11-13 PROCEDURE — 93971 EXTREMITY STUDY: CPT | Mod: 26,LT

## 2020-11-13 PROCEDURE — 99284 EMERGENCY DEPT VISIT MOD MDM: CPT

## 2020-11-13 PROCEDURE — 96375 TX/PRO/DX INJ NEW DRUG ADDON: CPT

## 2020-11-13 RX ORDER — VALACYCLOVIR 500 MG/1
1 TABLET, FILM COATED ORAL
Qty: 21 | Refills: 0
Start: 2020-11-13 | End: 2020-11-19

## 2020-11-13 RX ORDER — CEPHALEXIN 500 MG
1 CAPSULE ORAL
Qty: 28 | Refills: 0
Start: 2020-11-13 | End: 2020-11-19

## 2020-11-13 RX ORDER — VALACYCLOVIR 500 MG/1
1000 TABLET, FILM COATED ORAL ONCE
Refills: 0 | Status: COMPLETED | OUTPATIENT
Start: 2020-11-13 | End: 2020-11-13

## 2020-11-13 RX ORDER — CEFTRIAXONE 500 MG/1
1000 INJECTION, POWDER, FOR SOLUTION INTRAMUSCULAR; INTRAVENOUS ONCE
Refills: 0 | Status: COMPLETED | OUTPATIENT
Start: 2020-11-13 | End: 2020-11-13

## 2020-11-13 RX ORDER — KETOROLAC TROMETHAMINE 30 MG/ML
30 SYRINGE (ML) INJECTION ONCE
Refills: 0 | Status: DISCONTINUED | OUTPATIENT
Start: 2020-11-13 | End: 2020-11-13

## 2020-11-13 RX ADMIN — Medication 30 MILLIGRAM(S): at 21:23

## 2020-11-13 RX ADMIN — VALACYCLOVIR 1000 MILLIGRAM(S): 500 TABLET, FILM COATED ORAL at 21:48

## 2020-11-13 RX ADMIN — CEFTRIAXONE 1000 MILLIGRAM(S): 500 INJECTION, POWDER, FOR SOLUTION INTRAMUSCULAR; INTRAVENOUS at 21:23

## 2020-11-13 NOTE — ED STATDOCS - PROGRESS NOTE DETAILS
signed Alejandra Dyson PA-C Pt initially seen and examined by Dr. Cuevas in intake.   50F with painful red rash to medial aspect of knee, as well as distally in discrete circular macular lesions. Some smaller lesions are raised, not vesicular appearing at this time but to bring to mind shingles as a possible dx. Pt notes pain in her whole leg. No significant findings on labwork. Sono notes incidental bakers cyst. Discussed with pt, lesions may be shingles, though cannot confirm that at this time, will start valtrex as well as keflex for possible cellulitis. Pt will full painless ROM left knee. pt notes she has pain meds at home. recommend outpt f/u PMD or derm. return precautions given. Pt feeling well at DC, agrees with DC and plan of care.

## 2020-11-13 NOTE — ED STATDOCS - SKIN, MLM
skin normal color for race, warm, dry. +visible erythema to L anterior knee. +moderate TTP to anterior shin.

## 2020-11-13 NOTE — ED STATDOCS - CARE PLAN
Principal Discharge DX:	Rash and nonspecific skin eruption  Secondary Diagnosis:	Pain of left lower extremity

## 2020-11-13 NOTE — ED STATDOCS - MUSCULOSKELETAL, MLM
range of motion is not limited. +L knee with mild swelling anteriorly, +visible erythema to L anterior knee. +moderate TTP to anterior shin.

## 2020-11-13 NOTE — ED ADULT NURSE NOTE - OBJECTIVE STATEMENT
49 y/o F presents to the ED c/o left knee and leg pain. Pt states leg has become red and inflamed with difficulty ambulating. Pain is 10/10/

## 2020-11-13 NOTE — ED ADULT TRIAGE NOTE - CHIEF COMPLAINT QUOTE
Pt comes to the ED complaining of left leg and knee pain. Pt states that it goes from her hip all the way down her leg. Pt denies any injury.

## 2020-11-13 NOTE — ED STATDOCS - ATTENDING CONTRIBUTION TO CARE
I, Juanjo Cuevas, performed the initial face to face bedside interview with this patient regarding history of present illness, review of symptoms and relevant past medical, social and family history.  I completed an independent physical examination.  I was the initial provider who evaluated this patient. I have signed out the follow up of any pending tests (i.e. labs, radiological studies) to the ACP.  I have communicated the patient’s plan of care and disposition with the ACP.  The history, relevant review of systems, past medical and surgical history, medical decision making, and physical examination was documented by the scribe in my presence and I attest to the accuracy of the documentation.     pt with multiple medical history in ED c/o left knee pain x 3 days.   no trauma.   anxious appearing.   mild TTP of left knee with visible rash and erythema anterior left knee.   mild swelling noted.   will check US, labs, meds and reeval possible DVT vs cellulitis.

## 2020-11-13 NOTE — ED STATDOCS - NSFOLLOWUPINSTRUCTIONS_ED_ALL_ED_FT
Shingles    WHAT YOU NEED TO KNOW:    Shingles is a painful rash. Shingles is caused by the same virus that causes chickenpox (varicella-zoster). After you get chickenpox, the virus stays in your body for several years without causing any symptoms. Shingles occurs when the virus becomes active again. The active virus travels along a nerve to your skin and causes a rash.    DISCHARGE INSTRUCTIONS:    Call your local emergency number (911 in the ) if:   •You have trouble moving your arms, legs, or face.      •You become confused, or have difficulty speaking.      •You have a seizure.      Seek care immediately if:   •You have weakness in an arm or leg.      •You have dizziness, a severe headache, or hearing or vision loss.      •You have painful, red, warm skin around the blisters, or the blisters drain pus.      •Your neck is stiff or you have trouble moving it.      Call your doctor if:   •You feel weak or have a headache.      •You have a cough, chills, or a fever.      •You have abdominal pain or nausea, or you are vomiting.      •Your rash becomes more itchy or painful.      •Your rash spreads to other parts of your body.      •Your pain worsens and does not go away even after you take medicine.      •You have questions or concerns about your condition or care.      Medicines: You may need any of the following:  •Antiviral medicine helps decrease symptoms and healing time. It may also decrease your risk for nerve pain. You will need to start taking this medicine within 3 days of the start of symptoms to prevent nerve pain.      •Prescription pain medicine may be given. Ask your healthcare provider how to take this medicine safely. Some prescription pain medicines contain acetaminophen. Do not take other medicines that contain acetaminophen without talking to your healthcare provider. Too much acetaminophen may cause liver damage. Prescription pain medicine may cause constipation. Ask your healthcare provider how to prevent or treat constipation.       •Acetaminophen decreases pain and fever. It is available without a doctor's order. Ask how much to take and how often to take it. Follow directions. Read the labels of all other medicines you are using to see if they also contain acetaminophen, or ask your doctor or pharmacist. Acetaminophen can cause liver damage if not taken correctly. Do not use more than 4 grams (4,000 milligrams) total of acetaminophen in one day.       •NSAIDs, such as ibuprofen, help decrease swelling, pain, and fever. This medicine is available with or without a doctor's order. NSAIDs can cause stomach bleeding or kidney problems in certain people. If you take blood thinner medicine, always ask if NSAIDs are safe for you. Always read the medicine label and follow directions. Do not give these medicines to children under 6 months of age without direction from your child's healthcare provider.      •Topical anesthetics are used to numb the skin and decrease pain. They can be a cream, gel, spray, or patch.       •Anticonvulsants decrease nerve pain and may help you sleep at night.      •Antidepressants may be used to decrease nerve pain.      •Take your medicine as directed. Contact your healthcare provider if you think your medicine is not helping or if you have side effects. Tell him or her if you are allergic to any medicine. Keep a list of the medicines, vitamins, and herbs you take. Include the amounts, and when and why you take them. Bring the list or the pill bottles to follow-up visits. Carry your medicine list with you in case of an emergency.      Self-care: Keep your rash clean and dry. Cover your rash with a bandage or clothing. Do not use bandages that stick to your skin. The sticky part may irritate your skin and make your rash last longer.    Prevent the spread of the germs:          •Wash your hands often. Wash your hands several times each day. Wash after you use the bathroom, change a child's diaper, and before you prepare or eat food. Use soap and water every time. Rub your soapy hands together, lacing your fingers. Wash the front and back of your hands, and in between your fingers. Use the fingers of one hand to scrub under the fingernails of the other hand. Wash for at least 20 seconds. Rinse with warm, running water for several seconds. Then dry your hands with a clean towel or paper towel. Use hand  that contains alcohol if soap and water are not available. Do not touch your eyes, nose, or mouth without washing your hands first.  Handwashing           •Cover a sneeze or cough. Use a tissue that covers your mouth and nose. Throw the tissue away in a trash can right away. Use the bend of your arm if a tissue is not available. Wash your hands well with soap and water or use a hand .      •Stay away from others while you are sick. Avoid crowds as much as possible.      •Ask about vaccines you may need. Talk to your healthcare provider about your vaccine history. He or she will tell you which vaccines you need, and when to get them.      Prevent shingles or another shingles outbreak: A vaccine may be given to help prevent shingles. You can get the vaccine even if you already had shingles. The vaccine can help prevent a future outbreak. If you do get shingles again, the vaccine can keep it from becoming severe. The vaccine comes in 2 forms. Your healthcare provider will tell you which form is right for you. The decision is based on your age and any medical conditions you have. A 2-dose vaccine is usually given to adults 50 years or older. A 1-dose vaccine may be given to adults 60 years or older.    For more information:   •Centers for Disease Control and Prevention  [address], [place]  Phone: [phone]  Phone: 6-857-9969671  Web Address: http://www.cdc.gov        Follow up with your doctor as directed: Write down your questions so you remember to ask them during your visits.     FOLLOW UP WITH YOUR PRIMARY DOCTOR OR DERMATOLOGIST IN 1-2 DAYS. RETURN TO THE ER FOR ANY WORSENING SYMPTOMS OR NEW CONCERNS.

## 2020-11-13 NOTE — ED STATDOCS - OBJECTIVE STATEMENT
51 y/o F with PMHx of anxiety, arthritis, cyclical vomiting, renal stones presenting to the ED c/o worsening L knee pain  this PM. Patient reports she has been having left knee pain x3 days, noted increased pain after putting on knee brace and after she took it off she found to have red markings to L knee followed by L knee swelling. Took her normal pain medications without relief. Denies any other symptoms/complaints at this time. 49 y/o F with PMHx of anxiety, arthritis, cyclical vomiting, renal stones presenting to the ED c/o worsening L knee pain  this PM. Patient reports she has been having left knee pain x3 days, noted increased pain after putting on knee brace and after she took it off she noted red markings to L knee followed by L knee swelling.  pt states that she took her normal pain medications from her pain management MD without relief. Denies any other symptoms/complaints at this time.

## 2020-11-13 NOTE — ED STATDOCS - PATIENT PORTAL LINK FT
You can access the FollowMyHealth Patient Portal offered by [facility] by registering at the following website: [email or web address]. By joining [email or web address]’s FollowMyHealth portal, you will also be able to view your health information using other applications (apps) compatible with our system.

## 2020-11-13 NOTE — ED STATDOCS - PMH
Anxiety    Arthritis    Breast mass    Cyclical vomiting    Fibromyalgia    History of rib fracture  right  Renal stone  left

## 2021-05-14 NOTE — DISCHARGE NOTE NURSING/CASE MANAGEMENT/SOCIAL WORK - NSDCDPATPORTLINK_GEN_ALL_CORE
[employer] Assistance Form refaxed with patients ID# [id number] listed on it to [phone].   You can access the [facility] Portal, offered by [facility], by registering with the following website: [email or web address]

## 2021-06-10 NOTE — ED STATDOCS - ADMIT DISPOSITION PRESENT ON ADMISSION SEPSIS Q1 - RE-EVALUATED PATIENT FLUID AND VITAL SIGNS
Problem: NEUROSENSORY - ADULT  Goal: Achieves stable or improved neurological status  Description: INTERVENTIONS  - Monitor and report changes in neurological status  - Monitor vital signs such as temperature, blood pressure, glucose, and any other labs ordered   - Initiate measures to prevent increased intracranial pressure  - Monitor for seizure activity and implement precautions if appropriate      Outcome: Progressing  Goal: Remains free of injury related to seizures activity  Description: INTERVENTIONS  - Maintain airway, patient safety  and administer oxygen as ordered  - Monitor patient for seizure activity, document and report duration and description of seizure to physician/advanced practitioner  - If seizure occurs,  ensure patient safety during seizure  - Reorient patient post seizure  - Seizure pads on all 4 side rails  - Instruct patient/family to notify RN of any seizure activity including if an aura is experienced  - Instruct patient/family to call for assistance with activity based on nursing assessment  - Administer anti-seizure medications if ordered    Outcome: Progressing  Goal: Achieves maximal functionality and self care  Description: INTERVENTIONS  - Monitor swallowing and airway patency with patient fatigue and changes in neurological status  - Encourage and assist patient to increase activity and self care     - Encourage visually impaired, hearing impaired and aphasic patients to use assistive/communication devices  Outcome: Progressing     Problem: CARDIOVASCULAR - ADULT  Goal: Maintains optimal cardiac output and hemodynamic stability  Description: INTERVENTIONS:  - Monitor I/O, vital signs and rhythm  - Monitor for S/S and trends of decreased cardiac output  - Administer and titrate ordered vasoactive medications to optimize hemodynamic stability  - Assess quality of pulses, skin color and temperature  - Assess for signs of decreased coronary artery perfusion  - Instruct patient to report change in severity of symptoms  Outcome: Progressing  Goal: Absence of cardiac dysrhythmias or at baseline rhythm  Description: INTERVENTIONS:  - Continuous cardiac monitoring, vital signs, obtain 12 lead EKG if ordered  - Administer antiarrhythmic and heart rate control medications as ordered  - Monitor electrolytes and administer replacement therapy as ordered  Outcome: Progressing     Problem: RESPIRATORY - ADULT  Goal: Achieves optimal ventilation and oxygenation  Description: INTERVENTIONS:  - Assess for changes in respiratory status  - Assess for changes in mentation and behavior  - Position to facilitate oxygenation and minimize respiratory effort  - Oxygen administered by appropriate delivery if ordered  - Initiate smoking cessation education as indicated  - Encourage broncho-pulmonary hygiene including cough, deep breathe, Incentive Spirometry  - Assess the need for suctioning and aspirate as needed  - Assess and instruct to report SOB or any respiratory difficulty  - Respiratory Therapy support as indicated  Outcome: Progressing     Problem: GASTROINTESTINAL - ADULT  Goal: Minimal or absence of nausea and/or vomiting  Description: INTERVENTIONS:  - Administer IV fluids if ordered to ensure adequate hydration  - Maintain NPO status until nausea and vomiting are resolved  - Nasogastric tube if ordered  - Administer ordered antiemetic medications as needed  - Provide nonpharmacologic comfort measures as appropriate  - Advance diet as tolerated, if ordered  - Consider nutrition services referral to assist patient with adequate nutrition and appropriate food choices  Outcome: Progressing  Goal: Maintains or returns to baseline bowel function  Description: INTERVENTIONS:  - Assess bowel function  - Encourage oral fluids to ensure adequate hydration  - Administer IV fluids if ordered to ensure adequate hydration  - Administer ordered medications as needed  - Encourage mobilization and activity  - Consider nutritional services referral to assist patient with adequate nutrition and appropriate food choices  Outcome: Progressing  Goal: Maintains adequate nutritional intake  Description: INTERVENTIONS:  - Monitor percentage of each meal consumed  - Identify factors contributing to decreased intake, treat as appropriate  - Assist with meals as needed  - Monitor I&O, weight, and lab values if indicated  - Obtain nutrition services referral as needed  Outcome: Progressing  Goal: Establish and maintain optimal ostomy function  Description: INTERVENTIONS:  - Assess bowel function  - Encourage oral fluids to ensure adequate hydration  - Administer IV fluids if ordered to ensure adequate hydration   - Administer ordered medications as needed  - Encourage mobilization and activity  - Nutrition services referral to assist patient with appropriate food choices  - Assess stoma site  - Consider wound care consult   Outcome: Progressing     Problem: GENITOURINARY - ADULT  Goal: Maintains or returns to baseline urinary function  Description: INTERVENTIONS:  - Assess urinary function  - Encourage oral fluids to ensure adequate hydration if ordered  - Administer IV fluids as ordered to ensure adequate hydration  - Administer ordered medications as needed  - Offer frequent toileting  - Follow urinary retention protocol if ordered  Outcome: Progressing  Goal: Absence of urinary retention  Description: INTERVENTIONS:  - Assess patients ability to void and empty bladder  - Monitor I/O  - Bladder scan as needed  - Discuss with physician/AP medications to alleviate retention as needed  - Discuss catheterization for long term situations as appropriate  Outcome: Progressing  Goal: Urinary catheter remains patent  Description: INTERVENTIONS:  - Assess patency of urinary catheter  - If patient has a chronic joseph, consider changing catheter if non-functioning  - Follow guidelines for intermittent irrigation of non-functioning urinary catheter  Outcome: Progressing     Problem: METABOLIC, FLUID AND ELECTROLYTES - ADULT  Goal: Electrolytes maintained within normal limits  Description: INTERVENTIONS:  - Monitor labs and assess patient for signs and symptoms of electrolyte imbalances  - Administer electrolyte replacement as ordered  - Monitor response to electrolyte replacements, including repeat lab results as appropriate  - Instruct patient on fluid and nutrition as appropriate  Outcome: Progressing  Goal: Fluid balance maintained  Description: INTERVENTIONS:  - Monitor labs   - Monitor I/O and WT  - Instruct patient on fluid and nutrition as appropriate  - Assess for signs & symptoms of volume excess or deficit  Outcome: Progressing  Goal: Glucose maintained within target range  Description: INTERVENTIONS:  - Monitor Blood Glucose as ordered  - Assess for signs and symptoms of hyperglycemia and hypoglycemia  - Administer ordered medications to maintain glucose within target range  - Assess nutritional intake and initiate nutrition service referral as needed  Outcome: Progressing     Problem: HEMATOLOGIC - ADULT  Goal: Maintains hematologic stability  Description: INTERVENTIONS  - Assess for signs and symptoms of bleeding or hemorrhage  - Monitor labs  - Administer supportive blood products/factors as ordered and appropriate  Outcome: Progressing     Problem: MUSCULOSKELETAL - ADULT  Goal: Maintain or return mobility to safest level of function  Description: INTERVENTIONS:  - Assess patient's ability to carry out ADLs; assess patient's baseline for ADL function and identify physical deficits which impact ability to perform ADLs (bathing, care of mouth/teeth, toileting, grooming, dressing, etc )  - Assess/evaluate cause of self-care deficits   - Assess range of motion  - Assess patient's mobility  - Assess patient's need for assistive devices and provide as appropriate  - Encourage maximum independence but intervene and supervise when necessary  - Involve family in performance of ADLs  - Assess for home care needs following discharge   - Consider OT consult to assist with ADL evaluation and planning for discharge  - Provide patient education as appropriate  Outcome: Progressing  Goal: Maintain proper alignment of affected body part  Description: INTERVENTIONS:  - Support, maintain and protect limb and body alignment  - Provide patient/ family with appropriate education  Outcome: Progressing I have re-evaluated the patient's fluid status and reviewed vital signs. Clinical perfusion assessment was performed.

## 2021-07-08 ENCOUNTER — EMERGENCY (EMERGENCY)
Facility: HOSPITAL | Age: 51
LOS: 0 days | Discharge: HOME | End: 2021-07-08
Attending: EMERGENCY MEDICINE
Payer: COMMERCIAL

## 2021-07-08 VITALS
HEART RATE: 87 BPM | OXYGEN SATURATION: 98 % | TEMPERATURE: 98 F | RESPIRATION RATE: 18 BRPM | DIASTOLIC BLOOD PRESSURE: 63 MMHG | SYSTOLIC BLOOD PRESSURE: 130 MMHG | HEIGHT: 62 IN | WEIGHT: 220.02 LBS

## 2021-07-08 DIAGNOSIS — M19.90 UNSPECIFIED OSTEOARTHRITIS, UNSPECIFIED SITE: ICD-10-CM

## 2021-07-08 DIAGNOSIS — Z90.710 ACQUIRED ABSENCE OF BOTH CERVIX AND UTERUS: ICD-10-CM

## 2021-07-08 DIAGNOSIS — Z90.710 ACQUIRED ABSENCE OF BOTH CERVIX AND UTERUS: Chronic | ICD-10-CM

## 2021-07-08 DIAGNOSIS — Z87.442 PERSONAL HISTORY OF URINARY CALCULI: ICD-10-CM

## 2021-07-08 DIAGNOSIS — G56.01 CARPAL TUNNEL SYNDROME, RIGHT UPPER LIMB: Chronic | ICD-10-CM

## 2021-07-08 DIAGNOSIS — M25.562 PAIN IN LEFT KNEE: ICD-10-CM

## 2021-07-08 DIAGNOSIS — M25.462 EFFUSION, LEFT KNEE: ICD-10-CM

## 2021-07-08 DIAGNOSIS — Z98.891 HISTORY OF UTERINE SCAR FROM PREVIOUS SURGERY: Chronic | ICD-10-CM

## 2021-07-08 DIAGNOSIS — G89.29 OTHER CHRONIC PAIN: ICD-10-CM

## 2021-07-08 DIAGNOSIS — Z79.899 OTHER LONG TERM (CURRENT) DRUG THERAPY: ICD-10-CM

## 2021-07-08 DIAGNOSIS — Z90.49 ACQUIRED ABSENCE OF OTHER SPECIFIED PARTS OF DIGESTIVE TRACT: Chronic | ICD-10-CM

## 2021-07-08 DIAGNOSIS — N60.09 SOLITARY CYST OF UNSPECIFIED BREAST: Chronic | ICD-10-CM

## 2021-07-08 DIAGNOSIS — Z98.1 ARTHRODESIS STATUS: ICD-10-CM

## 2021-07-08 DIAGNOSIS — Z98.1 ARTHRODESIS STATUS: Chronic | ICD-10-CM

## 2021-07-08 DIAGNOSIS — F41.9 ANXIETY DISORDER, UNSPECIFIED: ICD-10-CM

## 2021-07-08 DIAGNOSIS — Z88.5 ALLERGY STATUS TO NARCOTIC AGENT: ICD-10-CM

## 2021-07-08 DIAGNOSIS — M79.7 FIBROMYALGIA: ICD-10-CM

## 2021-07-08 DIAGNOSIS — G56.02 CARPAL TUNNEL SYNDROME, LEFT UPPER LIMB: Chronic | ICD-10-CM

## 2021-07-08 DIAGNOSIS — Z90.49 ACQUIRED ABSENCE OF OTHER SPECIFIED PARTS OF DIGESTIVE TRACT: ICD-10-CM

## 2021-07-08 DIAGNOSIS — Z98.890 OTHER SPECIFIED POSTPROCEDURAL STATES: Chronic | ICD-10-CM

## 2021-07-08 PROCEDURE — 99283 EMERGENCY DEPT VISIT LOW MDM: CPT

## 2021-07-08 NOTE — ED STATDOCS - CLINICAL SUMMARY MEDICAL DECISION MAKING FREE TEXT BOX
Try steroid taper and follow-up with Dr. Pope, ortho acute on chronic knee pain, follow up as an outpt.  treated with steroid

## 2021-07-08 NOTE — ED STATDOCS - OBJECTIVE STATEMENT
49 y/o female with PMHx of arthritis presents to the ED c/o left knee pain and swelling. Pt states she is taking 4 200mg of Ibuprofen for her pain. Pt states she follows with ortho, but states the swelling has increased and is asking for it to be drained. States she has had worsening pain over the past 4 days.  Ortho: Dr. Pope

## 2021-07-08 NOTE — ED STATDOCS - ATTENDING CONTRIBUTION TO CARE
I, Angela Johnson MD,  performed the initial face to face bedside interview with this patient regarding history of present illness, review of symptoms and relevant past medical, social and family history.  I completed an independent physical examination.  I was the initial provider who evaluated this patient. I have signed out the follow up of any pending tests (i.e. labs, radiological studies) to the ACP.  I have communicated the patient’s plan of care and disposition with the ACP.  The history, relevant review of systems, past medical and surgical history, medical decision making, and physical examination was documented by the scribe in my presence and I attest to the accuracy of the documentation.

## 2021-07-08 NOTE — ED ADULT TRIAGE NOTE - CHIEF COMPLAINT QUOTE
Left knee swelling and pain. Pt has been having fluid collecting in her left knee for past 10 days. Pt is having difficulty walking, and bending her left knee. Took oxycontin with partial relief.

## 2021-07-08 NOTE — ED STATDOCS - PATIENT PORTAL LINK FT
You can access the FollowMyHealth Patient Portal offered by [facility] by registering at the following website: [email or web address]. By joining [email or web address]’s FollowMyHealth portal, you will also be able to view your health information using other applications (apps) compatible with our system.

## 2021-07-08 NOTE — ED STATDOCS - NSFOLLOWUPINSTRUCTIONS_ED_ALL_ED_FT
Follow up with your orthopedist for further evaluation of your pain.  Take the prednisone on top of the other medication for pain.    Return to the ER for any new or other concerns.       Knee Pain    WHAT YOU NEED TO KNOW:    Knee pain may start suddenly, or it may be a long-term problem. You may have pain on the side, front, or back of your knee. You may have knee stiffness and swelling. You may hear popping sounds or feel like your knee is giving way or locking up as you walk. You may feel pain when you sit, stand, walk, or climb up and down stairs. Knee pain can be caused by conditions such as obesity, inflammation, or strains or tears in ligaments or tendons.     DISCHARGE INSTRUCTIONS:    Return to the emergency department if:   •Your pain is worse, even after treatment.       •You cannot bend or straighten your leg completely.       •The swelling around your knee does not go down even with treatment.      •Your knee is painful and hot to the touch.       Contact your healthcare provider if:   •You have questions or concerns about your condition or care.           Medicines: You may need any of the following:   •NSAIDs help decrease swelling and pain or fever. This medicine is available with or without a doctor's order. NSAIDs can cause stomach bleeding or kidney problems in certain people. If you take blood thinner medicine, always ask your healthcare provider if NSAIDs are safe for you. Always read the medicine label and follow directions.      •Acetaminophen decreases pain and fever. It is available without a doctor's order. Ask how much to take and how often to take it. Follow directions. Read the labels of all other medicines you are using to see if they also contain acetaminophen, or ask your doctor or pharmacist. Acetaminophen can cause liver damage if not taken correctly. Do not use more than 4 grams (4,000 milligrams) total of acetaminophen in one day.       •Prescription pain medicine may be given. Ask your healthcare provider how to take this medicine safely. Some prescription pain medicines contain acetaminophen. Do not take other medicines that contain acetaminophen without talking to your healthcare provider. Too much acetaminophen may cause liver damage. Prescription pain medicine may cause constipation. Ask your healthcare provider how to prevent or treat constipation.       •Take your medicine as directed. Contact your healthcare provider if you think your medicine is not helping or if you have side effects. Tell him or her if you are allergic to any medicine. Keep a list of the medicines, vitamins, and herbs you take. Include the amounts, and when and why you take them. Bring the list or the pill bottles to follow-up visits. Carry your medicine list with you in case of an emergency.      What you can do to manage your symptoms:   •Rest your knee so it can heal. Limit activities that increase your pain. Do low-impact exercises, such as walking or swimming.       •Apply ice to help reduce swelling and pain. Use an ice pack, or put crushed ice in a plastic bag. Cover it with a towel before you apply it to your knee. Apply ice for 15 to 20 minutes every hour, or as directed.      •Apply compression to help reduce swelling. Use a brace or bandage only as directed.      •Elevate your knee to help decrease pain and swelling. Elevate your knee while you are sitting or lying down. Prop your leg on pillows to keep your knee above the level of your heart.      •Prevent your knee from moving as directed. Your healthcare provider may put on a cast or splint. You may need to wear a leg brace to stabilize your knee. A leg brace can be adjusted to increase your range of motion as your knee heals.  Hinged Knee Braces            What you can do to prevent knee pain:   •Maintain a healthy weight. Extra weight increases your risk for knee pain. Ask your healthcare provider how much you should weigh. He or she can help you create a safe weight loss plan if you need to lose weight.      •Exercise or train properly. Use the correct equipment for sports. Wear shoes that provide good support. Check your posture often as you exercise, play sports, or train for an event. This can help prevent stress and strain on your knees. Rest between sessions so you do not overwork your knees.      Follow up with your healthcare provider within 24 hours or as directed: You may need follow-up treatments, such as steroid injections to decrease pain. Write down your questions so you remember to ask them during your visits.

## 2021-07-08 NOTE — ED STATDOCS - PROGRESS NOTE DETAILS
51 yo female with a Pmh of arthritis , presents with L knee pain. Pt has been dealing with pain for a long period of time and saw her orthopedist who scheduled for MRI but needs to get approval. Pt has been taking oxycontin 15mg, morphine 4 mg, and 800mg ibuprofen. Pt asked for MRI and to tap the L knee. Advised we cannot order the JOHN here (it would need to be outpt) and orthopedist normally would not tap the knee in the ER, that it would be done in the office. SInce pt is on meds, will prescribe prednisone and have pt f/u with ortho. Cane was also give to the pt. -John Pruett PA-C

## 2021-07-20 NOTE — ED ADULT NURSE REASSESSMENT NOTE - NS ED NURSE REASSESS COMMENT FT1
Pt's lac repaired by Dr. Piedra at this time. Pt tolerated the procedure well. Will follow up. - - -

## 2021-08-16 ENCOUNTER — EMERGENCY (EMERGENCY)
Facility: HOSPITAL | Age: 51
LOS: 0 days | Discharge: HOME | End: 2021-08-17
Attending: EMERGENCY MEDICINE
Payer: COMMERCIAL

## 2021-08-16 VITALS
OXYGEN SATURATION: 99 % | HEART RATE: 63 BPM | SYSTOLIC BLOOD PRESSURE: 149 MMHG | DIASTOLIC BLOOD PRESSURE: 62 MMHG | TEMPERATURE: 99 F

## 2021-08-16 VITALS
RESPIRATION RATE: 20 BRPM | OXYGEN SATURATION: 95 % | TEMPERATURE: 98 F | HEART RATE: 91 BPM | DIASTOLIC BLOOD PRESSURE: 74 MMHG | SYSTOLIC BLOOD PRESSURE: 141 MMHG

## 2021-08-16 DIAGNOSIS — Z87.39 PERSONAL HISTORY OF OTHER DISEASES OF THE MUSCULOSKELETAL SYSTEM AND CONNECTIVE TISSUE: ICD-10-CM

## 2021-08-16 DIAGNOSIS — Z98.891 HISTORY OF UTERINE SCAR FROM PREVIOUS SURGERY: Chronic | ICD-10-CM

## 2021-08-16 DIAGNOSIS — Z98.1 ARTHRODESIS STATUS: Chronic | ICD-10-CM

## 2021-08-16 DIAGNOSIS — G56.02 CARPAL TUNNEL SYNDROME, LEFT UPPER LIMB: Chronic | ICD-10-CM

## 2021-08-16 DIAGNOSIS — K52.9 NONINFECTIVE GASTROENTERITIS AND COLITIS, UNSPECIFIED: ICD-10-CM

## 2021-08-16 DIAGNOSIS — R19.7 DIARRHEA, UNSPECIFIED: ICD-10-CM

## 2021-08-16 DIAGNOSIS — Z79.899 OTHER LONG TERM (CURRENT) DRUG THERAPY: ICD-10-CM

## 2021-08-16 DIAGNOSIS — Z90.710 ACQUIRED ABSENCE OF BOTH CERVIX AND UTERUS: Chronic | ICD-10-CM

## 2021-08-16 DIAGNOSIS — N60.09 SOLITARY CYST OF UNSPECIFIED BREAST: Chronic | ICD-10-CM

## 2021-08-16 DIAGNOSIS — Z90.49 ACQUIRED ABSENCE OF OTHER SPECIFIED PARTS OF DIGESTIVE TRACT: ICD-10-CM

## 2021-08-16 DIAGNOSIS — Z87.442 PERSONAL HISTORY OF URINARY CALCULI: ICD-10-CM

## 2021-08-16 DIAGNOSIS — R11.2 NAUSEA WITH VOMITING, UNSPECIFIED: ICD-10-CM

## 2021-08-16 DIAGNOSIS — Z86.59 PERSONAL HISTORY OF OTHER MENTAL AND BEHAVIORAL DISORDERS: ICD-10-CM

## 2021-08-16 DIAGNOSIS — Z88.6 ALLERGY STATUS TO ANALGESIC AGENT: ICD-10-CM

## 2021-08-16 DIAGNOSIS — G56.01 CARPAL TUNNEL SYNDROME, RIGHT UPPER LIMB: Chronic | ICD-10-CM

## 2021-08-16 DIAGNOSIS — Z90.49 ACQUIRED ABSENCE OF OTHER SPECIFIED PARTS OF DIGESTIVE TRACT: Chronic | ICD-10-CM

## 2021-08-16 DIAGNOSIS — R68.83 CHILLS (WITHOUT FEVER): ICD-10-CM

## 2021-08-16 DIAGNOSIS — R10.9 UNSPECIFIED ABDOMINAL PAIN: ICD-10-CM

## 2021-08-16 DIAGNOSIS — M79.7 FIBROMYALGIA: ICD-10-CM

## 2021-08-16 DIAGNOSIS — M19.90 UNSPECIFIED OSTEOARTHRITIS, UNSPECIFIED SITE: ICD-10-CM

## 2021-08-16 DIAGNOSIS — Z90.710 ACQUIRED ABSENCE OF BOTH CERVIX AND UTERUS: ICD-10-CM

## 2021-08-16 DIAGNOSIS — Z98.890 OTHER SPECIFIED POSTPROCEDURAL STATES: Chronic | ICD-10-CM

## 2021-08-16 DIAGNOSIS — Z79.891 LONG TERM (CURRENT) USE OF OPIATE ANALGESIC: ICD-10-CM

## 2021-08-16 LAB
ALBUMIN SERPL ELPH-MCNC: 4.3 G/DL — SIGNIFICANT CHANGE UP (ref 3.3–5)
ALP SERPL-CCNC: 88 U/L — SIGNIFICANT CHANGE UP (ref 40–120)
ALT FLD-CCNC: 22 U/L — SIGNIFICANT CHANGE UP (ref 12–78)
ANION GAP SERPL CALC-SCNC: 6 MMOL/L — SIGNIFICANT CHANGE UP (ref 5–17)
APPEARANCE UR: ABNORMAL
APTT BLD: 32.8 SEC — SIGNIFICANT CHANGE UP (ref 27.5–35.5)
AST SERPL-CCNC: 13 U/L — LOW (ref 15–37)
BASOPHILS # BLD AUTO: 0.05 K/UL — SIGNIFICANT CHANGE UP (ref 0–0.2)
BASOPHILS NFR BLD AUTO: 0.3 % — SIGNIFICANT CHANGE UP (ref 0–2)
BILIRUB SERPL-MCNC: 0.5 MG/DL — SIGNIFICANT CHANGE UP (ref 0.2–1.2)
BILIRUB UR-MCNC: NEGATIVE — SIGNIFICANT CHANGE UP
BUN SERPL-MCNC: 11 MG/DL — SIGNIFICANT CHANGE UP (ref 7–23)
CALCIUM SERPL-MCNC: 9.7 MG/DL — SIGNIFICANT CHANGE UP (ref 8.5–10.1)
CHLORIDE SERPL-SCNC: 107 MMOL/L — SIGNIFICANT CHANGE UP (ref 96–108)
CO2 SERPL-SCNC: 28 MMOL/L — SIGNIFICANT CHANGE UP (ref 22–31)
COLOR SPEC: YELLOW — SIGNIFICANT CHANGE UP
CREAT SERPL-MCNC: 0.64 MG/DL — SIGNIFICANT CHANGE UP (ref 0.5–1.3)
DIFF PNL FLD: ABNORMAL
EOSINOPHIL # BLD AUTO: 0 K/UL — SIGNIFICANT CHANGE UP (ref 0–0.5)
EOSINOPHIL NFR BLD AUTO: 0 % — SIGNIFICANT CHANGE UP (ref 0–6)
GLUCOSE SERPL-MCNC: 117 MG/DL — HIGH (ref 70–99)
GLUCOSE UR QL: NEGATIVE MG/DL — SIGNIFICANT CHANGE UP
HCG SERPL-ACNC: 2 MIU/ML — SIGNIFICANT CHANGE UP
HCT VFR BLD CALC: 45.8 % — HIGH (ref 34.5–45)
HGB BLD-MCNC: 15.1 G/DL — SIGNIFICANT CHANGE UP (ref 11.5–15.5)
IMM GRANULOCYTES NFR BLD AUTO: 0.9 % — SIGNIFICANT CHANGE UP (ref 0–1.5)
INR BLD: 1.14 RATIO — SIGNIFICANT CHANGE UP (ref 0.88–1.16)
KETONES UR-MCNC: ABNORMAL
LACTATE SERPL-SCNC: 1.2 MMOL/L — SIGNIFICANT CHANGE UP (ref 0.7–2)
LEUKOCYTE ESTERASE UR-ACNC: NEGATIVE — SIGNIFICANT CHANGE UP
LYMPHOCYTES # BLD AUTO: 10.6 % — LOW (ref 13–44)
LYMPHOCYTES # BLD AUTO: 2.11 K/UL — SIGNIFICANT CHANGE UP (ref 1–3.3)
MCHC RBC-ENTMCNC: 28.2 PG — SIGNIFICANT CHANGE UP (ref 27–34)
MCHC RBC-ENTMCNC: 33 GM/DL — SIGNIFICANT CHANGE UP (ref 32–36)
MCV RBC AUTO: 85.6 FL — SIGNIFICANT CHANGE UP (ref 80–100)
MONOCYTES # BLD AUTO: 0.94 K/UL — HIGH (ref 0–0.9)
MONOCYTES NFR BLD AUTO: 4.7 % — SIGNIFICANT CHANGE UP (ref 2–14)
NEUTROPHILS # BLD AUTO: 16.57 K/UL — HIGH (ref 1.8–7.4)
NEUTROPHILS NFR BLD AUTO: 83.5 % — HIGH (ref 43–77)
NITRITE UR-MCNC: NEGATIVE — SIGNIFICANT CHANGE UP
PH UR: 6 — SIGNIFICANT CHANGE UP (ref 5–8)
PLATELET # BLD AUTO: 435 K/UL — HIGH (ref 150–400)
POTASSIUM SERPL-MCNC: 3.2 MMOL/L — LOW (ref 3.5–5.3)
POTASSIUM SERPL-SCNC: 3.2 MMOL/L — LOW (ref 3.5–5.3)
PROT SERPL-MCNC: 8.3 GM/DL — SIGNIFICANT CHANGE UP (ref 6–8.3)
PROT UR-MCNC: 30 MG/DL
PROTHROM AB SERPL-ACNC: 13.2 SEC — SIGNIFICANT CHANGE UP (ref 10.6–13.6)
RBC # BLD: 5.35 M/UL — HIGH (ref 3.8–5.2)
RBC # FLD: 14 % — SIGNIFICANT CHANGE UP (ref 10.3–14.5)
SODIUM SERPL-SCNC: 141 MMOL/L — SIGNIFICANT CHANGE UP (ref 135–145)
SP GR SPEC: 1.01 — SIGNIFICANT CHANGE UP (ref 1.01–1.02)
UROBILINOGEN FLD QL: NEGATIVE MG/DL — SIGNIFICANT CHANGE UP
WBC # BLD: 19.84 K/UL — HIGH (ref 3.8–10.5)
WBC # FLD AUTO: 19.84 K/UL — HIGH (ref 3.8–10.5)

## 2021-08-16 PROCEDURE — 87040 BLOOD CULTURE FOR BACTERIA: CPT

## 2021-08-16 PROCEDURE — 85730 THROMBOPLASTIN TIME PARTIAL: CPT

## 2021-08-16 PROCEDURE — U0005: CPT

## 2021-08-16 PROCEDURE — 83690 ASSAY OF LIPASE: CPT

## 2021-08-16 PROCEDURE — 81001 URINALYSIS AUTO W/SCOPE: CPT

## 2021-08-16 PROCEDURE — 84702 CHORIONIC GONADOTROPIN TEST: CPT

## 2021-08-16 PROCEDURE — 99285 EMERGENCY DEPT VISIT HI MDM: CPT

## 2021-08-16 PROCEDURE — 93010 ELECTROCARDIOGRAM REPORT: CPT

## 2021-08-16 PROCEDURE — 93005 ELECTROCARDIOGRAM TRACING: CPT

## 2021-08-16 PROCEDURE — 80053 COMPREHEN METABOLIC PANEL: CPT

## 2021-08-16 PROCEDURE — U0003: CPT

## 2021-08-16 PROCEDURE — 85610 PROTHROMBIN TIME: CPT

## 2021-08-16 PROCEDURE — 83605 ASSAY OF LACTIC ACID: CPT

## 2021-08-16 PROCEDURE — 99284 EMERGENCY DEPT VISIT MOD MDM: CPT | Mod: 25

## 2021-08-16 PROCEDURE — 74177 CT ABD & PELVIS W/CONTRAST: CPT | Mod: 26,ME

## 2021-08-16 PROCEDURE — 96374 THER/PROPH/DIAG INJ IV PUSH: CPT

## 2021-08-16 PROCEDURE — 36415 COLL VENOUS BLD VENIPUNCTURE: CPT

## 2021-08-16 PROCEDURE — 85025 COMPLETE CBC W/AUTO DIFF WBC: CPT

## 2021-08-16 PROCEDURE — G1004: CPT

## 2021-08-16 PROCEDURE — 96375 TX/PRO/DX INJ NEW DRUG ADDON: CPT

## 2021-08-16 PROCEDURE — 87086 URINE CULTURE/COLONY COUNT: CPT

## 2021-08-16 PROCEDURE — 96376 TX/PRO/DX INJ SAME DRUG ADON: CPT

## 2021-08-16 PROCEDURE — 74177 CT ABD & PELVIS W/CONTRAST: CPT

## 2021-08-16 RX ORDER — PIPERACILLIN AND TAZOBACTAM 4; .5 G/20ML; G/20ML
3.38 INJECTION, POWDER, LYOPHILIZED, FOR SOLUTION INTRAVENOUS ONCE
Refills: 0 | Status: COMPLETED | OUTPATIENT
Start: 2021-08-16 | End: 2021-08-16

## 2021-08-16 RX ORDER — ONDANSETRON 8 MG/1
4 TABLET, FILM COATED ORAL ONCE
Refills: 0 | Status: COMPLETED | OUTPATIENT
Start: 2021-08-16 | End: 2021-08-16

## 2021-08-16 RX ORDER — HYDROMORPHONE HYDROCHLORIDE 2 MG/ML
1 INJECTION INTRAMUSCULAR; INTRAVENOUS; SUBCUTANEOUS ONCE
Refills: 0 | Status: DISCONTINUED | OUTPATIENT
Start: 2021-08-16 | End: 2021-08-16

## 2021-08-16 RX ORDER — HYDROMORPHONE HYDROCHLORIDE 2 MG/ML
0.5 INJECTION INTRAMUSCULAR; INTRAVENOUS; SUBCUTANEOUS ONCE
Refills: 0 | Status: DISCONTINUED | OUTPATIENT
Start: 2021-08-16 | End: 2021-08-16

## 2021-08-16 RX ADMIN — HYDROMORPHONE HYDROCHLORIDE 1 MILLIGRAM(S): 2 INJECTION INTRAMUSCULAR; INTRAVENOUS; SUBCUTANEOUS at 20:20

## 2021-08-16 RX ADMIN — ONDANSETRON 4 MILLIGRAM(S): 8 TABLET, FILM COATED ORAL at 20:20

## 2021-08-16 RX ADMIN — PIPERACILLIN AND TAZOBACTAM 200 GRAM(S): 4; .5 INJECTION, POWDER, LYOPHILIZED, FOR SOLUTION INTRAVENOUS at 20:20

## 2021-08-16 RX ADMIN — HYDROMORPHONE HYDROCHLORIDE 0.5 MILLIGRAM(S): 2 INJECTION INTRAMUSCULAR; INTRAVENOUS; SUBCUTANEOUS at 22:53

## 2021-08-16 RX ADMIN — ONDANSETRON 4 MILLIGRAM(S): 8 TABLET, FILM COATED ORAL at 22:53

## 2021-08-16 NOTE — ED STATDOCS - NS ED ROS FT
Gen: No fever, normal appetite, +chills  Eyes: No eye irritation or discharge  ENT: No ear pain, congestion, sore throat  Resp: No cough or trouble breathing  Cardiovascular: No chest pain or palpitation  Gastroenteric: +abd pain, +nausea/vomiting, +diarrhea  :  No change in urine output; no dysuria  MS: No joint or muscle pain  Skin: No rashes  Neuro: No headache; no abnormal movements  Remainder negative, except as per the HPI

## 2021-08-16 NOTE — ED STATDOCS - GASTROINTESTINAL, MLM
abdomen soft, non-tender, and non-distended. Bowel sounds present. abdomen soft, +Mild tenderness lower abd, more on right. No true McBurney's point tenderness. NO rebound. No guarding. No CVAT. Bowel sounds present.

## 2021-08-16 NOTE — ED ADULT TRIAGE NOTE - CHIEF COMPLAINT QUOTE
pt c/o RLQ abdominal pain radiating to back and vomiting x 3 days. denies dysuria or fever. hx- kidney stones

## 2021-08-16 NOTE — ED STATDOCS - PATIENT PORTAL LINK FT
You can access the FollowMyHealth Patient Portal offered by [facility] by registering at the following website: [email or web address]. By joining [facility]’s FollowMyHealth portal, you will also be able to view your health information using other applications (apps) compatible with our system.

## 2021-08-16 NOTE — ED STATDOCS - OBJECTIVE STATEMENT
51 y/o female with a PMHx of Fibromyalgia, CVA, s/p cholecystectomy, presents to the ED c/o R mid abdominal pain with n/v/d and chills the last 2 days,. Last time she felt like this she had an infected renal stone. Pain is constant.

## 2021-08-16 NOTE — ED STATDOCS - NSICDXPASTMEDICALHX_GEN_ALL_CORE_FT
PAST MEDICAL HISTORY:  Anxiety     Arthritis     Breast mass     Cyclical vomiting     Fibromyalgia     History of rib fracture right    Renal stone left

## 2021-08-16 NOTE — ED STATDOCS - PHYSICAL EXAMINATION
GEN - Pt is retching; A+O x3   HEAD - NC/AT     EYES - EOMI, no conjunctival pallor, no scleral icterus  ENT -   mucous membranes  moist , no discharge      NECK - Neck supple  PULM - CTA b/l,  symmetric breath sounds  COR -  RRR, S1 S2, no murmurs  ABD - , ND, +TTP R mid abdomen, soft, no guarding, no rebound, no masses    BACK - no CVA tenderness, nontender spine     EXTREMS - no edema, no deformity, warm and well perfused    SKIN - no rash or bruising      NEUROLOGIC - alert, sensation nl, motor 5/5 RUE/LUE/RLE/LLE

## 2021-08-16 NOTE — ED ADULT NURSE NOTE - OBJECTIVE STATEMENT
pt presents to the ED with abd pain radiating to the back with associated chills. denies known fevers. denies chest pain, SOB. reports hx of urosepsis in the past.

## 2021-08-16 NOTE — ED STATDOCS - PROGRESS NOTE DETAILS
PA: Patient is a 49 y/o female with a PMHx of Fibromyalgia, CVA, s/p cholecystectomy, who presents to [facility] c/o constant right mid abdominal pain with n/v/d and chills the last 2 days. Patient describes her pain as her previous episode of infected renal stone. ~Adam Rausch PA-C   Patient seen and evaluated in . Will get labs, CT, pain control. Reassess. ~Adam Rausch PA-C CT scan pending. Care transitioned to BETH Dominguez. ~Adam Rausch PA-C Leukocytosis present.  ? gastritis, ? colitis on CT scan.  Pt feeling better, asking for water. Will treat, give reglan for possible gastroparesis.  D/c home on trial oral meds, reglan.  F/u with Dr. Childs.  REeturn precautions given. Micheal Mendez D.O.

## 2021-08-16 NOTE — ED STATDOCS - CLINICAL SUMMARY MEDICAL DECISION MAKING FREE TEXT BOX
Pt with R sided abdominal pain and vomiting, will CT, will check UA, reassess. Pt with R sided abdominal pain and vomiting, will CT, will check UA, reassess.    CT scan pending. Care transitioned to BETH Dominguez. ~Adam Rausch PA-C

## 2021-08-16 NOTE — ED ADULT NURSE NOTE - NSICDXPASTSURGICALHX_GEN_ALL_CORE_FT
PAST SURGICAL HISTORY:  Breast cyst right  (x2)   2014  (last one)    Carpal tunnel syndrome of left wrist 2006    Carpal tunnel syndrome of right wrist     H/O umbilical hernia repair 2017    H/O: hysterectomy 2017    History of cholecystectomy 1992    History of lumbar fusion     S/P  ,

## 2021-08-17 ENCOUNTER — INPATIENT (INPATIENT)
Facility: HOSPITAL | Age: 51
LOS: 3 days | Discharge: HOME | DRG: 372 | End: 2021-08-21
Attending: FAMILY MEDICINE | Admitting: HOSPITALIST
Payer: COMMERCIAL

## 2021-08-17 VITALS
OXYGEN SATURATION: 94 % | HEART RATE: 67 BPM | TEMPERATURE: 99 F | SYSTOLIC BLOOD PRESSURE: 131 MMHG | RESPIRATION RATE: 16 BRPM | DIASTOLIC BLOOD PRESSURE: 92 MMHG

## 2021-08-17 DIAGNOSIS — G56.02 CARPAL TUNNEL SYNDROME, LEFT UPPER LIMB: Chronic | ICD-10-CM

## 2021-08-17 DIAGNOSIS — R11.2 NAUSEA WITH VOMITING, UNSPECIFIED: ICD-10-CM

## 2021-08-17 DIAGNOSIS — Z98.1 ARTHRODESIS STATUS: Chronic | ICD-10-CM

## 2021-08-17 DIAGNOSIS — N60.09 SOLITARY CYST OF UNSPECIFIED BREAST: Chronic | ICD-10-CM

## 2021-08-17 DIAGNOSIS — Z90.49 ACQUIRED ABSENCE OF OTHER SPECIFIED PARTS OF DIGESTIVE TRACT: Chronic | ICD-10-CM

## 2021-08-17 DIAGNOSIS — Z98.891 HISTORY OF UTERINE SCAR FROM PREVIOUS SURGERY: Chronic | ICD-10-CM

## 2021-08-17 DIAGNOSIS — G56.01 CARPAL TUNNEL SYNDROME, RIGHT UPPER LIMB: Chronic | ICD-10-CM

## 2021-08-17 DIAGNOSIS — Z98.890 OTHER SPECIFIED POSTPROCEDURAL STATES: Chronic | ICD-10-CM

## 2021-08-17 DIAGNOSIS — Z90.710 ACQUIRED ABSENCE OF BOTH CERVIX AND UTERUS: Chronic | ICD-10-CM

## 2021-08-17 DIAGNOSIS — A04.9 BACTERIAL INTESTINAL INFECTION, UNSPECIFIED: ICD-10-CM

## 2021-08-17 LAB
ALBUMIN SERPL ELPH-MCNC: 3.9 G/DL — SIGNIFICANT CHANGE UP (ref 3.3–5)
ALP SERPL-CCNC: 79 U/L — SIGNIFICANT CHANGE UP (ref 40–120)
ALT FLD-CCNC: 27 U/L — SIGNIFICANT CHANGE UP (ref 12–78)
ANION GAP SERPL CALC-SCNC: 1 MMOL/L — LOW (ref 5–17)
APPEARANCE UR: CLEAR — SIGNIFICANT CHANGE UP
AST SERPL-CCNC: 54 U/L — HIGH (ref 15–37)
BASOPHILS # BLD AUTO: 0.07 K/UL — SIGNIFICANT CHANGE UP (ref 0–0.2)
BASOPHILS NFR BLD AUTO: 0.3 % — SIGNIFICANT CHANGE UP (ref 0–2)
BILIRUB SERPL-MCNC: 0.9 MG/DL — SIGNIFICANT CHANGE UP (ref 0.2–1.2)
BILIRUB UR-MCNC: NEGATIVE — SIGNIFICANT CHANGE UP
BUN SERPL-MCNC: 11 MG/DL — SIGNIFICANT CHANGE UP (ref 7–23)
CALCIUM SERPL-MCNC: 9.1 MG/DL — SIGNIFICANT CHANGE UP (ref 8.5–10.1)
CHLORIDE SERPL-SCNC: 105 MMOL/L — SIGNIFICANT CHANGE UP (ref 96–108)
CO2 SERPL-SCNC: 29 MMOL/L — SIGNIFICANT CHANGE UP (ref 22–31)
COLOR SPEC: YELLOW — SIGNIFICANT CHANGE UP
CREAT SERPL-MCNC: 0.62 MG/DL — SIGNIFICANT CHANGE UP (ref 0.5–1.3)
DIFF PNL FLD: NEGATIVE — SIGNIFICANT CHANGE UP
EOSINOPHIL # BLD AUTO: 0.04 K/UL — SIGNIFICANT CHANGE UP (ref 0–0.5)
EOSINOPHIL NFR BLD AUTO: 0.2 % — SIGNIFICANT CHANGE UP (ref 0–6)
GLUCOSE SERPL-MCNC: 101 MG/DL — HIGH (ref 70–99)
GLUCOSE UR QL: NEGATIVE MG/DL — SIGNIFICANT CHANGE UP
HCT VFR BLD CALC: 43.8 % — SIGNIFICANT CHANGE UP (ref 34.5–45)
HGB BLD-MCNC: 14.6 G/DL — SIGNIFICANT CHANGE UP (ref 11.5–15.5)
IMM GRANULOCYTES NFR BLD AUTO: 1 % — SIGNIFICANT CHANGE UP (ref 0–1.5)
KETONES UR-MCNC: NEGATIVE — SIGNIFICANT CHANGE UP
LACTATE SERPL-SCNC: 0.9 MMOL/L — SIGNIFICANT CHANGE UP (ref 0.7–2)
LEUKOCYTE ESTERASE UR-ACNC: NEGATIVE — SIGNIFICANT CHANGE UP
LYMPHOCYTES # BLD AUTO: 14.3 % — SIGNIFICANT CHANGE UP (ref 13–44)
LYMPHOCYTES # BLD AUTO: 2.87 K/UL — SIGNIFICANT CHANGE UP (ref 1–3.3)
MCHC RBC-ENTMCNC: 28.6 PG — SIGNIFICANT CHANGE UP (ref 27–34)
MCHC RBC-ENTMCNC: 33.3 GM/DL — SIGNIFICANT CHANGE UP (ref 32–36)
MCV RBC AUTO: 85.7 FL — SIGNIFICANT CHANGE UP (ref 80–100)
MONOCYTES # BLD AUTO: 1.12 K/UL — HIGH (ref 0–0.9)
MONOCYTES NFR BLD AUTO: 5.6 % — SIGNIFICANT CHANGE UP (ref 2–14)
NEUTROPHILS # BLD AUTO: 15.83 K/UL — HIGH (ref 1.8–7.4)
NEUTROPHILS NFR BLD AUTO: 78.6 % — HIGH (ref 43–77)
NITRITE UR-MCNC: NEGATIVE — SIGNIFICANT CHANGE UP
PH UR: 6 — SIGNIFICANT CHANGE UP (ref 5–8)
PLATELET # BLD AUTO: 399 K/UL — SIGNIFICANT CHANGE UP (ref 150–400)
POTASSIUM SERPL-MCNC: 5.1 MMOL/L — SIGNIFICANT CHANGE UP (ref 3.5–5.3)
POTASSIUM SERPL-SCNC: 5.1 MMOL/L — SIGNIFICANT CHANGE UP (ref 3.5–5.3)
PROT SERPL-MCNC: 8 GM/DL — SIGNIFICANT CHANGE UP (ref 6–8.3)
PROT UR-MCNC: NEGATIVE MG/DL — SIGNIFICANT CHANGE UP
RBC # BLD: 5.11 M/UL — SIGNIFICANT CHANGE UP (ref 3.8–5.2)
RBC # FLD: 14 % — SIGNIFICANT CHANGE UP (ref 10.3–14.5)
SARS-COV-2 RNA SPEC QL NAA+PROBE: SIGNIFICANT CHANGE UP
SARS-COV-2 RNA SPEC QL NAA+PROBE: SIGNIFICANT CHANGE UP
SODIUM SERPL-SCNC: 135 MMOL/L — SIGNIFICANT CHANGE UP (ref 135–145)
SP GR SPEC: 1.02 — SIGNIFICANT CHANGE UP (ref 1.01–1.02)
UROBILINOGEN FLD QL: NEGATIVE MG/DL — SIGNIFICANT CHANGE UP
WBC # BLD: 20.13 K/UL — HIGH (ref 3.8–10.5)
WBC # FLD AUTO: 20.13 K/UL — HIGH (ref 3.8–10.5)

## 2021-08-17 PROCEDURE — 83690 ASSAY OF LIPASE: CPT

## 2021-08-17 PROCEDURE — 36415 COLL VENOUS BLD VENIPUNCTURE: CPT

## 2021-08-17 PROCEDURE — 88305 TISSUE EXAM BY PATHOLOGIST: CPT

## 2021-08-17 PROCEDURE — 93010 ELECTROCARDIOGRAM REPORT: CPT

## 2021-08-17 PROCEDURE — 86769 SARS-COV-2 COVID-19 ANTIBODY: CPT

## 2021-08-17 PROCEDURE — 85027 COMPLETE CBC AUTOMATED: CPT

## 2021-08-17 PROCEDURE — 87493 C DIFF AMPLIFIED PROBE: CPT

## 2021-08-17 PROCEDURE — 99285 EMERGENCY DEPT VISIT HI MDM: CPT

## 2021-08-17 PROCEDURE — 74018 RADEX ABDOMEN 1 VIEW: CPT | Mod: 26

## 2021-08-17 PROCEDURE — 83735 ASSAY OF MAGNESIUM: CPT

## 2021-08-17 PROCEDURE — 99222 1ST HOSP IP/OBS MODERATE 55: CPT

## 2021-08-17 PROCEDURE — 80076 HEPATIC FUNCTION PANEL: CPT

## 2021-08-17 PROCEDURE — C9113: CPT

## 2021-08-17 PROCEDURE — 80048 BASIC METABOLIC PNL TOTAL CA: CPT

## 2021-08-17 PROCEDURE — 87507 IADNA-DNA/RNA PROBE TQ 12-25: CPT

## 2021-08-17 PROCEDURE — 88312 SPECIAL STAINS GROUP 1: CPT

## 2021-08-17 RX ORDER — CIPROFLOXACIN LACTATE 400MG/40ML
400 VIAL (ML) INTRAVENOUS EVERY 12 HOURS
Refills: 0 | Status: DISCONTINUED | OUTPATIENT
Start: 2021-08-18 | End: 2021-08-21

## 2021-08-17 RX ORDER — MORPHINE SULFATE 50 MG/1
2 CAPSULE, EXTENDED RELEASE ORAL EVERY 4 HOURS
Refills: 0 | Status: DISCONTINUED | OUTPATIENT
Start: 2021-08-17 | End: 2021-08-17

## 2021-08-17 RX ORDER — HYDROMORPHONE HYDROCHLORIDE 2 MG/ML
1 INJECTION INTRAMUSCULAR; INTRAVENOUS; SUBCUTANEOUS EVERY 6 HOURS
Refills: 0 | Status: DISCONTINUED | OUTPATIENT
Start: 2021-08-17 | End: 2021-08-20

## 2021-08-17 RX ORDER — METRONIDAZOLE 500 MG
500 TABLET ORAL ONCE
Refills: 0 | Status: COMPLETED | OUTPATIENT
Start: 2021-08-17 | End: 2021-08-17

## 2021-08-17 RX ORDER — NICOTINE POLACRILEX 2 MG
1 GUM BUCCAL DAILY
Refills: 0 | Status: DISCONTINUED | OUTPATIENT
Start: 2021-08-17 | End: 2021-08-17

## 2021-08-17 RX ORDER — OXYCODONE HYDROCHLORIDE 5 MG/1
1 TABLET ORAL
Qty: 0 | Refills: 0 | DISCHARGE

## 2021-08-17 RX ORDER — SODIUM CHLORIDE 9 MG/ML
1000 INJECTION INTRAMUSCULAR; INTRAVENOUS; SUBCUTANEOUS ONCE
Refills: 0 | Status: COMPLETED | OUTPATIENT
Start: 2021-08-17 | End: 2021-08-17

## 2021-08-17 RX ORDER — FAMOTIDINE 10 MG/ML
20 INJECTION INTRAVENOUS ONCE
Refills: 0 | Status: COMPLETED | OUTPATIENT
Start: 2021-08-17 | End: 2021-08-17

## 2021-08-17 RX ORDER — HYDROMORPHONE HYDROCHLORIDE 2 MG/ML
0.5 INJECTION INTRAMUSCULAR; INTRAVENOUS; SUBCUTANEOUS ONCE
Refills: 0 | Status: DISCONTINUED | OUTPATIENT
Start: 2021-08-17 | End: 2021-08-17

## 2021-08-17 RX ORDER — SODIUM CHLORIDE 9 MG/ML
1000 INJECTION INTRAMUSCULAR; INTRAVENOUS; SUBCUTANEOUS
Refills: 0 | Status: DISCONTINUED | OUTPATIENT
Start: 2021-08-17 | End: 2021-08-19

## 2021-08-17 RX ORDER — METRONIDAZOLE 500 MG
1 TABLET ORAL
Qty: 21 | Refills: 0
Start: 2021-08-17 | End: 2021-08-23

## 2021-08-17 RX ORDER — CIPROFLOXACIN LACTATE 400MG/40ML
400 VIAL (ML) INTRAVENOUS ONCE
Refills: 0 | Status: COMPLETED | OUTPATIENT
Start: 2021-08-17 | End: 2021-08-17

## 2021-08-17 RX ORDER — MOXIFLOXACIN HYDROCHLORIDE TABLETS, 400 MG 400 MG/1
1 TABLET, FILM COATED ORAL
Qty: 14 | Refills: 0
Start: 2021-08-17 | End: 2021-08-23

## 2021-08-17 RX ORDER — METRONIDAZOLE 500 MG
500 TABLET ORAL EVERY 12 HOURS
Refills: 0 | Status: DISCONTINUED | OUTPATIENT
Start: 2021-08-18 | End: 2021-08-18

## 2021-08-17 RX ORDER — ONDANSETRON 8 MG/1
4 TABLET, FILM COATED ORAL ONCE
Refills: 0 | Status: COMPLETED | OUTPATIENT
Start: 2021-08-17 | End: 2021-08-17

## 2021-08-17 RX ORDER — METOCLOPRAMIDE HCL 10 MG
1 TABLET ORAL
Qty: 21 | Refills: 0
Start: 2021-08-17 | End: 2021-08-23

## 2021-08-17 RX ORDER — HYDROMORPHONE HYDROCHLORIDE 2 MG/ML
1 INJECTION INTRAMUSCULAR; INTRAVENOUS; SUBCUTANEOUS ONCE
Refills: 0 | Status: DISCONTINUED | OUTPATIENT
Start: 2021-08-17 | End: 2021-08-17

## 2021-08-17 RX ORDER — LEVORPHANOL TARTRATE 3 MG/1
1 TABLET ORAL
Qty: 0 | Refills: 0 | DISCHARGE

## 2021-08-17 RX ADMIN — HYDROMORPHONE HYDROCHLORIDE 0.5 MILLIGRAM(S): 2 INJECTION INTRAMUSCULAR; INTRAVENOUS; SUBCUTANEOUS at 22:27

## 2021-08-17 RX ADMIN — FAMOTIDINE 20 MILLIGRAM(S): 10 INJECTION INTRAVENOUS at 20:08

## 2021-08-17 RX ADMIN — HYDROMORPHONE HYDROCHLORIDE 1 MILLIGRAM(S): 2 INJECTION INTRAMUSCULAR; INTRAVENOUS; SUBCUTANEOUS at 20:08

## 2021-08-17 RX ADMIN — SODIUM CHLORIDE 1000 MILLILITER(S): 9 INJECTION INTRAMUSCULAR; INTRAVENOUS; SUBCUTANEOUS at 20:09

## 2021-08-17 RX ADMIN — ONDANSETRON 4 MILLIGRAM(S): 8 TABLET, FILM COATED ORAL at 22:27

## 2021-08-17 RX ADMIN — ONDANSETRON 4 MILLIGRAM(S): 8 TABLET, FILM COATED ORAL at 20:08

## 2021-08-17 RX ADMIN — Medication 100 MILLIGRAM(S): at 23:32

## 2021-08-17 RX ADMIN — Medication 200 MILLIGRAM(S): at 22:27

## 2021-08-17 NOTE — H&P ADULT - NSHPPHYSICALEXAM_GEN_ALL_CORE
Vital Signs Last 24 Hrs  T(C): 37.3 (17 Aug 2021 21:16), Max: 37.4 (16 Aug 2021 23:40)  T(F): 99.1 (17 Aug 2021 21:16), Max: 99.4 (17 Aug 2021 18:50)  HR: 60 (17 Aug 2021 21:16) (60 - 67)  BP: 154/74 (17 Aug 2021 21:16) (131/92 - 154/74)  BP(mean): 91 (17 Aug 2021 21:16) (89 - 91)  RR: 14 (17 Aug 2021 21:16) (14 - 16)  SpO2: 98% (17 Aug 2021 21:16) (94% - 99%)

## 2021-08-17 NOTE — ED STATDOCS - CLINICAL SUMMARY MEDICAL DECISION MAKING FREE TEXT BOX
intractable nausea vomiting unable to tolerate PO after DC yesterday for colitis/gastritis. Admit for IV fluids and meds.

## 2021-08-17 NOTE — ED STATDOCS - PROGRESS NOTE DETAILS
signed Alejandra Dyson PA-C Pt seen initially in intake by Dr Mendez.   50F DCed from ER yesterday with abx for colitis/gastritis. Pt was unable to tolerate any PO meds at home and continues to vomit. Pt unable to tolerate PO in ED. Will admit for IV fluids and meds. Pt agrees with admission and plan of care. [facility] Tye Childs Case discussed with and admission accepted by hospitalist Dr. Self

## 2021-08-17 NOTE — H&P ADULT - HISTORY OF PRESENT ILLNESS
48  50 year old female patient with history of cyclic vomiting syndrome currently in remission presented to the ED complaining of a 6 day history profound nausea, vomiting and diarrhea associated with diffuse abdominal pain and chills. Patient states she has not been to eat or keep solids or liquids down. Denies any recent travel, fever, trauma, recent abx use. Without urinary symptoms or cough.      In the ED patient with leukocytosis of 20. CT abdomen/pelvis done on 8/16/2021 noted for: No bowel obstruction, drainable fluid collection or intraperitoneal free air. Prominent wall of the left colon without inflammatory change, which may be due to partial distention. Recommend clinical correlation to assess colitis.    Prominent distal gastric wall, which may be due to distention and/or gastritis. Recommend clinical correlation. Follow-up endoscopy may be obtained for further evaluation.

## 2021-08-17 NOTE — H&P ADULT - ASSESSMENT
50 year old female with intractable nausea, vomiting and diarrhea in the setting of Colitis        -Admit to [facility]        # Intractable Nausea and vomiting  -zofran prn  -IVF hydration  -clear liquid diet and advance as tolerated    # Colitis  -give cipro/ flagyl  -serial abdominal examinations  -IVF hydration    #Leukocytosis  -likely secondary to above    # Anxiety  -ativan prn    # DVT ppx  -encourage ambulation

## 2021-08-17 NOTE — ED STATDOCS - OBJECTIVE STATEMENT
51 y/o female with a PMHx of Anxiety, Fibromyalgia, Cyclical vomiting, s/p cholecystectomy, presents to the ED c/o persistent vomiting and diffuse abdominal pain. Pt was seen in the ED yesterday and seen with ?gastritis/ ?colitis on CT scan, was DC on trial oral meds and Reglan and to f/u with GI but sx persisted so returned to the ED.

## 2021-08-17 NOTE — ED POST DISCHARGE NOTE - RESULT SUMMARY
patient called that augmentin was not in stock at the pharmacy, resent cipro/flagyl as an alernative, reviewed this with patient who also states she is still vomiting.  recommended return for IV abx if cannot tolerate PO -Shannon Haynes PA-C

## 2021-08-17 NOTE — ED ADULT NURSE NOTE - OBJECTIVE STATEMENT
49 y/o female with a PMHx of Anxiety, Fibromyalgia, Cyclical vomiting, s/p cholecystectomy, presents to the ED c/o persistent vomiting and diffuse abdominal pain. Pt was seen in the ED yesterday and seen with ?gastritis/ ?colitis on CT scan, was DC on trial oral meds and Reglan and to f/u with GI but sx persisted so returned to the ED.

## 2021-08-17 NOTE — ED STATDOCS - CARE PLAN
1 Principal Discharge DX:	Nausea and vomiting  Secondary Diagnosis:	Colitis  Secondary Diagnosis:	Gastritis  Secondary Diagnosis:	Abdominal pain

## 2021-08-18 DIAGNOSIS — K29.70 GASTRITIS, UNSPECIFIED, WITHOUT BLEEDING: ICD-10-CM

## 2021-08-18 DIAGNOSIS — R10.9 UNSPECIFIED ABDOMINAL PAIN: ICD-10-CM

## 2021-08-18 DIAGNOSIS — E87.6 HYPOKALEMIA: ICD-10-CM

## 2021-08-18 DIAGNOSIS — Z29.9 ENCOUNTER FOR PROPHYLACTIC MEASURES, UNSPECIFIED: ICD-10-CM

## 2021-08-18 DIAGNOSIS — F17.200 NICOTINE DEPENDENCE, UNSPECIFIED, UNCOMPLICATED: ICD-10-CM

## 2021-08-18 DIAGNOSIS — K52.9 NONINFECTIVE GASTROENTERITIS AND COLITIS, UNSPECIFIED: ICD-10-CM

## 2021-08-18 LAB
ANION GAP SERPL CALC-SCNC: 6 MMOL/L — SIGNIFICANT CHANGE UP (ref 5–17)
BUN SERPL-MCNC: 10 MG/DL — SIGNIFICANT CHANGE UP (ref 7–23)
CALCIUM SERPL-MCNC: 8.5 MG/DL — SIGNIFICANT CHANGE UP (ref 8.5–10.1)
CHLORIDE SERPL-SCNC: 106 MMOL/L — SIGNIFICANT CHANGE UP (ref 96–108)
CO2 SERPL-SCNC: 26 MMOL/L — SIGNIFICANT CHANGE UP (ref 22–31)
COVID-19 SPIKE DOMAIN AB INTERP: POSITIVE
COVID-19 SPIKE DOMAIN ANTIBODY RESULT: 204 U/ML — HIGH
CREAT SERPL-MCNC: 0.52 MG/DL — SIGNIFICANT CHANGE UP (ref 0.5–1.3)
CULTURE RESULTS: SIGNIFICANT CHANGE UP
CULTURE RESULTS: SIGNIFICANT CHANGE UP
GLUCOSE SERPL-MCNC: 98 MG/DL — SIGNIFICANT CHANGE UP (ref 70–99)
HCT VFR BLD CALC: 38.6 % — SIGNIFICANT CHANGE UP (ref 34.5–45)
HGB BLD-MCNC: 12.9 G/DL — SIGNIFICANT CHANGE UP (ref 11.5–15.5)
LIDOCAIN IGE QN: 142 U/L — SIGNIFICANT CHANGE UP (ref 73–393)
MCHC RBC-ENTMCNC: 29 PG — SIGNIFICANT CHANGE UP (ref 27–34)
MCHC RBC-ENTMCNC: 33.4 GM/DL — SIGNIFICANT CHANGE UP (ref 32–36)
MCV RBC AUTO: 86.7 FL — SIGNIFICANT CHANGE UP (ref 80–100)
PLATELET # BLD AUTO: 335 K/UL — SIGNIFICANT CHANGE UP (ref 150–400)
POTASSIUM SERPL-MCNC: 3.1 MMOL/L — LOW (ref 3.5–5.3)
POTASSIUM SERPL-SCNC: 3.1 MMOL/L — LOW (ref 3.5–5.3)
RBC # BLD: 4.45 M/UL — SIGNIFICANT CHANGE UP (ref 3.8–5.2)
RBC # FLD: 14 % — SIGNIFICANT CHANGE UP (ref 10.3–14.5)
SARS-COV-2 IGG+IGM SERPL QL IA: 204 U/ML — HIGH
SARS-COV-2 IGG+IGM SERPL QL IA: POSITIVE
SODIUM SERPL-SCNC: 138 MMOL/L — SIGNIFICANT CHANGE UP (ref 135–145)
SPECIMEN SOURCE: SIGNIFICANT CHANGE UP
SPECIMEN SOURCE: SIGNIFICANT CHANGE UP
WBC # BLD: 16.35 K/UL — HIGH (ref 3.8–10.5)
WBC # FLD AUTO: 16.35 K/UL — HIGH (ref 3.8–10.5)

## 2021-08-18 PROCEDURE — 99233 SBSQ HOSP IP/OBS HIGH 50: CPT

## 2021-08-18 RX ORDER — OXYCODONE HYDROCHLORIDE 5 MG/1
15 TABLET ORAL DAILY
Refills: 0 | Status: DISCONTINUED | OUTPATIENT
Start: 2021-08-18 | End: 2021-08-21

## 2021-08-18 RX ORDER — ONDANSETRON 8 MG/1
4 TABLET, FILM COATED ORAL ONCE
Refills: 0 | Status: COMPLETED | OUTPATIENT
Start: 2021-08-18 | End: 2021-08-18

## 2021-08-18 RX ORDER — NICOTINE POLACRILEX 2 MG
1 GUM BUCCAL DAILY
Refills: 0 | Status: DISCONTINUED | OUTPATIENT
Start: 2021-08-18 | End: 2021-08-21

## 2021-08-18 RX ORDER — OXYCODONE HYDROCHLORIDE 5 MG/1
15 TABLET ORAL EVERY 12 HOURS
Refills: 0 | Status: DISCONTINUED | OUTPATIENT
Start: 2021-08-18 | End: 2021-08-21

## 2021-08-18 RX ORDER — POTASSIUM CHLORIDE 20 MEQ
40 PACKET (EA) ORAL EVERY 4 HOURS
Refills: 0 | Status: COMPLETED | OUTPATIENT
Start: 2021-08-18 | End: 2021-08-18

## 2021-08-18 RX ORDER — PANTOPRAZOLE SODIUM 20 MG/1
40 TABLET, DELAYED RELEASE ORAL
Refills: 0 | Status: DISCONTINUED | OUTPATIENT
Start: 2021-08-18 | End: 2021-08-21

## 2021-08-18 RX ORDER — ONDANSETRON 8 MG/1
4 TABLET, FILM COATED ORAL EVERY 6 HOURS
Refills: 0 | Status: DISCONTINUED | OUTPATIENT
Start: 2021-08-18 | End: 2021-08-21

## 2021-08-18 RX ORDER — METRONIDAZOLE 500 MG
500 TABLET ORAL EVERY 8 HOURS
Refills: 0 | Status: DISCONTINUED | OUTPATIENT
Start: 2021-08-18 | End: 2021-08-21

## 2021-08-18 RX ORDER — METOCLOPRAMIDE HCL 10 MG
5 TABLET ORAL EVERY 8 HOURS
Refills: 0 | Status: DISCONTINUED | OUTPATIENT
Start: 2021-08-18 | End: 2021-08-21

## 2021-08-18 RX ADMIN — ONDANSETRON 4 MILLIGRAM(S): 8 TABLET, FILM COATED ORAL at 09:51

## 2021-08-18 RX ADMIN — HYDROMORPHONE HYDROCHLORIDE 1 MILLIGRAM(S): 2 INJECTION INTRAMUSCULAR; INTRAVENOUS; SUBCUTANEOUS at 07:01

## 2021-08-18 RX ADMIN — Medication 100 MILLIGRAM(S): at 15:10

## 2021-08-18 RX ADMIN — OXYCODONE HYDROCHLORIDE 15 MILLIGRAM(S): 5 TABLET ORAL at 12:17

## 2021-08-18 RX ADMIN — OXYCODONE HYDROCHLORIDE 15 MILLIGRAM(S): 5 TABLET ORAL at 15:09

## 2021-08-18 RX ADMIN — Medication 40 MILLIEQUIVALENT(S): at 17:17

## 2021-08-18 RX ADMIN — OXYCODONE HYDROCHLORIDE 15 MILLIGRAM(S): 5 TABLET ORAL at 21:34

## 2021-08-18 RX ADMIN — SODIUM CHLORIDE 125 MILLILITER(S): 9 INJECTION INTRAMUSCULAR; INTRAVENOUS; SUBCUTANEOUS at 00:41

## 2021-08-18 RX ADMIN — HYDROMORPHONE HYDROCHLORIDE 1 MILLIGRAM(S): 2 INJECTION INTRAMUSCULAR; INTRAVENOUS; SUBCUTANEOUS at 00:46

## 2021-08-18 RX ADMIN — Medication 200 MILLIGRAM(S): at 21:33

## 2021-08-18 RX ADMIN — Medication 1 MILLIGRAM(S): at 04:18

## 2021-08-18 RX ADMIN — HYDROMORPHONE HYDROCHLORIDE 1 MILLIGRAM(S): 2 INJECTION INTRAMUSCULAR; INTRAVENOUS; SUBCUTANEOUS at 18:53

## 2021-08-18 RX ADMIN — Medication 40 MILLIEQUIVALENT(S): at 15:07

## 2021-08-18 RX ADMIN — ONDANSETRON 4 MILLIGRAM(S): 8 TABLET, FILM COATED ORAL at 12:17

## 2021-08-18 RX ADMIN — Medication 200 MILLIGRAM(S): at 09:42

## 2021-08-18 RX ADMIN — Medication 100 MILLIGRAM(S): at 21:34

## 2021-08-18 RX ADMIN — OXYCODONE HYDROCHLORIDE 15 MILLIGRAM(S): 5 TABLET ORAL at 22:10

## 2021-08-18 RX ADMIN — PANTOPRAZOLE SODIUM 40 MILLIGRAM(S): 20 TABLET, DELAYED RELEASE ORAL at 09:45

## 2021-08-18 RX ADMIN — PANTOPRAZOLE SODIUM 40 MILLIGRAM(S): 20 TABLET, DELAYED RELEASE ORAL at 21:34

## 2021-08-18 RX ADMIN — SODIUM CHLORIDE 125 MILLILITER(S): 9 INJECTION INTRAMUSCULAR; INTRAVENOUS; SUBCUTANEOUS at 17:18

## 2021-08-18 RX ADMIN — SODIUM CHLORIDE 125 MILLILITER(S): 9 INJECTION INTRAMUSCULAR; INTRAVENOUS; SUBCUTANEOUS at 04:20

## 2021-08-18 RX ADMIN — HYDROMORPHONE HYDROCHLORIDE 1 MILLIGRAM(S): 2 INJECTION INTRAMUSCULAR; INTRAVENOUS; SUBCUTANEOUS at 00:59

## 2021-08-18 RX ADMIN — HYDROMORPHONE HYDROCHLORIDE 1 MILLIGRAM(S): 2 INJECTION INTRAMUSCULAR; INTRAVENOUS; SUBCUTANEOUS at 06:49

## 2021-08-18 NOTE — CONSULT NOTE ADULT - ASSESSMENT
Imp:  N/V, abdominal pain, diarrhea and leukocytosis typical for gastroenteritis  CT findings of thickened stomach and colon are non-specific but merit elective endoscoipc evaluation    Rec:  Check stool c. diff and GI PCR  Cont abx and protonix  Observation for now -- could consider endoscopic eval if stool neg and symptoms persist but otherwise would defer to outpatient  Importance of outpatient follow up emphasized

## 2021-08-18 NOTE — CONSULT NOTE ADULT - SUBJECTIVE AND OBJECTIVE BOX
HPI:  50 year old female patient with history of cyclic vomiting syndrome currently in remission presented to the ED complaining of a 6 day history profound nausea, vomiting and diarrhea associated with diffuse abdominal pain and chills. Patient states she has not been to eat or keep solids or liquids down. Denies any recent travel, fever, trauma, recent abx use. Without urinary symptoms or cough.      In the ED patient with leukocytosis of 20. CT abdomen/pelvis done on 2021 noted for: No bowel obstruction, drainable fluid collection or intraperitoneal free air. Prominent wall of the left colon without inflammatory change, which may be due to partial distention. Recommend clinical correlation to assess colitis.    Prominent distal gastric wall, which may be due to distention and/or gastritis. Recommend clinical correlation. Follow-up endoscopy may be obtained for further evaluation.   (17 Aug 2021 22:19)  -------------  Nausea seems to be better. Pain comes and goes. Diarrhea was better but recurred today.    PAST MEDICAL & SURGICAL HISTORY:  Fibromyalgia    Anxiety    Arthritis    Breast mass    Cyclical vomiting    Renal stone  left    History of rib fracture  right    H/O umbilical hernia repair  2017    H/O: hysterectomy  2017    S/P   2003, 2004    History of lumbar fusion  2011    Carpal tunnel syndrome of left wrist  2006    Carpal tunnel syndrome of right wrist  2007    History of cholecystectomy  1992    Breast cyst  right  (x2)     (last one)        Home Medications:  oxyCODONE 15 mg oral tablet, extended release: 1 tab(s) orally every 12 hours (17 Aug 2021 22:39)  OxyCONTIN 15 mg oral tablet, extended release: 1 tab(s) orally every 12 hours (17 Aug 2021 22:38)      MEDICATIONS  (STANDING):  ciprofloxacin   IVPB 400 milliGRAM(s) IV Intermittent every 12 hours  metroNIDAZOLE  IVPB 500 milliGRAM(s) IV Intermittent every 8 hours  nicotine - 21 mG/24Hr(s) Patch 1 patch Transdermal daily  oxyCODONE  ER Tablet 15 milliGRAM(s) Oral every 12 hours  pantoprazole  Injectable 40 milliGRAM(s) IV Push two times a day  potassium chloride    Tablet ER 40 milliEquivalent(s) Oral every 4 hours  sodium chloride 0.9%. 1000 milliLiter(s) (125 mL/Hr) IV Continuous <Continuous>    MEDICATIONS  (PRN):  HYDROmorphone  Injectable 1 milliGRAM(s) IV Push every 6 hours PRN Severe Pain (7 - 10)  metoclopramide Injectable 5 milliGRAM(s) IV Push every 8 hours PRN N/V  ondansetron Injectable 4 milliGRAM(s) IV Push every 6 hours PRN Nausea and/or Vomiting  oxyCODONE    IR 15 milliGRAM(s) Oral daily PRN Severe Pain (7 - 10)      Allergies    morphine (Other)    Intolerances        SOCIAL HISTORY:    FAMILY HISTORY:      ROS  As above  Otherwise unremarkable    Vital Signs Last 24 Hrs  T(C): 36.8 (18 Aug 2021 15:00), Max: 37.4 (17 Aug 2021 18:50)  T(F): 98.3 (18 Aug 2021 15:00), Max: 99.4 (17 Aug 2021 18:50)  HR: 58 (18 Aug 2021 15:00) (58 - 67)  BP: 150/75 (18 Aug 2021 15:00) (131/92 - 165/75)  BP(mean): 92 (18 Aug 2021 00:09) (91 - 92)  RR: 19 (18 Aug 2021 15:00) (14 - 19)  SpO2: 95% (18 Aug 2021 15:00) (94% - 98%)    Constitutional: NAD, well-developed  Respiratory: CTAB  Cardiovascular: S1 and S2, RRR  Gastrointestinal: BS+, soft, NT/ND  Extremities: No peripheral edema  Psychiatric: Normal mood, normal affect  Skin: No rashes    LABS:                        12.9   16.35 )-----------( 335      ( 18 Aug 2021 06:45 )             38.6     08-18    138  |  106  |  10  ----------------------------<  98  3.1<L>   |  26  |  0.52    Ca    8.5      18 Aug 2021 06:45    TPro  6.6  /  Alb  3.4  /  TBili  0.6  /  DBili  0.1  /  AST  13<L>  /  ALT  20  /  AlkPhos  67  08-18    PT/INR - ( 16 Aug 2021 20:08 )   PT: 13.2 sec;   INR: 1.14 ratio         PTT - ( 16 Aug 2021 20:08 )  PTT:32.8 sec  LIVER FUNCTIONS - ( 18 Aug 2021 06:45 )  Alb: 3.4 g/dL / Pro: 6.6 gm/dL / ALK PHOS: 67 U/L / ALT: 20 U/L / AST: 13 U/L / GGT: x             RADIOLOGY & ADDITIONAL STUDIES:  < from: CT Abdomen and Pelvis w/ IV Cont (21 @ 23:26) >    EXAM:  CT ABDOMEN AND PELVIS IC                            PROCEDURE DATE:  2021          INTERPRETATION:  CLINICAL INFORMATION: Abdominal pain, acute, nonlocalized.    PROCEDURE:  Helical axial images were obtained from the domes of the diaphragm through the pubic symphysis following the administration of intravenous contrast. Coronal and sagittal reformats were also obtained.    CONTRAST/COMPLICATIONS:  IV Contrast: Omnipaque 350  90 cc administered   10 cc discarded  Oral Contrast: NONE  Complications: None reported at time of study completion    COMPARISON: 2019.    FINDINGS:    LOWER CHEST: Subsegmental atelectasis. Previously noted left lower lobe nodule is not well seen on this study.    LIVER: Unremarkable.  GALLBLADDER/BILE DUCTS: No intrahepatic biliary dilatation. Prominent common bile duct, reflecting postcholecystectomy state.  PANCREAS: Unremarkable.  SPLEEN: Unremarkable.    ADRENALS: Unremarkable.  KIDNEYS/URETERS: No hydronephrosis, hydroureter or significantperinephric stranding. Stable indeterminate bilateral renal lesions. Subcentimeter hypodense foci in the left kidney, too small to characterize.  BLADDER: Partially distended.  REPRODUCTIVE ORGANS: Hysterectomy. No adnexal mass.    BOWEL: No bowel obstruction. Unremarkable appendix. Colon diverticulosis. Prominent wall of the distal stomach. Prominent wall of the left colon without inflammatory change. Submucosal fat in the colon.  PERITONEUM: No drainable fluid collection or free air.  VESSELS: Atherosclerosis. Normal caliber of the abdominal aorta.  RETROPERITONEUM: No lymphadenopathy.  ABDOMINAL WALL/SOFT TISSUES: Post surgical changes along the anterior abdominal/pelvic wall soft tissue.  BONES: Degenerative changes of the spine. Stable grade 1 anterolisthesis of L4 on L5 and L5 on S1. Artifact from the L4-S1 posterior spinal fixation device degrading images. Stable anterior wedging of the midthoracic spine. Stable nonspecific small sclerotic foci in the pelvic bones.    IMPRESSION:    No bowel obstruction, drainable fluid collection or intraperitoneal free air. Prominent wall of the left colon without inflammatory change, which may be due to partial distention. Recommend clinical correlation to assess colitis.    Prominent distalgastric wall, which may be due to distention and/or gastritis. Recommend clinical correlation. Follow-up endoscopy may be obtained for further evaluation.    Stable indeterminate bilateral renal lesions, which can be further characterized on a nonemergent MRI if not previously characterized.    Additional findings as described.    --- End of Report ---            TRICE WARNER MD; Attending Radiologist  This document has been electronically signed. Aug 16 2021 11:48PM    < end of copied text >

## 2021-08-18 NOTE — PROGRESS NOTE ADULT - SUBJECTIVE AND OBJECTIVE BOX
CHIEF COMPLAINT/DIAGNOSIS: N/V/D    HPI: 50 year old female patient with history of cyclic vomiting syndrome currently in remission presented to the ED complaining of a 6 day history profound nausea, vomiting and diarrhea associated with diffuse abdominal pain and chills. Patient states she has not been to eat or keep solids or liquids down. Denies any recent travel, fever, trauma, recent abx use. Without urinary symptoms or cough.    In the ED patient with leukocytosis of 20. CT abdomen/pelvis done on 8/16/2021 noted for: No bowel obstruction, drainable fluid collection or intraperitoneal free air. Prominent wall of the left colon without inflammatory change, which may be due to partial distention. Recommend clinical correlation to assess colitis.    Prominent distal gastric wall, which may be due to distention and/or gastritis. Recommend clinical correlation. Follow-up endoscopy may be obtained for further evaluation.    8/18/21: abd pain, N/V, minimal PO intake. intermittent flank pain. reports daily NSAID use.   REVIEW OF SYSTEMS:  All other review of systems is negative unless indicated above    PHYSICAL EXAM:  Constitutional: NAD, awake and alert   HEENT: Normal Hearing, MMM  Neck: Soft and supple   Respiratory: Breath sounds are clear bilaterally   Cardiovascular: S1 and S2, regular rate and rhythm, no Murmurs, gallops or rubs  Gastrointestinal: Bowel Sounds present, soft, tender to palp  Extremities: No peripheral edema  Vascular: 2+ peripheral pulses  Neurological: A/O x 3, no focal deficits  Musculoskeletal: 5/5 strength b/l upper and lower extremities  Skin: No rashes    Vital Signs Last 24 Hrs  T(C): 36.8 (18 Aug 2021 08:09), Max: 37.4 (17 Aug 2021 18:50)  T(F): 98.2 (18 Aug 2021 08:09), Max: 99.4 (17 Aug 2021 18:50)  HR: 59 (18 Aug 2021 08:09) (59 - 67)  BP: 139/67 (18 Aug 2021 08:09) (131/92 - 165/75)  BP(mean): 92 (18 Aug 2021 00:09) (91 - 92)  RR: 18 (18 Aug 2021 08:09) (14 - 19)  SpO2: 95% (18 Aug 2021 08:09) (94% - 98%) -- room air    LABS: All Labs Reviewed:                        12.9   16.35 )-----------( 335      ( 18 Aug 2021 06:45 )             38.6     08-18    138  |  106  |  10  ----------------------------<  98  3.1<L>   |  26  |  0.52    Ca    8.5      18 Aug 2021 06:45    TPro  6.6  /  Alb  3.4  /  TBili  0.6  /  DBili  0.1  /  AST  13<L>  /  ALT  20  /  AlkPhos  67  08-18    PT/INR - ( 16 Aug 2021 20:08 )   PT: 13.2 sec;   INR: 1.14 ratio      PTT - ( 16 Aug 2021 20:08 )  PTT:32.8 sec    Blood Culture: 08-16 @ 22:29  Organism --  Gram Stain Blood -- Gram Stain --  Specimen Source Clean Catch None  Culture-Blood --    08-16 @ 20:08  Organism --  Gram Stain Blood -- Gram Stain --  Specimen Source .Blood None  Culture-Blood --    08-16 @ 22:29  Organism --  Gram Stain Blood -- Gram Stain --  Specimen Source Clean Catch None  Culture-Blood --    08-16 @ 20:08  Organism --  Gram Stain Blood -- Gram Stain --  Specimen Source .Blood None  Culture-Blood --    RADIOLOGY:  < from: CT Abdomen and Pelvis w/ IV Cont (08.16.21 @ 23:26) >  IMPRESSION:  No bowel obstruction, drainable fluid collection or intraperitoneal free air. Prominent wall of the left colon without inflammatory change, which may be due to partial distention. Recommend clinical correlation to assess colitis.  Prominent distal gastric wall, which may be due to distention and/or gastritis. Recommend clinical correlation. Follow-up endoscopy may be obtained for further evaluation.  Stable indeterminate bilateral renal lesions, which can be further characterized on a nonemergent MRI if not previously characterized.  Additional findings as described.  < end of copied text >    MEDICATIONS  (STANDING):  ciprofloxacin   IVPB 400 milliGRAM(s) IV Intermittent every 12 hours  metroNIDAZOLE  IVPB 500 milliGRAM(s) IV Intermittent every 8 hours  nicotine - 21 mG/24Hr(s) Patch 1 patch Transdermal daily  ondansetron Injectable 4 milliGRAM(s) IV Push once  oxyCODONE  ER Tablet 15 milliGRAM(s) Oral every 12 hours  pantoprazole  Injectable 40 milliGRAM(s) IV Push two times a day  potassium chloride    Tablet ER 40 milliEquivalent(s) Oral every 4 hours  sodium chloride 0.9%. 1000 milliLiter(s) (125 mL/Hr) IV Continuous <Continuous>    MEDICATIONS  (PRN):  HYDROmorphone  Injectable 1 milliGRAM(s) IV Push every 6 hours PRN Severe Pain (7 - 10)  LORazepam   Injectable 1 milliGRAM(s) IV Push two times a day PRN Nausea and/or Vomiting  ondansetron Injectable 4 milliGRAM(s) IV Push every 6 hours PRN Nausea and/or Vomiting  oxyCODONE    IR 15 milliGRAM(s) Oral daily PRN Severe Pain (7 - 10)    Home Medications:  oxyCODONE 15 mg oral tablet, extended release: 1 tab(s) orally every 12 hours (17 Aug 2021 22:39)  OxyCONTIN 15 mg oral tablet, extended release: 1 tab(s) orally every 12 hours (17 Aug 2021 22:38)

## 2021-08-18 NOTE — PROGRESS NOTE ADULT - PROBLEM SELECTOR PLAN 1
Persistent abdominal pain, N/V  CT findings above.   Clears Diet - advance diet as tolerated   PPI BID IV  Zofran PRN  Cont. IVF hydration   GI eval Persistent abdominal pain, N/V  CT findings above.   Clears Diet - advance diet as tolerated   PPI BID IV  Zofran PRN  Cont. IVF hydration  Lipase, LFTs wnl  GI eval

## 2021-08-19 LAB
ANION GAP SERPL CALC-SCNC: 5 MMOL/L — SIGNIFICANT CHANGE UP (ref 5–17)
BUN SERPL-MCNC: 6 MG/DL — LOW (ref 7–23)
CALCIUM SERPL-MCNC: 8.7 MG/DL — SIGNIFICANT CHANGE UP (ref 8.5–10.1)
CHLORIDE SERPL-SCNC: 109 MMOL/L — HIGH (ref 96–108)
CO2 SERPL-SCNC: 26 MMOL/L — SIGNIFICANT CHANGE UP (ref 22–31)
CREAT SERPL-MCNC: 0.51 MG/DL — SIGNIFICANT CHANGE UP (ref 0.5–1.3)
GLUCOSE SERPL-MCNC: 104 MG/DL — HIGH (ref 70–99)
HCT VFR BLD CALC: 37.5 % — SIGNIFICANT CHANGE UP (ref 34.5–45)
HGB BLD-MCNC: 12.2 G/DL — SIGNIFICANT CHANGE UP (ref 11.5–15.5)
MAGNESIUM SERPL-MCNC: 2.2 MG/DL — SIGNIFICANT CHANGE UP (ref 1.6–2.6)
MCHC RBC-ENTMCNC: 28.3 PG — SIGNIFICANT CHANGE UP (ref 27–34)
MCHC RBC-ENTMCNC: 32.5 GM/DL — SIGNIFICANT CHANGE UP (ref 32–36)
MCV RBC AUTO: 87 FL — SIGNIFICANT CHANGE UP (ref 80–100)
PLATELET # BLD AUTO: 297 K/UL — SIGNIFICANT CHANGE UP (ref 150–400)
POTASSIUM SERPL-MCNC: 3.6 MMOL/L — SIGNIFICANT CHANGE UP (ref 3.5–5.3)
POTASSIUM SERPL-SCNC: 3.6 MMOL/L — SIGNIFICANT CHANGE UP (ref 3.5–5.3)
RBC # BLD: 4.31 M/UL — SIGNIFICANT CHANGE UP (ref 3.8–5.2)
RBC # FLD: 13.6 % — SIGNIFICANT CHANGE UP (ref 10.3–14.5)
SODIUM SERPL-SCNC: 140 MMOL/L — SIGNIFICANT CHANGE UP (ref 135–145)
WBC # BLD: 12.9 K/UL — HIGH (ref 3.8–10.5)
WBC # FLD AUTO: 12.9 K/UL — HIGH (ref 3.8–10.5)

## 2021-08-19 PROCEDURE — 99232 SBSQ HOSP IP/OBS MODERATE 35: CPT

## 2021-08-19 RX ORDER — LACTOBACILLUS ACIDOPHILUS 100MM CELL
1 CAPSULE ORAL
Refills: 0 | Status: DISCONTINUED | OUTPATIENT
Start: 2021-08-19 | End: 2021-08-21

## 2021-08-19 RX ORDER — SODIUM CHLORIDE 9 MG/ML
1000 INJECTION INTRAMUSCULAR; INTRAVENOUS; SUBCUTANEOUS
Refills: 0 | Status: DISCONTINUED | OUTPATIENT
Start: 2021-08-19 | End: 2021-08-21

## 2021-08-19 RX ADMIN — Medication 200 MILLIGRAM(S): at 10:23

## 2021-08-19 RX ADMIN — SODIUM CHLORIDE 75 MILLILITER(S): 9 INJECTION INTRAMUSCULAR; INTRAVENOUS; SUBCUTANEOUS at 13:43

## 2021-08-19 RX ADMIN — Medication 100 MILLIGRAM(S): at 05:37

## 2021-08-19 RX ADMIN — HYDROMORPHONE HYDROCHLORIDE 1 MILLIGRAM(S): 2 INJECTION INTRAMUSCULAR; INTRAVENOUS; SUBCUTANEOUS at 00:55

## 2021-08-19 RX ADMIN — SODIUM CHLORIDE 125 MILLILITER(S): 9 INJECTION INTRAMUSCULAR; INTRAVENOUS; SUBCUTANEOUS at 04:21

## 2021-08-19 RX ADMIN — OXYCODONE HYDROCHLORIDE 15 MILLIGRAM(S): 5 TABLET ORAL at 17:46

## 2021-08-19 RX ADMIN — HYDROMORPHONE HYDROCHLORIDE 1 MILLIGRAM(S): 2 INJECTION INTRAMUSCULAR; INTRAVENOUS; SUBCUTANEOUS at 07:08

## 2021-08-19 RX ADMIN — OXYCODONE HYDROCHLORIDE 15 MILLIGRAM(S): 5 TABLET ORAL at 22:15

## 2021-08-19 RX ADMIN — Medication 5 MILLIGRAM(S): at 06:21

## 2021-08-19 RX ADMIN — PANTOPRAZOLE SODIUM 40 MILLIGRAM(S): 20 TABLET, DELAYED RELEASE ORAL at 21:56

## 2021-08-19 RX ADMIN — HYDROMORPHONE HYDROCHLORIDE 1 MILLIGRAM(S): 2 INJECTION INTRAMUSCULAR; INTRAVENOUS; SUBCUTANEOUS at 01:20

## 2021-08-19 RX ADMIN — ONDANSETRON 4 MILLIGRAM(S): 8 TABLET, FILM COATED ORAL at 05:37

## 2021-08-19 RX ADMIN — HYDROMORPHONE HYDROCHLORIDE 1 MILLIGRAM(S): 2 INJECTION INTRAMUSCULAR; INTRAVENOUS; SUBCUTANEOUS at 20:15

## 2021-08-19 RX ADMIN — HYDROMORPHONE HYDROCHLORIDE 1 MILLIGRAM(S): 2 INJECTION INTRAMUSCULAR; INTRAVENOUS; SUBCUTANEOUS at 19:07

## 2021-08-19 RX ADMIN — Medication 1 TABLET(S): at 17:46

## 2021-08-19 RX ADMIN — OXYCODONE HYDROCHLORIDE 15 MILLIGRAM(S): 5 TABLET ORAL at 21:55

## 2021-08-19 RX ADMIN — Medication 200 MILLIGRAM(S): at 21:55

## 2021-08-19 RX ADMIN — HYDROMORPHONE HYDROCHLORIDE 1 MILLIGRAM(S): 2 INJECTION INTRAMUSCULAR; INTRAVENOUS; SUBCUTANEOUS at 13:42

## 2021-08-19 RX ADMIN — HYDROMORPHONE HYDROCHLORIDE 1 MILLIGRAM(S): 2 INJECTION INTRAMUSCULAR; INTRAVENOUS; SUBCUTANEOUS at 20:38

## 2021-08-19 RX ADMIN — Medication 100 MILLIGRAM(S): at 21:55

## 2021-08-19 RX ADMIN — Medication 100 MILLIGRAM(S): at 13:42

## 2021-08-19 RX ADMIN — PANTOPRAZOLE SODIUM 40 MILLIGRAM(S): 20 TABLET, DELAYED RELEASE ORAL at 10:23

## 2021-08-19 RX ADMIN — OXYCODONE HYDROCHLORIDE 15 MILLIGRAM(S): 5 TABLET ORAL at 10:23

## 2021-08-19 RX ADMIN — HYDROMORPHONE HYDROCHLORIDE 1 MILLIGRAM(S): 2 INJECTION INTRAMUSCULAR; INTRAVENOUS; SUBCUTANEOUS at 07:28

## 2021-08-19 RX ADMIN — OXYCODONE HYDROCHLORIDE 15 MILLIGRAM(S): 5 TABLET ORAL at 10:30

## 2021-08-19 NOTE — DIETITIAN NUTRITION RISK NOTIFICATION - ADDITIONAL COMMENTS/DIETITIAN RECOMMENDATIONS
· Additional Recommendations  1) Advance po diet when feasible if unable to advance consider alternate route of nutrition to bridge po tolerance. 2) monitor lytes and hydration replete as needed. 3) MVI w/ minerals and Thiamine (most likely depleted 2/2 poor intake).

## 2021-08-19 NOTE — PROGRESS NOTE ADULT - PROBLEM SELECTOR PLAN 2
See plan above  Patient reports normal EGD/ colonoscopy 2 years ago  Endorses daily NSAID use at home/ current smoker
See plan above  Patient reports normal EGD/ colonoscopy 2 years ago  Endorses daily NSAID use at home/ current smoker  GI eval

## 2021-08-19 NOTE — DIETITIAN INITIAL EVALUATION ADULT. - PERTINENT MEDS FT
MEDICATIONS  (STANDING):  ciprofloxacin   IVPB 400 milliGRAM(s) IV Intermittent every 12 hours  lactobacillus acidophilus 1 Tablet(s) Oral two times a day with meals  metroNIDAZOLE  IVPB 500 milliGRAM(s) IV Intermittent every 8 hours  nicotine - 21 mG/24Hr(s) Patch 1 patch Transdermal daily  oxyCODONE  ER Tablet 15 milliGRAM(s) Oral every 12 hours  pantoprazole  Injectable 40 milliGRAM(s) IV Push two times a day  sodium chloride 0.9%. 1000 milliLiter(s) (75 mL/Hr) IV Continuous <Continuous>    MEDICATIONS  (PRN):  HYDROmorphone  Injectable 1 milliGRAM(s) IV Push every 6 hours PRN Severe Pain (7 - 10)  metoclopramide Injectable 5 milliGRAM(s) IV Push every 8 hours PRN N/V  ondansetron Injectable 4 milliGRAM(s) IV Push every 6 hours PRN Nausea and/or Vomiting  oxyCODONE    IR 15 milliGRAM(s) Oral daily PRN Severe Pain (7 - 10)

## 2021-08-19 NOTE — DIETITIAN NUTRITION RISK NOTIFICATION - TREATMENT: THE FOLLOWING DIET HAS BEEN RECOMMENDED
Diet, NPO after Midnight:      NPO Start Date: 19-Aug-2021,   NPO Start Time: 23:59 (08-19-21 @ 11:49) [Active]  Diet, Clear Liquid (08-17-21 @ 21:58) [Active]

## 2021-08-19 NOTE — PROGRESS NOTE ADULT - SUBJECTIVE AND OBJECTIVE BOX
CHIEF COMPLAINT/DIAGNOSIS: N/V/D    HPI: 50 year old female patient with history of cyclic vomiting syndrome currently in remission presented to the ED complaining of a 6 day history profound nausea, vomiting and diarrhea associated with diffuse abdominal pain and chills. Patient states she has not been to eat or keep solids or liquids down. Denies any recent travel, fever, trauma, recent abx use. Without urinary symptoms or cough.    In the ED patient with leukocytosis of 20. CT abdomen/pelvis done on 8/16/2021 noted for: No bowel obstruction, drainable fluid collection or intraperitoneal free air. Prominent wall of the left colon without inflammatory change, which may be due to partial distention. Recommend clinical correlation to assess colitis.    Prominent distal gastric wall, which may be due to distention and/or gastritis. Recommend clinical correlation. Follow-up endoscopy may be obtained for further evaluation.    8/18/21: abd pain, N/V, minimal PO intake. intermittent flank pain. reports daily NSAID use.   REVIEW OF SYSTEMS:  All other review of systems is negative unless indicated above    PHYSICAL EXAM:  Constitutional: NAD, awake and alert   HEENT: Normal Hearing, MMM  Neck: Soft and supple   Respiratory: Breath sounds are clear bilaterally   Cardiovascular: S1 and S2, regular rate and rhythm, no Murmurs, gallops or rubs  Gastrointestinal: Bowel Sounds present, soft, tender to palp  Extremities: No peripheral edema  Vascular: 2+ peripheral pulses  Neurological: A/O x 3, no focal deficits  Musculoskeletal: 5/5 strength b/l upper and lower extremities  Skin: No rashes    Vital Signs Last 24 Hrs  T(C): 36.8 (18 Aug 2021 08:09), Max: 37.4 (17 Aug 2021 18:50)  T(F): 98.2 (18 Aug 2021 08:09), Max: 99.4 (17 Aug 2021 18:50)  HR: 59 (18 Aug 2021 08:09) (59 - 67)  BP: 139/67 (18 Aug 2021 08:09) (131/92 - 165/75)  BP(mean): 92 (18 Aug 2021 00:09) (91 - 92)  RR: 18 (18 Aug 2021 08:09) (14 - 19)  SpO2: 95% (18 Aug 2021 08:09) (94% - 98%) -- room air    LABS: All Labs Reviewed:                        12.9   16.35 )-----------( 335      ( 18 Aug 2021 06:45 )             38.6     08-18    138  |  106  |  10  ----------------------------<  98  3.1<L>   |  26  |  0.52    Ca    8.5      18 Aug 2021 06:45    TPro  6.6  /  Alb  3.4  /  TBili  0.6  /  DBili  0.1  /  AST  13<L>  /  ALT  20  /  AlkPhos  67  08-18    PT/INR - ( 16 Aug 2021 20:08 )   PT: 13.2 sec;   INR: 1.14 ratio      PTT - ( 16 Aug 2021 20:08 )  PTT:32.8 sec    Blood Culture: 08-16 @ 22:29  Organism --  Gram Stain Blood -- Gram Stain --  Specimen Source Clean Catch None  Culture-Blood --    08-16 @ 20:08  Organism --  Gram Stain Blood -- Gram Stain --  Specimen Source .Blood None  Culture-Blood --    08-16 @ 22:29  Organism --  Gram Stain Blood -- Gram Stain --  Specimen Source Clean Catch None  Culture-Blood --    08-16 @ 20:08  Organism --  Gram Stain Blood -- Gram Stain --  Specimen Source .Blood None  Culture-Blood --    RADIOLOGY:  < from: CT Abdomen and Pelvis w/ IV Cont (08.16.21 @ 23:26) >  IMPRESSION:  No bowel obstruction, drainable fluid collection or intraperitoneal free air. Prominent wall of the left colon without inflammatory change, which may be due to partial distention. Recommend clinical correlation to assess colitis.  Prominent distal gastric wall, which may be due to distention and/or gastritis. Recommend clinical correlation. Follow-up endoscopy may be obtained for further evaluation.  Stable indeterminate bilateral renal lesions, which can be further characterized on a nonemergent MRI if not previously characterized.  Additional findings as described.  < end of copied text >    MEDICATIONS  (STANDING):  ciprofloxacin   IVPB 400 milliGRAM(s) IV Intermittent every 12 hours  metroNIDAZOLE  IVPB 500 milliGRAM(s) IV Intermittent every 8 hours  nicotine - 21 mG/24Hr(s) Patch 1 patch Transdermal daily  ondansetron Injectable 4 milliGRAM(s) IV Push once  oxyCODONE  ER Tablet 15 milliGRAM(s) Oral every 12 hours  pantoprazole  Injectable 40 milliGRAM(s) IV Push two times a day  potassium chloride    Tablet ER 40 milliEquivalent(s) Oral every 4 hours  sodium chloride 0.9%. 1000 milliLiter(s) (125 mL/Hr) IV Continuous <Continuous>    MEDICATIONS  (PRN):  HYDROmorphone  Injectable 1 milliGRAM(s) IV Push every 6 hours PRN Severe Pain (7 - 10)  LORazepam   Injectable 1 milliGRAM(s) IV Push two times a day PRN Nausea and/or Vomiting  ondansetron Injectable 4 milliGRAM(s) IV Push every 6 hours PRN Nausea and/or Vomiting  oxyCODONE    IR 15 milliGRAM(s) Oral daily PRN Severe Pain (7 - 10)    Home Medications:  oxyCODONE 15 mg oral tablet, extended release: 1 tab(s) orally every 12 hours (17 Aug 2021 22:39)  OxyCONTIN 15 mg oral tablet, extended release: 1 tab(s) orally every 12 hours (17 Aug 2021 22:38) CHIEF COMPLAINT/DIAGNOSIS: N/V/D    HPI: 50 year old female patient with history of cyclic vomiting syndrome currently in remission presented to the ED complaining of a 6 day history profound nausea, vomiting and diarrhea associated with diffuse abdominal pain and chills. Patient states she has not been to eat or keep solids or liquids down. Denies any recent travel, fever, trauma, recent abx use. Without urinary symptoms or cough.    In the ED patient with leukocytosis of 20. CT abdomen/pelvis done on 8/16/2021 noted for: No bowel obstruction, drainable fluid collection or intraperitoneal free air. Prominent wall of the left colon without inflammatory change, which may be due to partial distention. Recommend clinical correlation to assess colitis.    Prominent distal gastric wall, which may be due to distention and/or gastritis. Recommend clinical correlation. Follow-up endoscopy may be obtained for further evaluation.    8/18/21: abd pain, N/V, minimal PO intake. intermittent flank pain. reports daily NSAID use.   8/19/21: still with amb tenderness/pain, no loose stools this AM, specimens pending  REVIEW OF SYSTEMS:  All other review of systems is negative unless indicated above    PHYSICAL EXAM:  Constitutional: NAD, awake and alert   HEENT: Normal Hearing, MMM  Neck: Soft and supple   Respiratory: Breath sounds are clear bilaterally   Cardiovascular: S1 and S2, regular rate and rhythm, no Murmurs, gallops or rubs  Gastrointestinal: Bowel Sounds present, soft, tender to palp  Extremities: No peripheral edema  Vascular: 2+ peripheral pulses  Neurological: A/O x 3, no focal deficits  Musculoskeletal: 5/5 strength b/l upper and lower extremities  Skin: No rashes    Vital Signs Last 24 Hrs  T(C): 36.8 (18 Aug 2021 08:09), Max: 37.4 (17 Aug 2021 18:50)  T(F): 98.2 (18 Aug 2021 08:09), Max: 99.4 (17 Aug 2021 18:50)  HR: 59 (18 Aug 2021 08:09) (59 - 67)  BP: 139/67 (18 Aug 2021 08:09) (131/92 - 165/75)  BP(mean): 92 (18 Aug 2021 00:09) (91 - 92)  RR: 18 (18 Aug 2021 08:09) (14 - 19)  SpO2: 95% (18 Aug 2021 08:09) (94% - 98%) -- room air    LABS: All Labs Reviewed:                        12.9   16.35 )-----------( 335      ( 18 Aug 2021 06:45 )             38.6     08-18    138  |  106  |  10  ----------------------------<  98  3.1<L>   |  26  |  0.52    Ca    8.5      18 Aug 2021 06:45    TPro  6.6  /  Alb  3.4  /  TBili  0.6  /  DBili  0.1  /  AST  13<L>  /  ALT  20  /  AlkPhos  67  08-18    PT/INR - ( 16 Aug 2021 20:08 )   PT: 13.2 sec;   INR: 1.14 ratio      PTT - ( 16 Aug 2021 20:08 )  PTT:32.8 sec    Blood Culture: 08-16 @ 22:29  Organism --  Gram Stain Blood -- Gram Stain --  Specimen Source Clean Catch None  Culture-Blood --    08-16 @ 20:08  Organism --  Gram Stain Blood -- Gram Stain --  Specimen Source .Blood None  Culture-Blood --    08-16 @ 22:29  Organism --  Gram Stain Blood -- Gram Stain --  Specimen Source Clean Catch None  Culture-Blood --    08-16 @ 20:08  Organism --  Gram Stain Blood -- Gram Stain --  Specimen Source .Blood None  Culture-Blood --    RADIOLOGY:  < from: CT Abdomen and Pelvis w/ IV Cont (08.16.21 @ 23:26) >  IMPRESSION:  No bowel obstruction, drainable fluid collection or intraperitoneal free air. Prominent wall of the left colon without inflammatory change, which may be due to partial distention. Recommend clinical correlation to assess colitis.  Prominent distal gastric wall, which may be due to distention and/or gastritis. Recommend clinical correlation. Follow-up endoscopy may be obtained for further evaluation.  Stable indeterminate bilateral renal lesions, which can be further characterized on a nonemergent MRI if not previously characterized.  Additional findings as described.  < end of copied text >    MEDICATIONS  (STANDING):  ciprofloxacin   IVPB 400 milliGRAM(s) IV Intermittent every 12 hours  metroNIDAZOLE  IVPB 500 milliGRAM(s) IV Intermittent every 8 hours  nicotine - 21 mG/24Hr(s) Patch 1 patch Transdermal daily  ondansetron Injectable 4 milliGRAM(s) IV Push once  oxyCODONE  ER Tablet 15 milliGRAM(s) Oral every 12 hours  pantoprazole  Injectable 40 milliGRAM(s) IV Push two times a day  potassium chloride    Tablet ER 40 milliEquivalent(s) Oral every 4 hours  sodium chloride 0.9%. 1000 milliLiter(s) (125 mL/Hr) IV Continuous <Continuous>    MEDICATIONS  (PRN):  HYDROmorphone  Injectable 1 milliGRAM(s) IV Push every 6 hours PRN Severe Pain (7 - 10)  LORazepam   Injectable 1 milliGRAM(s) IV Push two times a day PRN Nausea and/or Vomiting  ondansetron Injectable 4 milliGRAM(s) IV Push every 6 hours PRN Nausea and/or Vomiting  oxyCODONE    IR 15 milliGRAM(s) Oral daily PRN Severe Pain (7 - 10)    Home Medications:  oxyCODONE 15 mg oral tablet, extended release: 1 tab(s) orally every 12 hours (17 Aug 2021 22:39)  OxyCONTIN 15 mg oral tablet, extended release: 1 tab(s) orally every 12 hours (17 Aug 2021 22:38)

## 2021-08-19 NOTE — DIETITIAN INITIAL EVALUATION ADULT. - OTHER INFO
50y old  Female who presents with a chief complaint of Intractable nausea and vomiting. Abdominal pain (18 Aug 2021). Diarrhea resolved but had more N/V today (8/19). As per GI EGD tomorrow (8/19). F/U with stool studies. Pt remains on clear liquid diet however not tolerating at this time. No significant weight loss since last hospital documented wt x 1 month 220# current weight taken by RD (bed scale on 8/19) 225#. If patient is unable to  tolerate po intake since suboptimal nutrition x 1 1/2 weeks consider alternate route of nutrition if unable to diagnose cause of N/V. NFPE indicates mild muscle/fat wasting. Will continue to monitor and follow up prn. Pt meets criteria for moderate protein/calorie malnutrition in context of acute illness AEB suboptimal nutrition 2/2 altered GI.

## 2021-08-19 NOTE — DIETITIAN INITIAL EVALUATION ADULT. - PERTINENT LABORATORY DATA
08-19    140  |  109<H>  |  6<L>  ----------------------------<  104<H>  3.6   |  26  |  0.51    Ca    8.7      19 Aug 2021 06:15  Mg     2.2     08-19    TPro  6.6  /  Alb  3.4  /  TBili  0.6  /  DBili  0.1  /  AST  13<L>  /  ALT  20  /  AlkPhos  67  08-18  BMI: BMI (kg/m2): 40.2 (08-17-21 @ 19:00)  HbA1c:   Glucose:   BP: 158/78 (08-19-21 @ 09:27) (128/74 - 165/75)  Lipid Panel:

## 2021-08-19 NOTE — PROGRESS NOTE ADULT - PROBLEM SELECTOR PLAN 1
Persistent abdominal pain, N/V  CT findings above.   Clears Diet - advance diet as tolerated   PPI BID IV  Zofran PRN  Cont. IVF hydration  Lipase, LFTs wnl  GI eval -- Plan for EGD in AM keep NPO at Midnight Persistent abdominal pain, N/V  CT findings above.   Clears Diet - advance diet as tolerated   PPI BID IV  Zofran PRN  Cont. IVF hydration  Lipase, LFTs wnl  F/u stool studies. maintain isolation  GI eval -- Plan for EGD in AM keep NPO at Midnight

## 2021-08-19 NOTE — DIETITIAN INITIAL EVALUATION ADULT. - MALNUTRITION
moderate protein/calorie malnutrition in context of acute illness AEB suboptimal nutrition 2/2 altered GI. moderate protein/calorie malnutrition in context of acute illness

## 2021-08-19 NOTE — DIETITIAN INITIAL EVALUATION ADULT. - ADD RECOMMEND
1) Advance po diet when feasible if unable to advance consider alternate route of nutrition to bridge po tolerance. 2) monitor lytes and hydration replete as needed. 3) MVI w/ minerals and Thiamine (most likely depleted 2/2 poor intake).

## 2021-08-19 NOTE — PROGRESS NOTE ADULT - SUBJECTIVE AND OBJECTIVE BOX
Patient is a 50y old  Female who presents with a chief complaint of Intractable nausea and vomiting  Abdominal pain (18 Aug 2021 16:45)      Subective:  Diarrhea resolved but but had more N/V today    PAST MEDICAL & SURGICAL HISTORY:  Fibromyalgia    Anxiety    Arthritis    Breast mass    Cyclical vomiting    Renal stone  left    History of rib fracture  right    H/O umbilical hernia repair  2017    H/O: hysterectomy  2017    S/P   2003, 2004    History of lumbar fusion  2011    Carpal tunnel syndrome of left wrist  2006    Carpal tunnel syndrome of right wrist  2007    History of cholecystectomy  1992    Breast cyst  right  (x2)   2014  (last one)        MEDICATIONS  (STANDING):  ciprofloxacin   IVPB 400 milliGRAM(s) IV Intermittent every 12 hours  metroNIDAZOLE  IVPB 500 milliGRAM(s) IV Intermittent every 8 hours  nicotine - 21 mG/24Hr(s) Patch 1 patch Transdermal daily  oxyCODONE  ER Tablet 15 milliGRAM(s) Oral every 12 hours  pantoprazole  Injectable 40 milliGRAM(s) IV Push two times a day  sodium chloride 0.9%. 1000 milliLiter(s) (125 mL/Hr) IV Continuous <Continuous>    MEDICATIONS  (PRN):  HYDROmorphone  Injectable 1 milliGRAM(s) IV Push every 6 hours PRN Severe Pain (7 - 10)  metoclopramide Injectable 5 milliGRAM(s) IV Push every 8 hours PRN N/V  ondansetron Injectable 4 milliGRAM(s) IV Push every 6 hours PRN Nausea and/or Vomiting  oxyCODONE    IR 15 milliGRAM(s) Oral daily PRN Severe Pain (7 - 10)      REVIEW OF SYSTEMS:    RESPIRATORY: No shortness of breath  CARDIOVASCULAR: No chest pain  All other review of systems is negative unless indicated above.    Vital Signs Last 24 Hrs  T(C): 37.1 (18 Aug 2021 20:10), Max: 37.1 (18 Aug 2021 20:10)  T(F): 98.7 (18 Aug 2021 20:10), Max: 98.7 (18 Aug 2021 20:10)  HR: 61 (18 Aug 2021 20:10) (58 - 61)  BP: 128/74 (18 Aug 2021 20:10) (128/74 - 150/75)  BP(mean): --  RR: 17 (18 Aug 2021 20:10) (17 - 19)  SpO2: 98% (18 Aug 2021 20:10) (95% - 98%)    PHYSICAL EXAM:    Constitutional: NAD, well-developed  Respiratory: CTAB  Cardiovascular: S1 and S2, RRR  Gastrointestinal: BS+, soft, NT/ND  Extremities: No peripheral edema  Psychiatric: Normal mood, normal affect    LABS:                        12.2   12.90 )-----------( 297      ( 19 Aug 2021 06:15 )             37.5     08-19    140  |  109<H>  |  6<L>  ----------------------------<  104<H>  3.6   |  26  |  0.51    Ca    8.7      19 Aug 2021 06:15  Mg     2.2     08-    TPro  6.6  /  Alb  3.4  /  TBili  0.6  /  DBili  0.1  /  AST  13<L>  /  ALT  20  /  AlkPhos  67  08-18      LIVER FUNCTIONS - ( 18 Aug 2021 06:45 )  Alb: 3.4 g/dL / Pro: 6.6 gm/dL / ALK PHOS: 67 U/L / ALT: 20 U/L / AST: 13 U/L / GGT: x             RADIOLOGY & ADDITIONAL STUDIES:

## 2021-08-19 NOTE — PROGRESS NOTE ADULT - NSPROGADDITIONALINFOA_GEN_ALL_CORE
Spoke brenton Perkins () all questions/concerns addressed [phone] 8/18 and 8/19
Spoke brenton Perkins () all questions/concerns addressed [phone]

## 2021-08-20 ENCOUNTER — RESULT REVIEW (OUTPATIENT)
Age: 51
End: 2021-08-20

## 2021-08-20 LAB
ANION GAP SERPL CALC-SCNC: 5 MMOL/L — SIGNIFICANT CHANGE UP (ref 5–17)
BUN SERPL-MCNC: 6 MG/DL — LOW (ref 7–23)
C DIFF BY PCR RESULT: SIGNIFICANT CHANGE UP
C DIFF TOX GENS STL QL NAA+PROBE: SIGNIFICANT CHANGE UP
CALCIUM SERPL-MCNC: 8.4 MG/DL — LOW (ref 8.5–10.1)
CHLORIDE SERPL-SCNC: 110 MMOL/L — HIGH (ref 96–108)
CO2 SERPL-SCNC: 27 MMOL/L — SIGNIFICANT CHANGE UP (ref 22–31)
CREAT SERPL-MCNC: 0.52 MG/DL — SIGNIFICANT CHANGE UP (ref 0.5–1.3)
CULTURE RESULTS: SIGNIFICANT CHANGE UP
GLUCOSE SERPL-MCNC: 96 MG/DL — SIGNIFICANT CHANGE UP (ref 70–99)
HCT VFR BLD CALC: 35.1 % — SIGNIFICANT CHANGE UP (ref 34.5–45)
HGB BLD-MCNC: 11.7 G/DL — SIGNIFICANT CHANGE UP (ref 11.5–15.5)
MCHC RBC-ENTMCNC: 28.5 PG — SIGNIFICANT CHANGE UP (ref 27–34)
MCHC RBC-ENTMCNC: 33.3 GM/DL — SIGNIFICANT CHANGE UP (ref 32–36)
MCV RBC AUTO: 85.6 FL — SIGNIFICANT CHANGE UP (ref 80–100)
PLATELET # BLD AUTO: 272 K/UL — SIGNIFICANT CHANGE UP (ref 150–400)
POTASSIUM SERPL-MCNC: 3.3 MMOL/L — LOW (ref 3.5–5.3)
POTASSIUM SERPL-SCNC: 3.3 MMOL/L — LOW (ref 3.5–5.3)
RBC # BLD: 4.1 M/UL — SIGNIFICANT CHANGE UP (ref 3.8–5.2)
RBC # FLD: 13.4 % — SIGNIFICANT CHANGE UP (ref 10.3–14.5)
SODIUM SERPL-SCNC: 142 MMOL/L — SIGNIFICANT CHANGE UP (ref 135–145)
SPECIMEN SOURCE: SIGNIFICANT CHANGE UP
WBC # BLD: 12.65 K/UL — HIGH (ref 3.8–10.5)
WBC # FLD AUTO: 12.65 K/UL — HIGH (ref 3.8–10.5)

## 2021-08-20 PROCEDURE — 88305 TISSUE EXAM BY PATHOLOGIST: CPT | Mod: 26

## 2021-08-20 PROCEDURE — 99232 SBSQ HOSP IP/OBS MODERATE 35: CPT

## 2021-08-20 PROCEDURE — 88312 SPECIAL STAINS GROUP 1: CPT | Mod: 26

## 2021-08-20 RX ORDER — OXYCODONE HYDROCHLORIDE 5 MG/1
1 TABLET ORAL
Qty: 0 | Refills: 0 | DISCHARGE

## 2021-08-20 RX ORDER — POTASSIUM CHLORIDE 20 MEQ
40 PACKET (EA) ORAL EVERY 4 HOURS
Refills: 0 | Status: COMPLETED | OUTPATIENT
Start: 2021-08-20 | End: 2021-08-20

## 2021-08-20 RX ORDER — HYDROMORPHONE HYDROCHLORIDE 2 MG/ML
2 INJECTION INTRAMUSCULAR; INTRAVENOUS; SUBCUTANEOUS EVERY 4 HOURS
Refills: 0 | Status: DISCONTINUED | OUTPATIENT
Start: 2021-08-20 | End: 2021-08-21

## 2021-08-20 RX ADMIN — SODIUM CHLORIDE 75 MILLILITER(S): 9 INJECTION INTRAMUSCULAR; INTRAVENOUS; SUBCUTANEOUS at 13:16

## 2021-08-20 RX ADMIN — Medication 200 MILLIGRAM(S): at 23:42

## 2021-08-20 RX ADMIN — PANTOPRAZOLE SODIUM 40 MILLIGRAM(S): 20 TABLET, DELAYED RELEASE ORAL at 21:36

## 2021-08-20 RX ADMIN — OXYCODONE HYDROCHLORIDE 15 MILLIGRAM(S): 5 TABLET ORAL at 10:41

## 2021-08-20 RX ADMIN — Medication 40 MILLIEQUIVALENT(S): at 18:30

## 2021-08-20 RX ADMIN — HYDROMORPHONE HYDROCHLORIDE 1 MILLIGRAM(S): 2 INJECTION INTRAMUSCULAR; INTRAVENOUS; SUBCUTANEOUS at 02:15

## 2021-08-20 RX ADMIN — ONDANSETRON 4 MILLIGRAM(S): 8 TABLET, FILM COATED ORAL at 10:45

## 2021-08-20 RX ADMIN — Medication 1 TABLET(S): at 16:48

## 2021-08-20 RX ADMIN — OXYCODONE HYDROCHLORIDE 15 MILLIGRAM(S): 5 TABLET ORAL at 18:31

## 2021-08-20 RX ADMIN — Medication 100 MILLIGRAM(S): at 05:54

## 2021-08-20 RX ADMIN — HYDROMORPHONE HYDROCHLORIDE 2 MILLIGRAM(S): 2 INJECTION INTRAMUSCULAR; INTRAVENOUS; SUBCUTANEOUS at 16:46

## 2021-08-20 RX ADMIN — Medication 1 TABLET(S): at 10:40

## 2021-08-20 RX ADMIN — Medication 100 MILLIGRAM(S): at 21:34

## 2021-08-20 RX ADMIN — HYDROMORPHONE HYDROCHLORIDE 2 MILLIGRAM(S): 2 INJECTION INTRAMUSCULAR; INTRAVENOUS; SUBCUTANEOUS at 21:36

## 2021-08-20 RX ADMIN — Medication 5 MILLIGRAM(S): at 19:53

## 2021-08-20 RX ADMIN — HYDROMORPHONE HYDROCHLORIDE 2 MILLIGRAM(S): 2 INJECTION INTRAMUSCULAR; INTRAVENOUS; SUBCUTANEOUS at 11:01

## 2021-08-20 RX ADMIN — Medication 100 MILLIGRAM(S): at 13:25

## 2021-08-20 RX ADMIN — ONDANSETRON 4 MILLIGRAM(S): 8 TABLET, FILM COATED ORAL at 16:49

## 2021-08-20 RX ADMIN — PANTOPRAZOLE SODIUM 40 MILLIGRAM(S): 20 TABLET, DELAYED RELEASE ORAL at 10:40

## 2021-08-20 RX ADMIN — OXYCODONE HYDROCHLORIDE 15 MILLIGRAM(S): 5 TABLET ORAL at 13:15

## 2021-08-20 RX ADMIN — Medication 40 MILLIEQUIVALENT(S): at 16:46

## 2021-08-20 RX ADMIN — Medication 200 MILLIGRAM(S): at 10:43

## 2021-08-20 NOTE — PROGRESS NOTE ADULT - SUBJECTIVE AND OBJECTIVE BOX
HPI: 50 year old female patient with history of cyclic vomiting syndrome currently in remission presented to the ED complaining of a 6 day history profound nausea, vomiting and diarrhea associated with diffuse abdominal pain and chills. Patient states she has not been to eat or keep solids or liquids down. Denies any recent travel, fever, trauma, recent abx use. Without urinary symptoms or cough.    In the ED patient with leukocytosis of 20. CT abdomen/pelvis done on 8/16/2021 noted for: No bowel obstruction, drainable fluid collection or intraperitoneal free air. Prominent wall of the left colon without inflammatory change, which may be due to partial distention. Recommend clinical correlation to assess colitis.    Patient seen and examined, still in a lot of pain with pain medication not touching her.   Some nausea, no vomiting. Scheduled for endoscopy this am. No chest pain or sob.       REVIEW OF SYSTEMS:  All other review of systems is negative unless indicated above    Vital Signs Last 24 Hrs  T(C): 36.9 (20 Aug 2021 07:38), Max: 36.9 (20 Aug 2021 07:38)  T(F): 98.4 (20 Aug 2021 07:38), Max: 98.4 (20 Aug 2021 07:38)  HR: 52 (20 Aug 2021 07:38) (52 - 64)  BP: 132/69 (20 Aug 2021 07:38) (132/69 - 147/79)  BP(mean): 96 (19 Aug 2021 16:32) (96 - 96)  RR: 19 (20 Aug 2021 07:38) (18 - 19)  SpO2: 97% (20 Aug 2021 07:38) (97% - 99%)    I&O's Summary    19 Aug 2021 07:01  -  20 Aug 2021 07:00  --------------------------------------------------------  IN: 902 mL / OUT: 0 mL / NET: 902 mL      PHYSICAL EXAM:  Constitutional: NAD, awake and alert   HEENT: Normal Hearing, MMM  Neck: Soft and supple   Respiratory: Breath sounds are clear bilaterally   Cardiovascular: S1 and S2, regular rate and rhythm, no Murmurs, gallops or rubs  Gastrointestinal: Bowel Sounds present, soft, tender to palp  Extremities: No peripheral edema  Vascular: 2+ peripheral pulses  Neurological: A/O x 3, no focal deficits  Musculoskeletal: 5/5 strength b/l upper and lower extremities  Skin: No rashes      LABS: All Labs Reviewed:                        11.7   12.65 )-----------( 272      ( 20 Aug 2021 06:13 )             35.1     08-20    142  |  110<H>  |  6<L>  ----------------------------<  96  3.3<L>   |  27  |  0.52    Ca    8.4<L>      20 Aug 2021 06:13  Mg     2.2     08-19

## 2021-08-20 NOTE — PROVIDER CONTACT NOTE (OTHER) - SITUATION
spoke with Fariba, office made aware of consult
spoke with Génesis solano made aware of patient admission. fax is [phone]

## 2021-08-20 NOTE — PROGRESS NOTE ADULT - NUTRITIONAL ASSESSMENT
This patient has been assessed with a concern for Malnutrition and has been determined to have a diagnosis/diagnoses of Moderate protein-calorie malnutrition.    This patient is being managed with:   Diet NPO after Midnight-     NPO Start Date: 19-Aug-2021   NPO Start Time: 23:59  Entered: Aug 19 2021 11:49AM    Diet Clear Liquid-  Entered: Aug 17 2021  9:57PM

## 2021-08-21 ENCOUNTER — TRANSCRIPTION ENCOUNTER (OUTPATIENT)
Age: 51
End: 2021-08-21

## 2021-08-21 VITALS
SYSTOLIC BLOOD PRESSURE: 117 MMHG | OXYGEN SATURATION: 97 % | DIASTOLIC BLOOD PRESSURE: 47 MMHG | HEART RATE: 56 BPM | RESPIRATION RATE: 16 BRPM | TEMPERATURE: 98 F

## 2021-08-21 PROCEDURE — 99232 SBSQ HOSP IP/OBS MODERATE 35: CPT

## 2021-08-21 RX ORDER — LACTOBACILLUS ACIDOPHILUS 100MM CELL
1 CAPSULE ORAL
Qty: 14 | Refills: 0
Start: 2021-08-21 | End: 2021-08-27

## 2021-08-21 RX ORDER — NICOTINE POLACRILEX 2 MG
1 GUM BUCCAL
Qty: 14 | Refills: 0
Start: 2021-08-21 | End: 2021-09-03

## 2021-08-21 RX ORDER — IBUPROFEN 200 MG
1 TABLET ORAL
Qty: 0 | Refills: 0 | DISCHARGE

## 2021-08-21 RX ORDER — PANTOPRAZOLE SODIUM 20 MG/1
1 TABLET, DELAYED RELEASE ORAL
Qty: 14 | Refills: 0
Start: 2021-08-21 | End: 2021-09-03

## 2021-08-21 RX ORDER — CIPROFLOXACIN LACTATE 400MG/40ML
1 VIAL (ML) INTRAVENOUS
Qty: 8 | Refills: 0
Start: 2021-08-21 | End: 2021-08-24

## 2021-08-21 RX ORDER — METRONIDAZOLE 500 MG
1 TABLET ORAL
Qty: 12 | Refills: 0
Start: 2021-08-21 | End: 2021-08-24

## 2021-08-21 RX ADMIN — Medication 1 TABLET(S): at 08:47

## 2021-08-21 RX ADMIN — PANTOPRAZOLE SODIUM 40 MILLIGRAM(S): 20 TABLET, DELAYED RELEASE ORAL at 08:47

## 2021-08-21 RX ADMIN — OXYCODONE HYDROCHLORIDE 15 MILLIGRAM(S): 5 TABLET ORAL at 01:25

## 2021-08-21 RX ADMIN — Medication 200 MILLIGRAM(S): at 08:47

## 2021-08-21 RX ADMIN — OXYCODONE HYDROCHLORIDE 15 MILLIGRAM(S): 5 TABLET ORAL at 13:30

## 2021-08-21 RX ADMIN — Medication 100 MILLIGRAM(S): at 05:17

## 2021-08-21 RX ADMIN — ONDANSETRON 4 MILLIGRAM(S): 8 TABLET, FILM COATED ORAL at 05:15

## 2021-08-21 RX ADMIN — OXYCODONE HYDROCHLORIDE 15 MILLIGRAM(S): 5 TABLET ORAL at 08:46

## 2021-08-21 RX ADMIN — OXYCODONE HYDROCHLORIDE 15 MILLIGRAM(S): 5 TABLET ORAL at 10:29

## 2021-08-21 RX ADMIN — HYDROMORPHONE HYDROCHLORIDE 2 MILLIGRAM(S): 2 INJECTION INTRAMUSCULAR; INTRAVENOUS; SUBCUTANEOUS at 05:16

## 2021-08-21 RX ADMIN — ONDANSETRON 4 MILLIGRAM(S): 8 TABLET, FILM COATED ORAL at 11:41

## 2021-08-21 NOTE — PROGRESS NOTE ADULT - SUBJECTIVE AND OBJECTIVE BOX
CC:  Patient is a 50y old  Female who presents with a chief complaint of Intractable nausea and vomiting  Abdominal pain (20 Aug 2021 15:12)    SUBJECTIVE:     - tolerating diet.  - no appreciable abdominal pain.  - denies NV.    ROS:  all other review of systems are negative unless indicated above.    ciprofloxacin   IVPB 400 milliGRAM(s) IV Intermittent every 12 hours  HYDROmorphone  Injectable 2 milliGRAM(s) IV Push every 4 hours PRN  lactobacillus acidophilus 1 Tablet(s) Oral two times a day with meals  metoclopramide Injectable 5 milliGRAM(s) IV Push every 8 hours PRN  metroNIDAZOLE  IVPB 500 milliGRAM(s) IV Intermittent every 8 hours  nicotine - 21 mG/24Hr(s) Patch 1 patch Transdermal daily  ondansetron Injectable 4 milliGRAM(s) IV Push every 6 hours PRN  oxyCODONE    IR 15 milliGRAM(s) Oral daily PRN  oxyCODONE  ER Tablet 15 milliGRAM(s) Oral every 12 hours  pantoprazole  Injectable 40 milliGRAM(s) IV Push two times a day  sodium chloride 0.9%. 1000 milliLiter(s) IV Continuous <Continuous>    T(C): 36.8 (08-21-21 @ 08:02), Max: 36.9 (08-20-21 @ 23:55)  HR: 56 (08-21-21 @ 08:02) (56 - 74)  BP: 117/47 (08-21-21 @ 08:02) (107/46 - 163/89)  RR: 16 (08-21-21 @ 08:02) (16 - 18)  SpO2: 97% (08-21-21 @ 08:02) (97% - 98%)    Constitutional: NAD.   HEENT: PERRL, EOMI, MMM.  Neck: Soft and supple, No carotid bruit, No JVD  Respiratory: Breath sounds are clear bilaterally, No wheezing, rales or rhonchi  Cardiovascular: S1 and S2, regular rate and rhythm, no murmur, rub or gallop.  Gastrointestinal: Bowel Sounds present, soft, nontender, nondistended, no guarding, no rebound, no mass.  Extremities: No peripheral edema  Vascular: 2+ peripheral pulses  Neurological: A/O x 3, no focal deficits  Musculoskeletal: 5/5 strength b/l upper and lower extremities  Skin:  no visible rashes.                         11.7   12.65 )-----------( 272      ( 20 Aug 2021 06:13 )             35.1       08-20    142  |  110<H>  |  6<L>  ----------------------------<  96  3.3<L>   |  27  |  0.52    Ca    8.4<L>      20 Aug 2021 06:13    < from: CT Abdomen and Pelvis w/ IV Cont (08.16.21 @ 23:26) >    IMPRESSION:    No bowel obstruction, drainable fluid collection or intraperitoneal free air. Prominent wall of the left colon without inflammatory change, which may be due to partial distention. Recommend clinical correlation to assess colitis.    Prominent distalgastric wall, which may be due to distention and/or gastritis. Recommend clinical correlation. Follow-up endoscopy may be obtained for further evaluation.    Stable indeterminate bilateral renal lesions, which can be further characterized on a nonemergent MRI if not previously characterized.    Additional findings as described.    < end of copied text >  < from: Xray Kidney Ureter Bladder (08.17.21 @ 19:29) >  IMPRESSION:  Nonspecific bowel gas pattern    < end of copied text >  < from: 12 Lead ECG (08.17.21 @ 19:10) >  Diagnosis Line Normal sinus rhythmwith sinus arrhythmia  Normal ECG  When compared with ECG of 16-AUG-2021 20:23,  No significant change was found    < end of copied text >

## 2021-08-21 NOTE — DISCHARGE NOTE PROVIDER - NSDCCPCAREPLAN_GEN_ALL_CORE_FT
PRINCIPAL DISCHARGE DIAGNOSIS  Diagnosis: Nausea and vomiting  Assessment and Plan of Treatment:       SECONDARY DISCHARGE DIAGNOSES  Diagnosis: Colitis  Assessment and Plan of Treatment:     Diagnosis: Gastritis  Assessment and Plan of Treatment:     Diagnosis: Abdominal pain  Assessment and Plan of Treatment:

## 2021-08-21 NOTE — DISCHARGE NOTE NURSING/CASE MANAGEMENT/SOCIAL WORK - PATIENT PORTAL LINK FT
You can access the FollowMyHealth Patient Portal offered by [facility] by registering at the following website: [email or web address]. By joining [facility]’s FollowMyHealth portal, you will also be able to view your health information using other applications (apps) compatible with our system.

## 2021-08-21 NOTE — PROGRESS NOTE ADULT - NUTRITIONAL ASSESSMENT
This patient has been assessed with a concern for Malnutrition and has been determined to have a diagnosis/diagnoses of Moderate protein-calorie malnutrition.    This patient is being managed with:   Diet Regular-  Entered: Aug 20 2021  3:12PM

## 2021-08-21 NOTE — PROGRESS NOTE ADULT - ASSESSMENT
Problem/Plan - 1:   Problem: Abdominal pain.  Plan: Persistent abdominal pain, N/V  CT findings reviewed  Clears Diet - advance diet as tolerated   PPI BID IV  Zofran PRN  Cont. IVF hydration  GI eval for endoscopy today  F/u stool studies and cdiff  Pain Control: Adjusted medications of patient comfort  Problem/Plan - 2:  Problem: Gastritis.  Plan: See plan above  Patient reports normal EGD/ colonoscopy 2 years ago  Endorses daily NSAID use at home/ current smoker.     Problem/Plan - 3:  Problem: Colitis.  Plan: Cont. Cipro / Flagyl IV  Leukocytosis improving.     Problem/Plan - 4:   Problem: High dependence on smoking.  Plan: Nicotine patch  smoking cessation advised.     Problem/Plan - 5:  Problem: Hypokalemia.  Plan: Replaced    Problem/Plan - 6:  Problem: DVT prophylaxis. Plan: Ambulate.    
gastritis.  - BCx, UCx and stool studies:  negative.  - EGD:  no significant findings.  - tolerating regular diet.  - continue Protonix 40mg po qd.  - GI f/u outpatient.    colitis.  - continue Cipro + Flagyl x 7 days total.  - GI f/u outpatient.    renal lesions.  - incidental CT findings.  - stable indeterminate b/l renal lesions.  further characterization on nonemergent MR advised.  - KUB:  nonspecific bowel gas pattern.  - patient made aware of above incidental findings.  explained in layman's terms the need for close follow up with her PMD and that additional imaging studies were recommended.  patient acknowledges understanding of incidental findings, was able to repeat back to me in her own words and agrees to follow up with her PMD.     hx tobacco.  - Nicotine patch  - smoking cessation advised.     hypokalemia.    - supplemented on 08/20.    disposition.  - 5S.  - DC home today.    communication.  - 5S RN.  - PMD:  Dr. Durga Gamble.  message left with service.  call back requested.  
Imp:  N/V, pain and diarrhea, but wbc coming down and improving    Rec:  EGD tomorrow. Risks and benefits reviewed  F/U stool studies
none

## 2021-08-21 NOTE — DISCHARGE NOTE NURSING/CASE MANAGEMENT/SOCIAL WORK - NSDCVIVACCINE_GEN_ALL_CORE_FT
Tdap; 03-May-2017 14:18; Edwina Emanuel (RN); [employer]; [id number]; IntraMuscular; Deltoid Right.; 0.5 milliLiter(s); VIS (VIS Published: 09-May-2013, VIS Presented: 03-May-2017);

## 2021-08-21 NOTE — DISCHARGE NOTE PROVIDER - NSDCMRMEDTOKEN_GEN_ALL_CORE_FT
ciprofloxacin 500 mg oral tablet, extended release: 1 tab(s) orally 2 times a day   Flagyl 500 mg oral tablet: 1 tab(s) orally 3 times a day   lactobacillus acidophilus oral capsule: 1 cap(s) orally 2 times a day   metoclopramide 10 mg oral tablet: 1 tab(s) orally 3 times a day, As Needed  nicotine 21 mg/24 hr transdermal film, extended release: 1 patch transdermal once a day   oxyCODONE 15 mg oral tablet: 1 tab(s) orally once a day, As Needed  OxyCONTIN 15 mg oral tablet, extended release: 1 tab(s) orally every 12 hours  Protonix 40 mg oral delayed release tablet: 1 tab(s) orally once a day   Tylenol 325 mg oral tablet: 2 tab(s) orally every 4 hours, As Needed

## 2021-08-21 NOTE — DISCHARGE NOTE NURSING/CASE MANAGEMENT/SOCIAL WORK - NSDCPEFALRISK_GEN_ALL_CORE
For information on Fall & injury Prevention, visit [email or web address]

## 2021-08-21 NOTE — DISCHARGE NOTE PROVIDER - HOSPITAL COURSE
gastritis.  - BCx, UCx and stool studies:  negative.  - EGD:  no significant findings.  - tolerating regular diet.  - continue Protonix 40mg po qd.  - GI f/u outpatient.    colitis.  - continue Cipro + Flagyl x 7 days total.  - GI f/u outpatient.    renal lesions.  - incidental CT findings.  - stable indeterminate b/l renal lesions.  further characterization on nonemergent MR advised.  - KUB:  nonspecific bowel gas pattern.  - patient made aware of above incidental findings.  explained in layman's terms the need for close follow up with her PMD and that additional imaging studies were recommended.  patient acknowledges understanding of incidental findings, was able to repeat back to me in her own words and agrees to follow up with her PMD.     hx tobacco.  - Nicotine patch  - smoking cessation advised.     hypokalemia.    - supplemented on 08/20.    disposition.  - 5S.  - DC home today.    communication.  - 5S RN.  - PMD:  Dr. Durga Gamble.  message left with service.  call back requested.

## 2021-08-21 NOTE — DISCHARGE NOTE PROVIDER - CARE PROVIDER_API CALL
Durga Gamble  FAMILY MEDICINE  [address], [address]  [place] [address]  Phone: [phone]  Fax: [phone]  Follow Up Time: 1-3 days    Gabo Childs)  Gastroenterology; Internal Medicine  [address] [place] [address], [address] [place] [address]  Phone: [phone]  Fax: [phone]  Follow Up Time: 1 month

## 2021-08-21 NOTE — PROGRESS NOTE ADULT - REASON FOR ADMISSION
Intractable nausea and vomiting  Abdominal pain

## 2021-08-25 DIAGNOSIS — K29.70 GASTRITIS, UNSPECIFIED, WITHOUT BLEEDING: ICD-10-CM

## 2021-08-25 DIAGNOSIS — F41.9 ANXIETY DISORDER, UNSPECIFIED: ICD-10-CM

## 2021-08-25 DIAGNOSIS — M79.7 FIBROMYALGIA: ICD-10-CM

## 2021-08-25 DIAGNOSIS — K52.9 NONINFECTIVE GASTROENTERITIS AND COLITIS, UNSPECIFIED: ICD-10-CM

## 2021-08-25 DIAGNOSIS — E44.0 MODERATE PROTEIN-CALORIE MALNUTRITION: ICD-10-CM

## 2021-08-25 DIAGNOSIS — M19.90 UNSPECIFIED OSTEOARTHRITIS, UNSPECIFIED SITE: ICD-10-CM

## 2021-08-25 DIAGNOSIS — E87.6 HYPOKALEMIA: ICD-10-CM

## 2021-08-25 DIAGNOSIS — F17.200 NICOTINE DEPENDENCE, UNSPECIFIED, UNCOMPLICATED: ICD-10-CM

## 2021-08-25 DIAGNOSIS — Z90.49 ACQUIRED ABSENCE OF OTHER SPECIFIED PARTS OF DIGESTIVE TRACT: ICD-10-CM

## 2021-08-25 DIAGNOSIS — Z98.1 ARTHRODESIS STATUS: ICD-10-CM

## 2021-09-17 NOTE — CDI QUERY NOTE - NSCDIOTHERTXTBX_GEN_ALL_CORE_HH
This is a 51 y/o Female who presented with Intractable nausea and vomiting, she was diagnosed with Colitis and treated with  Cipro + Flagyl x 7 days total.     Please specify the type of colitis treated:    A)  Colitis, Bacterial intestinal infection, unspecified type,  treated with  Cipro + Flagyl x 7 days total  B)  Colitis, Bacterial intestinal infection, Other specified type,  treated with  Cipro + Flagyl x 7 days total  C)  Not clinically  significant   D)  Other please specify        Progress Note Adult-Family Medicine Attending [Charted Location: [place] [id number]] [Authored: 21-Aug-2021 14:09]    colitis.  - continue Cipro + Flagyl x 7 days total.  - GI f/u outpatient.      08/17 - 08/21  ciprofloxacin   IVPB 400 milliGRAM(s) IV Intermittent every 12 hours  metroNIDAZOLE  IVPB 500 milliGRAM(s) IV Intermittent every 8 hours

## 2021-10-05 NOTE — CHART NOTE - NSCHARTNOTEFT_GEN_A_CORE
Colitis, Bacterial intestinal infection, unspecified type,  treated with  Cipro + Flagyl x 7 days total

## 2021-10-08 NOTE — ED ADULT NURSE NOTE - CHIEF COMPLAINT QUOTE
Patient calling to discuss the messages going back and forth between her and Dr. Vinson's office.  Please call.   patient's dog bit her on left index finger, bite marks on base of finger, knuckle, and distal phalanx. reports pain to finger, pain worse with movement. last tdap last year.

## 2021-10-12 ENCOUNTER — APPOINTMENT (OUTPATIENT)
Dept: DISASTER EMERGENCY | Facility: CLINIC | Age: 51
End: 2021-10-12

## 2021-10-12 ENCOUNTER — LABORATORY RESULT (OUTPATIENT)
Age: 51
End: 2021-10-12

## 2021-10-12 DIAGNOSIS — Z01.818 ENCOUNTER FOR OTHER PREPROCEDURAL EXAMINATION: ICD-10-CM

## 2021-10-14 ENCOUNTER — APPOINTMENT (OUTPATIENT)
Dept: GASTROENTEROLOGY | Facility: HOSPITAL | Age: 51
End: 2021-10-14

## 2021-10-14 ENCOUNTER — OUTPATIENT (OUTPATIENT)
Dept: INPATIENT UNIT | Facility: HOSPITAL | Age: 51
LOS: 1 days | Discharge: HOME | End: 2021-10-14
Payer: COMMERCIAL

## 2021-10-14 VITALS — OXYGEN SATURATION: 95 % | HEART RATE: 68 BPM | RESPIRATION RATE: 17 BRPM | TEMPERATURE: 98 F

## 2021-10-14 VITALS
DIASTOLIC BLOOD PRESSURE: 62 MMHG | OXYGEN SATURATION: 97 % | RESPIRATION RATE: 16 BRPM | WEIGHT: 230.6 LBS | SYSTOLIC BLOOD PRESSURE: 137 MMHG | HEART RATE: 76 BPM | TEMPERATURE: 98 F | HEIGHT: 63 IN

## 2021-10-14 DIAGNOSIS — Z98.890 OTHER SPECIFIED POSTPROCEDURAL STATES: Chronic | ICD-10-CM

## 2021-10-14 DIAGNOSIS — N60.09 SOLITARY CYST OF UNSPECIFIED BREAST: Chronic | ICD-10-CM

## 2021-10-14 DIAGNOSIS — R10.84 GENERALIZED ABDOMINAL PAIN: ICD-10-CM

## 2021-10-14 DIAGNOSIS — G56.02 CARPAL TUNNEL SYNDROME, LEFT UPPER LIMB: Chronic | ICD-10-CM

## 2021-10-14 DIAGNOSIS — Z98.891 HISTORY OF UTERINE SCAR FROM PREVIOUS SURGERY: Chronic | ICD-10-CM

## 2021-10-14 DIAGNOSIS — G56.01 CARPAL TUNNEL SYNDROME, RIGHT UPPER LIMB: Chronic | ICD-10-CM

## 2021-10-14 DIAGNOSIS — Z98.1 ARTHRODESIS STATUS: Chronic | ICD-10-CM

## 2021-10-14 DIAGNOSIS — Z90.49 ACQUIRED ABSENCE OF OTHER SPECIFIED PARTS OF DIGESTIVE TRACT: Chronic | ICD-10-CM

## 2021-10-14 DIAGNOSIS — Z90.710 ACQUIRED ABSENCE OF BOTH CERVIX AND UTERUS: Chronic | ICD-10-CM

## 2021-10-14 PROCEDURE — 43259 EGD US EXAM DUODENUM/JEJUNUM: CPT

## 2021-10-14 PROCEDURE — 43254 EGD ENDO MUCOSAL RESECTION: CPT

## 2021-10-14 RX ORDER — SODIUM CHLORIDE 9 MG/ML
1000 INJECTION, SOLUTION INTRAVENOUS
Refills: 0 | Status: DISCONTINUED | OUTPATIENT
Start: 2021-10-14 | End: 2021-10-14

## 2021-10-14 RX ORDER — FENTANYL CITRATE 50 UG/ML
50 INJECTION INTRAVENOUS
Refills: 0 | Status: DISCONTINUED | OUTPATIENT
Start: 2021-10-14 | End: 2021-10-14

## 2021-10-14 RX ORDER — ONDANSETRON 8 MG/1
4 TABLET, FILM COATED ORAL ONCE
Refills: 0 | Status: DISCONTINUED | OUTPATIENT
Start: 2021-10-14 | End: 2021-10-14

## 2021-10-14 RX ADMIN — SODIUM CHLORIDE 75 MILLILITER(S): 9 INJECTION, SOLUTION INTRAVENOUS at 12:52

## 2021-10-22 DIAGNOSIS — R93.5 ABNORMAL FINDINGS ON DIAGNOSTIC IMAGING OF OTHER ABDOMINAL REGIONS, INCLUDING RETROPERITONEUM: ICD-10-CM

## 2021-10-22 DIAGNOSIS — K44.9 DIAPHRAGMATIC HERNIA WITHOUT OBSTRUCTION OR GANGRENE: ICD-10-CM

## 2021-10-22 DIAGNOSIS — F17.200 NICOTINE DEPENDENCE, UNSPECIFIED, UNCOMPLICATED: ICD-10-CM

## 2021-11-24 ENCOUNTER — EMERGENCY (EMERGENCY)
Facility: HOSPITAL | Age: 51
LOS: 1 days | Discharge: HOME | End: 2021-11-24
Attending: STUDENT IN AN ORGANIZED HEALTH CARE EDUCATION/TRAINING PROGRAM
Payer: COMMERCIAL

## 2021-11-24 ENCOUNTER — EMERGENCY (EMERGENCY)
Facility: HOSPITAL | Age: 51
LOS: 0 days | Discharge: HOME | End: 2021-11-24
Attending: HOSPITALIST
Payer: COMMERCIAL

## 2021-11-24 VITALS
SYSTOLIC BLOOD PRESSURE: 144 MMHG | HEART RATE: 87 BPM | OXYGEN SATURATION: 100 % | DIASTOLIC BLOOD PRESSURE: 84 MMHG | TEMPERATURE: 98 F | RESPIRATION RATE: 17 BRPM

## 2021-11-24 VITALS
SYSTOLIC BLOOD PRESSURE: 155 MMHG | DIASTOLIC BLOOD PRESSURE: 91 MMHG | WEIGHT: 229.94 LBS | HEIGHT: 63 IN | HEART RATE: 100 BPM | OXYGEN SATURATION: 98 % | RESPIRATION RATE: 18 BRPM | TEMPERATURE: 98 F

## 2021-11-24 VITALS
HEART RATE: 85 BPM | RESPIRATION RATE: 14 BRPM | TEMPERATURE: 98 F | DIASTOLIC BLOOD PRESSURE: 79 MMHG | SYSTOLIC BLOOD PRESSURE: 132 MMHG | OXYGEN SATURATION: 97 %

## 2021-11-24 VITALS — WEIGHT: 229.94 LBS | HEIGHT: 63 IN

## 2021-11-24 DIAGNOSIS — G56.01 CARPAL TUNNEL SYNDROME, RIGHT UPPER LIMB: Chronic | ICD-10-CM

## 2021-11-24 DIAGNOSIS — Z87.442 PERSONAL HISTORY OF URINARY CALCULI: ICD-10-CM

## 2021-11-24 DIAGNOSIS — F41.9 ANXIETY DISORDER, UNSPECIFIED: ICD-10-CM

## 2021-11-24 DIAGNOSIS — Z98.891 HISTORY OF UTERINE SCAR FROM PREVIOUS SURGERY: Chronic | ICD-10-CM

## 2021-11-24 DIAGNOSIS — N60.09 SOLITARY CYST OF UNSPECIFIED BREAST: Chronic | ICD-10-CM

## 2021-11-24 DIAGNOSIS — Z98.890 OTHER SPECIFIED POSTPROCEDURAL STATES: Chronic | ICD-10-CM

## 2021-11-24 DIAGNOSIS — M19.90 UNSPECIFIED OSTEOARTHRITIS, UNSPECIFIED SITE: ICD-10-CM

## 2021-11-24 DIAGNOSIS — G56.02 CARPAL TUNNEL SYNDROME, LEFT UPPER LIMB: Chronic | ICD-10-CM

## 2021-11-24 DIAGNOSIS — Z90.49 ACQUIRED ABSENCE OF OTHER SPECIFIED PARTS OF DIGESTIVE TRACT: ICD-10-CM

## 2021-11-24 DIAGNOSIS — Z87.39 PERSONAL HISTORY OF OTHER DISEASES OF THE MUSCULOSKELETAL SYSTEM AND CONNECTIVE TISSUE: ICD-10-CM

## 2021-11-24 DIAGNOSIS — Z90.49 ACQUIRED ABSENCE OF OTHER SPECIFIED PARTS OF DIGESTIVE TRACT: Chronic | ICD-10-CM

## 2021-11-24 DIAGNOSIS — Z98.1 ARTHRODESIS STATUS: Chronic | ICD-10-CM

## 2021-11-24 DIAGNOSIS — Z90.710 ACQUIRED ABSENCE OF BOTH CERVIX AND UTERUS: Chronic | ICD-10-CM

## 2021-11-24 DIAGNOSIS — Z90.710 ACQUIRED ABSENCE OF BOTH CERVIX AND UTERUS: ICD-10-CM

## 2021-11-24 DIAGNOSIS — K08.89 OTHER SPECIFIED DISORDERS OF TEETH AND SUPPORTING STRUCTURES: ICD-10-CM

## 2021-11-24 DIAGNOSIS — Z88.5 ALLERGY STATUS TO NARCOTIC AGENT: ICD-10-CM

## 2021-11-24 DIAGNOSIS — Z98.890 OTHER SPECIFIED POSTPROCEDURAL STATES: ICD-10-CM

## 2021-11-24 DIAGNOSIS — K05.219 AGGRESSIVE PERIODONTITIS, LOCALIZED, UNSPECIFIED SEVERITY: ICD-10-CM

## 2021-11-24 DIAGNOSIS — K12.2 CELLULITIS AND ABSCESS OF MOUTH: ICD-10-CM

## 2021-11-24 LAB
ALBUMIN SERPL ELPH-MCNC: 3.7 G/DL — SIGNIFICANT CHANGE UP (ref 3.3–5)
ALP SERPL-CCNC: 81 U/L — SIGNIFICANT CHANGE UP (ref 40–120)
ALT FLD-CCNC: 20 U/L — SIGNIFICANT CHANGE UP (ref 12–78)
ANION GAP SERPL CALC-SCNC: 2 MMOL/L — LOW (ref 5–17)
AST SERPL-CCNC: 12 U/L — LOW (ref 15–37)
BASOPHILS # BLD AUTO: 0.05 K/UL — SIGNIFICANT CHANGE UP (ref 0–0.2)
BASOPHILS NFR BLD AUTO: 0.3 % — SIGNIFICANT CHANGE UP (ref 0–2)
BILIRUB SERPL-MCNC: 0.4 MG/DL — SIGNIFICANT CHANGE UP (ref 0.2–1.2)
BUN SERPL-MCNC: 6 MG/DL — LOW (ref 7–23)
CALCIUM SERPL-MCNC: 9.2 MG/DL — SIGNIFICANT CHANGE UP (ref 8.5–10.1)
CHLORIDE SERPL-SCNC: 108 MMOL/L — SIGNIFICANT CHANGE UP (ref 96–108)
CO2 SERPL-SCNC: 29 MMOL/L — SIGNIFICANT CHANGE UP (ref 22–31)
CREAT SERPL-MCNC: 0.6 MG/DL — SIGNIFICANT CHANGE UP (ref 0.5–1.3)
EOSINOPHIL # BLD AUTO: 0.2 K/UL — SIGNIFICANT CHANGE UP (ref 0–0.5)
EOSINOPHIL NFR BLD AUTO: 1.3 % — SIGNIFICANT CHANGE UP (ref 0–6)
GLUCOSE SERPL-MCNC: 115 MG/DL — HIGH (ref 70–99)
HCT VFR BLD CALC: 42 % — SIGNIFICANT CHANGE UP (ref 34.5–45)
HGB BLD-MCNC: 13.5 G/DL — SIGNIFICANT CHANGE UP (ref 11.5–15.5)
IMM GRANULOCYTES NFR BLD AUTO: 0.5 % — SIGNIFICANT CHANGE UP (ref 0–1.5)
LYMPHOCYTES # BLD AUTO: 15.3 % — SIGNIFICANT CHANGE UP (ref 13–44)
LYMPHOCYTES # BLD AUTO: 2.33 K/UL — SIGNIFICANT CHANGE UP (ref 1–3.3)
MCHC RBC-ENTMCNC: 28.8 PG — SIGNIFICANT CHANGE UP (ref 27–34)
MCHC RBC-ENTMCNC: 32.1 GM/DL — SIGNIFICANT CHANGE UP (ref 32–36)
MCV RBC AUTO: 89.6 FL — SIGNIFICANT CHANGE UP (ref 80–100)
MONOCYTES # BLD AUTO: 0.96 K/UL — HIGH (ref 0–0.9)
MONOCYTES NFR BLD AUTO: 6.3 % — SIGNIFICANT CHANGE UP (ref 2–14)
NEUTROPHILS # BLD AUTO: 11.63 K/UL — HIGH (ref 1.8–7.4)
NEUTROPHILS NFR BLD AUTO: 76.3 % — SIGNIFICANT CHANGE UP (ref 43–77)
PLATELET # BLD AUTO: 343 K/UL — SIGNIFICANT CHANGE UP (ref 150–400)
POTASSIUM SERPL-MCNC: 3.9 MMOL/L — SIGNIFICANT CHANGE UP (ref 3.5–5.3)
POTASSIUM SERPL-SCNC: 3.9 MMOL/L — SIGNIFICANT CHANGE UP (ref 3.5–5.3)
PROT SERPL-MCNC: 7.4 GM/DL — SIGNIFICANT CHANGE UP (ref 6–8.3)
RBC # BLD: 4.69 M/UL — SIGNIFICANT CHANGE UP (ref 3.8–5.2)
RBC # FLD: 14 % — SIGNIFICANT CHANGE UP (ref 10.3–14.5)
SODIUM SERPL-SCNC: 139 MMOL/L — SIGNIFICANT CHANGE UP (ref 135–145)
WBC # BLD: 15.24 K/UL — HIGH (ref 3.8–10.5)
WBC # FLD AUTO: 15.24 K/UL — HIGH (ref 3.8–10.5)

## 2021-11-24 PROCEDURE — 99284 EMERGENCY DEPT VISIT MOD MDM: CPT | Mod: 25

## 2021-11-24 PROCEDURE — 81025 URINE PREGNANCY TEST: CPT

## 2021-11-24 PROCEDURE — 96375 TX/PRO/DX INJ NEW DRUG ADDON: CPT

## 2021-11-24 PROCEDURE — 36415 COLL VENOUS BLD VENIPUNCTURE: CPT

## 2021-11-24 PROCEDURE — 99285 EMERGENCY DEPT VISIT HI MDM: CPT

## 2021-11-24 PROCEDURE — 99282 EMERGENCY DEPT VISIT SF MDM: CPT

## 2021-11-24 PROCEDURE — 80053 COMPREHEN METABOLIC PANEL: CPT

## 2021-11-24 PROCEDURE — 70487 CT MAXILLOFACIAL W/DYE: CPT | Mod: 26,MA

## 2021-11-24 PROCEDURE — 85025 COMPLETE CBC W/AUTO DIFF WBC: CPT

## 2021-11-24 PROCEDURE — 70487 CT MAXILLOFACIAL W/DYE: CPT | Mod: MA

## 2021-11-24 PROCEDURE — 41800 DRAINAGE OF GUM LESION: CPT

## 2021-11-24 PROCEDURE — 96374 THER/PROPH/DIAG INJ IV PUSH: CPT | Mod: XU

## 2021-11-24 RX ORDER — KETOROLAC TROMETHAMINE 30 MG/ML
30 SYRINGE (ML) INJECTION ONCE
Refills: 0 | Status: DISCONTINUED | OUTPATIENT
Start: 2021-11-24 | End: 2021-11-24

## 2021-11-24 RX ORDER — AMPICILLIN SODIUM AND SULBACTAM SODIUM 250; 125 MG/ML; MG/ML
3 INJECTION, POWDER, FOR SUSPENSION INTRAMUSCULAR; INTRAVENOUS ONCE
Refills: 0 | Status: COMPLETED | OUTPATIENT
Start: 2021-11-24 | End: 2021-11-24

## 2021-11-24 RX ADMIN — AMPICILLIN SODIUM AND SULBACTAM SODIUM 200 GRAM(S): 250; 125 INJECTION, POWDER, FOR SUSPENSION INTRAMUSCULAR; INTRAVENOUS at 18:26

## 2021-11-24 RX ADMIN — Medication 30 MILLIGRAM(S): at 18:26

## 2021-11-24 NOTE — ED STATDOCS - PATIENT PORTAL LINK FT
You can access the FollowMyHealth Patient Portal offered by [facility] by registering at the following website: [email or web address]. By joining [employer]’s FollowMyHealth portal, you will also be able to view your health information using other applications (apps) compatible with our system.

## 2021-11-24 NOTE — ED STATDOCS - ENMT, MLM
Nasal mucosa clear.  Mouth with normal mucosa  Throat has no vesicles, no oropharyngeal exudates and uvula is midline. +numerous missign teeth, tenderness with swelling over the L upper gingiva, molars, selling to L side of face with swelling under L eye.

## 2021-11-24 NOTE — ED ADULT NURSE NOTE - OBJECTIVE STATEMENT
Pt presents to ED for left sided facial pain/swelling x2days. Pt states "It started with pain and swelling to my left lower jaw and now under the left eyelid. It's very tender to touch." Redness and swelling observed under the left eyelid. Denies headaches, fevers, dizziness at this time. IV established in left arm with a 20G, labs drawn and sent, call bell in reach, warm blanket provided, bed in lowest position, side rails up x2, MD evaluation in progress. Will continue to monitor.

## 2021-11-24 NOTE — ED PROVIDER NOTE - PATIENT PORTAL LINK FT
You can access the FollowMyHealth Patient Portal offered by [facility] by registering at the following website: [email or web address]. By joining [employer]’s FollowMyHealth portal, you will also be able to view your health information using other applications (apps) compatible with our system.

## 2021-11-24 NOTE — ED STATDOCS - OBJECTIVE STATEMENT
52 y/o female presents to the ED c/o toothache x 2 days that has resulted in facial pain and swelling.

## 2021-11-24 NOTE — ED ADULT TRIAGE NOTE - CHIEF COMPLAINT QUOTE
facial abscess, seen at [facility] and told to come to [facility] to be seen

## 2021-11-24 NOTE — ED ADULT NURSE NOTE - CHIEF COMPLAINT QUOTE
facial abscess, seen at [facility] and told to come to [facility] to be seen

## 2021-11-24 NOTE — ED PROVIDER NOTE - RAPID ASSESSMENT
51 F sent from [facility] ER for Left upper periapical dental abscess and cellulitis for drainage. Endorsed pain and swelling for x2 days. Pt with L cheek swelling.     Scribe Statement: Leda DUNCAN Tiffany, attest that this documentation has been prepared under the direction and in the presence of Blayne Ch) 51 F sent from [facility] ER for Left upper periapical dental abscess and cellulitis for drainage. Endorsed pain and swelling for x2 days. Pt with L cheek swelling.     Scribe Statement: I, Arlyn Tompkins, attest that this documentation has been prepared under the direction and in the presence of Blayne Ch)  I, Dr. Ch, personally performed the service described in the documentation recorded by the scribe in my presence, and it accurately and completely records my words and actions.

## 2021-11-24 NOTE — ED STATDOCS - NS_ ATTENDINGSCRIBEDETAILS _ED_A_ED_FT
Claire Saha MD: The history, relevant review of systems, past medical and surgical history, medical decision making, and physical examination was documented by the scribe in my presence and I attest to the accuracy of the documentation.

## 2021-11-24 NOTE — ED STATDOCS - PROGRESS NOTE DETAILS
50 yo female with a PMH of anxiety, fibromyalgia presents with L facial swelling x 2 days. Pt with poor dental hygiene and had pain and small area of swelling to the L upper gingiva. Will check with CT, IV abx, and reeval. -John Pruett PA-C CT with small peridontal abscess and cellulitis. Pt was given unasyn and will give rx for augmentin and advised to go to [facility] or [place] for drainage of the abscess. Pt aware and agrees with plan. -John Pruett PA-C

## 2021-11-24 NOTE — ED PROVIDER NOTE - PHYSICAL EXAMINATION
Gen: NAD, AOx3  Head: NCAT  HEENT: PERRL, oral mucosa moist, normal conjunctiva.  Mild left facial swelling, visible periapical abscess above left upper canine.  oropharynx clear.    MSK: No edema, no visible deformities  Neuro: No focal neurologic deficits, normal gait  Skin: No rash   Psych: normal affect

## 2021-11-24 NOTE — ED PROVIDER NOTE - PROGRESS NOTE DETAILS
abscess drained by dental.  Pt is feeling much better.  Abx already sent to pharmacy.  Pt will be discharged and knows to follow up in dental clinic on Monday for drain removal.  - Airam Brizuela,

## 2021-11-24 NOTE — ED PROVIDER NOTE - NSFOLLOWUPINSTRUCTIONS_ED_ALL_ED_FT
- A prescription has been sent to your pharmacy - please take as directed     - Take Tylenol 650mg every 6 hrs as needed for pain.     - Take Motrin 400-600mg every 6 hrs as needed for pain. Take with food     - Follow up n the Dental office in Monday for removal of the drain    - Advance activity as tolerated.  Continue all previously prescribed medications as directed unless otherwise instructed.  Follow up with your primary care physician in 48-72 hours- bring copies of your results.  Return to the ER for worsening or persistent symptoms, and/or ANY NEW OR CONCERNING SYMPTOMS. If you have issues obtaining follow up, please call: [phone] to obtain a doctor or specialist who takes your insurance in your area.  You may call [phone] to make an appointment with the internal medicine clinic.

## 2021-11-24 NOTE — ED ADULT NURSE NOTE - OBJECTIVE STATEMENT
PT A&Ox4, ambulatory with steady gait. Pt presented to ED from [facility] for dental abscess drainage. Known hx anxiety, fibromyalgia, arthritis. PT initially presented to [place] for left facial pain and swelling. Was told she had a dental abscess. Received antibiotics while there and told to come here for drainage. Endorses pain at the site, was given Toradol while there with minimal relief. Able to swallow without diffidcutly and no airway involvement. Denies CP, SOB, N/V/D, dizziness, LOC, weakness, numbness, tingling, fever, chills, open wound or drainage.

## 2021-11-24 NOTE — ED STATDOCS - NSFOLLOWUPINSTRUCTIONS_ED_ALL_ED_FT
Go to either ER to have the abscess drained.   Take the antibiotics as directed.      [facility]  Address: [address] [place] [address]  Phone: [phone]    [facility]  Address: [address] [place] [address] [place] [address]  Phone: [phone]      Dental Abscess    WHAT YOU NEED TO KNOW:    What is a dental abscess? A dental abscess is a collection of pus in or around a tooth. A dental abscess is caused by bacteria. The bacteria can enter the tooth when the enamel (outer part of the tooth) is damaged by tooth decay. Bacteria can also enter the tooth through a chip in the tooth or a cut in the gum. Food particles that are stuck between the teeth for a long time may also lead to an abscess.     Dental Abscess         What increases my risk for a dental abscess?   •Poor tooth care      •Medical conditions, such as diabetes, gastric reflux, or diseases that weaken the immune system       •Procedures on the tooth or the gums      •Dry mouth or very little saliva       •Smoking or drinking alcohol      •Radiation therapy of the head and neck      •Certain medicines, such as steroids, allergy, or blood pressure medicines      What are the signs and symptoms of a dental abscess?   •Toothache, a loose tooth, or a tooth that is very sensitive to pressure or temperature      •Bad breath, unpleasant taste, and drooling      •Fever      •Pain, redness, and swelling of the gums, or swelling of your face and neck      •Pain when you open or close your mouth      •Trouble opening your mouth      How is a dental abscess diagnosed? Your healthcare provider will examine your teeth and gums. He or she will check for pus, redness, swelling, or a mass. You may need an x-ray to check for infection in deeper tissues or broken teeth.     How is a dental abscess treated?   •Medicines may be given to treat a bacterial infection and decrease pain.       •Incision and drainage is a cut in the abscess to allow the pus to drain. A sample of fluid may be collected from your abscess. The fluid is sent to a lab and tested for bacteria. Ask your healthcare provider for more information.      •A root canal is a procedure to remove the bacteria and prevent more infection. It is usually done after an incision and drainage. A filling or crown will be placed over the tooth after you have healed from your root canal.       •Tooth removal may be needed if the infection affects deeper tissues. This is usually done after an incision and drainage.       How can I manage my symptoms?   •Rinse your mouth every 2 hours with salt water. This will help keep the area clean.       •Gently brush your teeth twice a day with a soft tooth brush. This will help keep the area clean.       •Eat soft foods as directed. Soft foods may cause less pain. Examples include applesauce, yogurt, and cooked pasta. Ask your healthcare provider how long to follow this instruction.       •Apply a warm compress to your tooth or gum. Use a cotton ball or gauze soaked in warm water. Remove the compress in 10 minutes or when it becomes cool. Repeat 3 times a day.       What can I do to prevent another dental abscess?   •Brush your teeth at least 2 times a day with fluoride toothpaste.      •Use dental floss at least once a day to clean between your teeth.      •Rinse your mouth with water or mouthwash after meals and snacks. Chew sugarless gum.      •Avoid sugary and starchy food that can stick between your teeth. Limit drinks high in sugar, such as soda or fruit juice.      •See your dentist every 6 months for dental cleanings and oral exams.      When should I seek immediate care?   •You have severe pain in your tooth or jaw.      •You have trouble breathing because of pain or swelling.      When should I call my doctor or dentist?   •Your symptoms get worse, even after treatment.      •Your mouth is bleeding.      •You cannot eat or drink because of pain or swelling.      •Your abscess returns.      •You have an injury that causes a crack in your tooth.      •You have questions or concerns about your condition or care.

## 2021-11-24 NOTE — ED ADULT TRIAGE NOTE - CHIEF COMPLAINT QUOTE
left upper toothache x 2 days. took tylenol with no relief. Woke up this morning with left sided facial swelling. Denies vision changes, diff swallowing, fever/chills and toothache.

## 2021-11-25 NOTE — CONSULT NOTE ADULT - SUBJECTIVE AND OBJECTIVE BOX
Patient is a 51y old  Female who presents with a chief complaint of facial swelling and pain on upper left.       PAST MEDICAL & SURGICAL HISTORY:  Fibromyalgia    Anxiety    Arthritis    Breast mass    Cyclical vomiting    Renal stone  left    History of rib fracture  right    H/O umbilical hernia repair  2017    H/O: hysterectomy  2017    S/P   , 2004    History of lumbar fusion  2011    Carpal tunnel syndrome of left wrist  2006    Carpal tunnel syndrome of right wrist  2007    History of cholecystectomy  1992    Breast cyst  right  (x2)   2014  (last one)        Allergies    No Known Allergies    Intolerances    morphine (Other)      Vital Signs Last 24 Hrs  T(C): 36.6 (2021 23:37), Max: 36.9 (2021 16:53)  T(F): 97.9 (2021 23:37), Max: 98.5 (2021 16:53)  HR: 87 (2021 23:37) (85 - 100)  BP: 144/84 (2021 23:37) (132/79 - 155/91)  BP(mean): 93 (2021 16:53) (93 - 93)  RR: 17 (2021 23:37) (14 - 18)  SpO2: 100% (2021 23:37) (97% - 100%)    EOE:               Mandible FROM             Facial bones and MOM grossly intact             ( -  ) trismus             (  - ) LAD             (  + ) swelling - slight swelling and erythema of left side of face from below orbit to mandible             ( +  ) asymmetry - left side of face slightly larger than right side of face             (  + ) palpation - tender to palpation around left maxillary sinus area to left mandible              (  - ) SOB             (  - ) dysphagia             (  - ) LOC    IOE:  permanent dentition: grossly intact with multiple missing teeth           hard/soft palate: WNL           tongue/FOM WNL           labial/buccal mucosa WNL           ( +  ) palpation            (  + ) swelling - draining, fluctuant abscess apical to #11      Radiographs: 1 pan and PA of #11 taken and interpreted: #11 root tip with gross caries; periapical radiolucency noted on #11; #6 and #21 root tips; no other pathology noted    LABS:                        13.5   15.24 )-----------( 343      ( 2021 18:04 )             42.0     11-24    139  |  108  |  6<L>  ----------------------------<  115<H>  3.9   |  29  |  0.60    Ca    9.2      2021 18:04    TPro  7.4  /  Alb  3.7  /  TBili  0.4  /  DBili  x   /  AST  12<L>  /  ALT  20  /  AlkPhos  81      WBC Count: 15.24 K/uL *H* [3.80 - 10.50] ( @ 18:04)    Platelet Count - Automated: 343 K/uL [150 - 400] ( @ 18:04)            ASSESSMENT: Fluctuant, draining swelling apical to #11, requiring incision and drainage.     PROCEDURE:  Verbal and written consent given. Topical benzocaine applied. 2 carpules of 4% septocaine with 1:100k epinephrine was administered via infiltration. #15 blade used to make incision. Abscess drained. Irrigated with saline. 1 penrose drain placed with 2 interrupted sutures with silk suture. Post-op instructions given.     RECOMMENDATIONS:   1) F/U for drain removal with outside dentist in 2 days or [facility] dental on Monday. Soft food diet, no forceful spitting/straw use, no smoking, finish antibiotics course.   2) Dental F/U with outpatient dentist for comprehensive dental care.   3) If any difficulty swallowing/breathing, fever occur, page ED.     Megan Chappell [facility], #[id number] Patient is a 51y old  Female who presents with a chief complaint of facial swelling and pain on upper left.       PAST MEDICAL & SURGICAL HISTORY:  Fibromyalgia    Anxiety    Arthritis    Breast mass    Cyclical vomiting    Renal stone  left    History of rib fracture  right    H/O umbilical hernia repair  2017    H/O: hysterectomy  2017    S/P   , 2004    History of lumbar fusion  2011    Carpal tunnel syndrome of left wrist  2006    Carpal tunnel syndrome of right wrist  2007    History of cholecystectomy  1992    Breast cyst  right  (x2)   2014  (last one)        Allergies    No Known Allergies    Intolerances    morphine (Other)      Vital Signs Last 24 Hrs  T(C): 36.6 (2021 23:37), Max: 36.9 (2021 16:53)  T(F): 97.9 (2021 23:37), Max: 98.5 (2021 16:53)  HR: 87 (2021 23:37) (85 - 100)  BP: 144/84 (2021 23:37) (132/79 - 155/91)  BP(mean): 93 (2021 16:53) (93 - 93)  RR: 17 (2021 23:37) (14 - 18)  SpO2: 100% (2021 23:37) (97% - 100%)    EOE:               Mandible FROM             Facial bones and MOM grossly intact             ( -  ) trismus             (  - ) LAD             (  + ) swelling - slight swelling and erythema of left side of face from below orbit to mandible             ( +  ) asymmetry - left side of face slightly larger than right side of face             (  + ) palpation - tender to palpation around left maxillary sinus area to left mandible              (  - ) SOB             (  - ) dysphagia             (  - ) LOC    IOE:  permanent dentition: grossly intact with multiple missing teeth           hard/soft palate: WNL           tongue/FOM WNL           labial/buccal mucosa WNL           ( +  ) palpation            (  + ) swelling - draining, fluctuant abscess apical to #11      Radiographs: 1 pan and PA of #11 taken and interpreted: #11 root tip with gross caries; periapical radiolucency noted on #11; #6 and #21 root tips; no other pathology noted    LABS:                        13.5   15.24 )-----------( 343      ( 2021 18:04 )             42.0     11-24    139  |  108  |  6<L>  ----------------------------<  115<H>  3.9   |  29  |  0.60    Ca    9.2      2021 18:04    TPro  7.4  /  Alb  3.7  /  TBili  0.4  /  DBili  x   /  AST  12<L>  /  ALT  20  /  AlkPhos  81      WBC Count: 15.24 K/uL *H* [3.80 - 10.50] ( @ 18:04)    Platelet Count - Automated: 343 K/uL [150 - 400] ( @ 18:04)            ASSESSMENT: Fluctuant, draining swelling apical to #11, indicating acute dental infection, requiring incision and drainage.     PROCEDURE:  Verbal and written consent given. Topical benzocaine applied. 2 carpules of 4% septocaine with 1:100k epinephrine was administered via infiltration. #15 blade used to make incision. Abscess drained. Irrigated with saline. 1 penrose drain placed with 2 interrupted sutures with silk suture. Post-op instructions given.     RECOMMENDATIONS:   1) F/U for drain removal with outside dentist in 2 days or [facility] dental on Monday. Soft food diet, no forceful spitting/straw use, no smoking, finish antibiotics course.   2) Dental F/U with outpatient dentist for comprehensive dental care.   3) If any difficulty swallowing/breathing, fever occur, page ED.     Megan Chappell [facility], #[id number]

## 2022-01-01 ENCOUNTER — OUTPATIENT (OUTPATIENT)
Dept: OUTPATIENT SERVICES | Facility: HOSPITAL | Age: 52
LOS: 1 days | End: 2022-01-01
Payer: COMMERCIAL

## 2022-01-01 DIAGNOSIS — Z90.49 ACQUIRED ABSENCE OF OTHER SPECIFIED PARTS OF DIGESTIVE TRACT: Chronic | ICD-10-CM

## 2022-01-01 DIAGNOSIS — Z90.710 ACQUIRED ABSENCE OF BOTH CERVIX AND UTERUS: Chronic | ICD-10-CM

## 2022-01-01 DIAGNOSIS — N60.09 SOLITARY CYST OF UNSPECIFIED BREAST: Chronic | ICD-10-CM

## 2022-01-01 DIAGNOSIS — G56.01 CARPAL TUNNEL SYNDROME, RIGHT UPPER LIMB: Chronic | ICD-10-CM

## 2022-01-01 DIAGNOSIS — Z98.1 ARTHRODESIS STATUS: Chronic | ICD-10-CM

## 2022-01-01 DIAGNOSIS — Z98.890 OTHER SPECIFIED POSTPROCEDURAL STATES: Chronic | ICD-10-CM

## 2022-01-01 DIAGNOSIS — G56.02 CARPAL TUNNEL SYNDROME, LEFT UPPER LIMB: Chronic | ICD-10-CM

## 2022-01-01 DIAGNOSIS — Z20.828 CONTACT WITH AND (SUSPECTED) EXPOSURE TO OTHER VIRAL COMMUNICABLE DISEASES: ICD-10-CM

## 2022-01-01 DIAGNOSIS — Z98.891 HISTORY OF UTERINE SCAR FROM PREVIOUS SURGERY: Chronic | ICD-10-CM

## 2022-01-01 LAB — SARS-COV-2 RNA SPEC QL NAA+PROBE: DETECTED

## 2022-01-01 PROCEDURE — U0005: CPT

## 2022-01-01 PROCEDURE — U0003: CPT

## 2022-01-01 PROCEDURE — C9803: CPT

## 2022-01-02 DIAGNOSIS — Z20.828 CONTACT WITH AND (SUSPECTED) EXPOSURE TO OTHER VIRAL COMMUNICABLE DISEASES: ICD-10-CM

## 2022-02-18 NOTE — ASU PATIENT PROFILE, ADULT - PAIN, CHRONIC: FACTORS THAT AGGRAVATE, PROFILE
Department of Anesthesiology  Postprocedure Note    Patient: Maggie Whitehead  MRN: [id number]  YOB: 1931  Date of evaluation: 2/18/2022  Time:  3:05 PM     Procedure Summary     Date: 02/18/22 Room / Location:  [facility] [address] [place] [address]    Anesthesia Start: 1316 Anesthesia Stop: 7374    Procedure: TRANSPERINEAL RECTOSIGMOIDECTOMY (ALTEMEIER PROCEDURE), FLEXIBLE SIGMOIDOSCOPY (N/A Anus) Diagnosis:       Rectal prolapse      (RECTAL PROLAPSE)    Surgeons: Ezekiel Pinto MD Responsible Provider: Haven Pagan MD    Anesthesia Type: general ASA Status: 3          Anesthesia Type: general    Nato Phase I: Nato Score: 10    Nato Phase II:      Last vitals: Reviewed and per EMR flowsheets.        Anesthesia Post Evaluation    Patient location during evaluation: PACU  Patient participation: complete - patient participated  Level of consciousness: awake  Airway patency: patent  Nausea & Vomiting: no nausea and no vomiting  Cardiovascular status: blood pressure returned to baseline  Respiratory status: acceptable  Hydration status: stable  Multimodal analgesia pain management approach activity

## 2022-03-23 NOTE — ED ADULT NURSE NOTE - CAS EDN DISCHARGE ASSESSMENT
Pt arrives to ED with 4 hours nose bleed. Unable to control at home. Clots out of mouth in EMS. On eliquis. Happened on Sunday for 15 minutes per pt/    Alert and oriented to person, place and time/Awake/Symptoms improved

## 2022-10-10 ENCOUNTER — EMERGENCY (EMERGENCY)
Facility: HOSPITAL | Age: 52
LOS: 0 days | Discharge: HOME | End: 2022-10-11
Attending: EMERGENCY MEDICINE
Payer: COMMERCIAL

## 2022-10-10 VITALS — HEIGHT: 63 IN | WEIGHT: 229.94 LBS

## 2022-10-10 DIAGNOSIS — Z98.1 ARTHRODESIS STATUS: Chronic | ICD-10-CM

## 2022-10-10 DIAGNOSIS — Z98.891 HISTORY OF UTERINE SCAR FROM PREVIOUS SURGERY: Chronic | ICD-10-CM

## 2022-10-10 DIAGNOSIS — F41.9 ANXIETY DISORDER, UNSPECIFIED: ICD-10-CM
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
## 2022-10-10 DIAGNOSIS — Z90.710 ACQUIRED ABSENCE OF BOTH CERVIX AND UTERUS: Chronic | ICD-10-CM

## 2022-10-10 DIAGNOSIS — G56.01 CARPAL TUNNEL SYNDROME, RIGHT UPPER LIMB: Chronic | ICD-10-CM

## 2022-10-10 DIAGNOSIS — Z98.890 OTHER SPECIFIED POSTPROCEDURAL STATES: Chronic | ICD-10-CM

## 2022-10-10 DIAGNOSIS — Z90.710 ACQUIRED ABSENCE OF BOTH CERVIX AND UTERUS: ICD-10-CM

## 2022-10-10 DIAGNOSIS — N60.09 SOLITARY CYST OF UNSPECIFIED BREAST: Chronic | ICD-10-CM

## 2022-10-10 DIAGNOSIS — Z90.49 ACQUIRED ABSENCE OF OTHER SPECIFIED PARTS OF DIGESTIVE TRACT: Chronic | ICD-10-CM

## 2022-10-10 DIAGNOSIS — Z87.59 PERSONAL HISTORY OF OTHER COMPLICATIONS OF PREGNANCY, CHILDBIRTH AND THE PUERPERIUM: ICD-10-CM

## 2022-10-10 DIAGNOSIS — Z87.442 PERSONAL HISTORY OF URINARY CALCULI: ICD-10-CM

## 2022-10-10 DIAGNOSIS — Z90.49 ACQUIRED ABSENCE OF OTHER SPECIFIED PARTS OF DIGESTIVE TRACT: ICD-10-CM

## 2022-10-10 DIAGNOSIS — S52.501A UNSPECIFIED FRACTURE OF THE LOWER END OF RIGHT RADIUS, INITIAL ENCOUNTER FOR CLOSED FRACTURE: ICD-10-CM

## 2022-10-10 DIAGNOSIS — M79.7 FIBROMYALGIA: ICD-10-CM

## 2022-10-10 DIAGNOSIS — Z88.5 ALLERGY STATUS TO NARCOTIC AGENT: ICD-10-CM

## 2022-10-10 DIAGNOSIS — M25.531 PAIN IN RIGHT WRIST: ICD-10-CM

## 2022-10-10 DIAGNOSIS — M19.90 UNSPECIFIED OSTEOARTHRITIS, UNSPECIFIED SITE: ICD-10-CM

## 2022-10-10 DIAGNOSIS — G56.02 CARPAL TUNNEL SYNDROME, LEFT UPPER LIMB: Chronic | ICD-10-CM
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
## 2022-10-10 PROCEDURE — 99284 EMERGENCY DEPT VISIT MOD MDM: CPT

## 2022-10-10 PROCEDURE — 73110 X-RAY EXAM OF WRIST: CPT | Mod: 26,RT

## 2022-10-10 PROCEDURE — 73090 X-RAY EXAM OF FOREARM: CPT | Mod: RT

## 2022-10-10 PROCEDURE — 73110 X-RAY EXAM OF WRIST: CPT | Mod: RT

## 2022-10-10 PROCEDURE — 25605 CLTX DST RDL FX/EPHYS SEP W/: CPT | Mod: RT

## 2022-10-10 PROCEDURE — 73090 X-RAY EXAM OF FOREARM: CPT | Mod: 26,RT

## 2022-10-10 PROCEDURE — 99285 EMERGENCY DEPT VISIT HI MDM: CPT | Mod: 25

## 2022-10-10 RX ORDER — OXYCODONE AND ACETAMINOPHEN 5; 325 MG/1; MG/1
1 TABLET ORAL ONCE
Refills: 0 | Status: DISCONTINUED | OUTPATIENT
Start: 2022-10-10 | End: 2022-10-10

## 2022-10-10 RX ORDER — IBUPROFEN 200 MG
600 TABLET ORAL ONCE
Refills: 0 | Status: COMPLETED | OUTPATIENT
Start: 2022-10-10 | End: 2022-10-10

## 2022-10-10 RX ADMIN — OXYCODONE AND ACETAMINOPHEN 1 TABLET(S): 5; 325 TABLET ORAL at 23:17

## 2022-10-10 RX ADMIN — Medication 600 MILLIGRAM(S): at 23:17

## 2022-10-10 NOTE — ED STATDOCS - CARE PROVIDER_API CALL
Ousmane Sarabia)  Orthopaedic Surgery; Surgery of the Hand  [address] [place] [address]  Phone: [phone]  Fax: [phone]  Follow Up Time:

## 2022-10-10 NOTE — ED ADULT NURSE NOTE - CAS EDN DISCHARGE ASSESSMENT
Alert and oriented to person, place and time/Awake/Symptoms improved/Neuro vascular intact post splinting

## 2022-10-10 NOTE — ED STATDOCS - PATIENT PORTAL LINK FT
You can access the FollowMyHealth Patient Portal offered by [facility] by registering at the following website: [email or web address]. By joining [employer]’s FollowMyHealth portal, you will also be able to view your health information using other applications (apps) compatible with our system.

## 2022-10-10 NOTE — ED STATDOCS - NSFOLLOWUPINSTRUCTIONS_ED_ALL_ED_FT
Arm Fracture in Adults    WHAT YOU NEED TO KNOW:    An arm fracture is a crack or break in one or more of the bones in your arm.   Adult Arm Bones         DISCHARGE INSTRUCTIONS:    Return to the emergency department if:   •The pain in your injured arm does not get better or gets worse, even after you rest and take medicine.      •Your injured arm, hand, or fingers feel numb.      •Your arm is swollen, red, and feels warm.      •Your skin over the fracture is swollen, cold, or pale.      •You cannot move your arm, hand, or fingers.       Call your doctor if:   •You have a fever.      •Your brace or splint becomes wet, damaged, or comes off.      •You have questions or concerns about your injury, treatment, or care.      Medicines: You may need any of the following:   •NSAIDs, such as ibuprofen, help decrease swelling, pain, and fever. This medicine is available with or without a doctor's order. NSAIDs can cause stomach bleeding or kidney problems in certain people. If you take blood thinner medicine, always ask your healthcare provider if NSAIDs are safe for you. Always read the medicine label and follow directions.      •Acetaminophen decreases pain and fever. It is available without a doctor's order. Ask how much to take and how often to take it. Follow directions. Read the labels of all other medicines you are using to see if they also contain acetaminophen, or ask your doctor or pharmacist. Acetaminophen can cause liver damage if not taken correctly.      •Prescription pain medicine may be given. Ask your healthcare provider how to take this medicine safely. Some prescription pain medicines contain acetaminophen. Do not take other medicines that contain acetaminophen without talking to your healthcare provider. Too much acetaminophen may cause liver damage. Prescription pain medicine may cause constipation. Ask your healthcare provider how to prevent or treat constipation.       •Take your medicine as directed. Contact your healthcare provider if you think your medicine is not helping or if you have side effects. Tell your provider if you are allergic to any medicine. Keep a list of the medicines, vitamins, and herbs you take. Include the amounts, and when and why you take them. Bring the list or the pill bottles to follow-up visits. Carry your medicine list with you in case of an emergency.      Self-care:   •Elevate your arm above the level of your heart as often as you can. This will help decrease swelling and pain. Prop your arm on pillows or blankets to keep it elevated comfortably.             •Apply ice on your arm for 15 to 20 minutes every hour or as directed. Use an ice pack, or put crushed ice in a plastic bag. Cover it with a towel. Ice helps prevent tissue damage and decreases swelling and pain.      •Rest your arm as much as possible. Ask your healthcare provider when you can put pressure or weight on your arm. Also ask when you can return to sports or exercise.       Care for your cast or splint: Ask your healthcare provider when it is okay to bathe. Do not get your cast or splint wet. Before you take a bath or shower, cover your cast or splint with a plastic bag. Tape the bag to your skin to help keep water out. Hold your arm away from the water in case the bag has a hole or tear.  •Check the skin around your cast or splint each day for any redness or open skin.      •Do not use a sharp or pointed object to scratch your skin under the cast or splint.      Physical therapy: A physical therapist teaches you exercises to help improve movement and strength, and to decrease pain.     Follow up with your doctor within 1 week: You may need to see a bone specialist within 3 to 4 days if you need surgery or more treatment. Write down your questions so you remember to ask them during your visits. Arm Fracture in Adults    please do not take anything by mouth, no food or water until seen by Dr. Sarabia as you may be having surgery tomorrow    keep elevated and wiggle fingers frequently    WHAT YOU NEED TO KNOW:    An arm fracture is a crack or break in one or more of the bones in your arm.   Adult Arm Bones         DISCHARGE INSTRUCTIONS:    Return to the emergency department if:   •The pain in your injured arm does not get better or gets worse, even after you rest and take medicine.      •Your injured arm, hand, or fingers feel numb.      •Your arm is swollen, red, and feels warm.      •Your skin over the fracture is swollen, cold, or pale.      •You cannot move your arm, hand, or fingers.       Call your doctor if:   •You have a fever.      •Your brace or splint becomes wet, damaged, or comes off.      •You have questions or concerns about your injury, treatment, or care.      Medicines: You may need any of the following:   •NSAIDs, such as ibuprofen, help decrease swelling, pain, and fever. This medicine is available with or without a doctor's order. NSAIDs can cause stomach bleeding or kidney problems in certain people. If you take blood thinner medicine, always ask your healthcare provider if NSAIDs are safe for you. Always read the medicine label and follow directions.      •Acetaminophen decreases pain and fever. It is available without a doctor's order. Ask how much to take and how often to take it. Follow directions. Read the labels of all other medicines you are using to see if they also contain acetaminophen, or ask your doctor or pharmacist. Acetaminophen can cause liver damage if not taken correctly.      •Prescription pain medicine may be given. Ask your healthcare provider how to take this medicine safely. Some prescription pain medicines contain acetaminophen. Do not take other medicines that contain acetaminophen without talking to your healthcare provider. Too much acetaminophen may cause liver damage. Prescription pain medicine may cause constipation. Ask your healthcare provider how to prevent or treat constipation.       •Take your medicine as directed. Contact your healthcare provider if you think your medicine is not helping or if you have side effects. Tell your provider if you are allergic to any medicine. Keep a list of the medicines, vitamins, and herbs you take. Include the amounts, and when and why you take them. Bring the list or the pill bottles to follow-up visits. Carry your medicine list with you in case of an emergency.      Self-care:   •Elevate your arm above the level of your heart as often as you can. This will help decrease swelling and pain. Prop your arm on pillows or blankets to keep it elevated comfortably.             •Apply ice on your arm for 15 to 20 minutes every hour or as directed. Use an ice pack, or put crushed ice in a plastic bag. Cover it with a towel. Ice helps prevent tissue damage and decreases swelling and pain.      •Rest your arm as much as possible. Ask your healthcare provider when you can put pressure or weight on your arm. Also ask when you can return to sports or exercise.       Care for your cast or splint: Ask your healthcare provider when it is okay to bathe. Do not get your cast or splint wet. Before you take a bath or shower, cover your cast or splint with a plastic bag. Tape the bag to your skin to help keep water out. Hold your arm away from the water in case the bag has a hole or tear.  •Check the skin around your cast or splint each day for any redness or open skin.      •Do not use a sharp or pointed object to scratch your skin under the cast or splint.      Physical therapy: A physical therapist teaches you exercises to help improve movement and strength, and to decrease pain.     Follow up with your doctor within 1 week: You may need to see a bone specialist within 3 to 4 days if you need surgery or more treatment. Write down your questions so you remember to ask them during your visits.

## 2022-10-10 NOTE — ED ADULT TRIAGE NOTE - CHIEF COMPLAINT QUOTE
Pt presents to the ED s/p fall. Sts "my knee gave out and I landed on my left arm." No obvious deformity in triage. Sts fingers feel numb and hurts when moving them. Denies head injury. Denies blood thinner usage.

## 2022-10-10 NOTE — ED ADULT NURSE NOTE - NSIMPLEMENTINTERV_GEN_ALL_ED
Implemented All Universal Safety Interventions:  [place] to call system. Call bell, personal items and telephone within reach. Instruct patient to call for assistance. Room bathroom lighting operational. Non-slip footwear when patient is off stretcher. Physically safe environment: no spills, clutter or unnecessary equipment. Stretcher in lowest position, wheels locked, appropriate side rails in place.

## 2022-10-10 NOTE — ED STATDOCS - CLINICAL SUMMARY MEDICAL DECISION MAKING FREE TEXT BOX
x ray revealed a distal right radius fracture, I d/w ortho who will evaluate the patient. x ray revealed a distal right radius fracture, I d/w ortho who will evaluate the patient. After ortho evaluation pt placed in splint, she is given f/u with ortho, return precautions and dc in stable condition.

## 2022-10-10 NOTE — ED STATDOCS - NS ED ROS FT
Constitutional: No fever or chills  Eyes: No visual changes  HEENT: No throat pain  CV: No chest pain  Resp: No SOB no cough  GI: No abd pain, nausea or vomiting  : No dysuria  MSK: +right wrist pain   Skin: No rash  Neuro: No headache

## 2022-10-10 NOTE — ED STATDOCS - PHYSICAL EXAMINATION
Constitutional: NAD AAOx3  Eyes: PERRL, EOMI  Head: Normocephalic atraumatic  Mouth: MMM  Cardiac: regular rate   Resp: Lungs CTAB  GI: Abd s/nt/nd  Neuro: CN2-12 intact  Extremities: Intact distal pulses b/l, + wrist swelling with deformity   Skin: No rashes

## 2022-10-11 VITALS
OXYGEN SATURATION: 98 % | HEART RATE: 82 BPM | RESPIRATION RATE: 18 BRPM | SYSTOLIC BLOOD PRESSURE: 140 MMHG | DIASTOLIC BLOOD PRESSURE: 80 MMHG | TEMPERATURE: 99 F

## 2022-10-11 PROCEDURE — 73090 X-RAY EXAM OF FOREARM: CPT | Mod: 26,RT

## 2022-10-11 PROCEDURE — 73110 X-RAY EXAM OF WRIST: CPT | Mod: 26,RT

## 2022-10-11 RX ORDER — OXYCODONE AND ACETAMINOPHEN 5; 325 MG/1; MG/1
1 TABLET ORAL
Qty: 14 | Refills: 0
Start: 2022-10-11

## 2022-10-11 NOTE — CONSULT NOTE ADULT - SUBJECTIVE AND OBJECTIVE BOX
***incomplete***  52yFemale presents to  ED c/o severe R wrist pain s/p mechanical fall. Patient denies head hit or LOC. Localizing pain to distal radius. Denies radiation of pain. Pt denies numbness, tingling or burning. R/L Hand Dominant. Patient denies any other injuries.    PMH:  Fibromyalgia    Anxiety    Arthritis    Breast mass    Cyclical vomiting    Renal stone    History of rib fracture      PSH:  No significant past surgical history    H/O umbilical hernia repair    H/O: hysterectomy    S/P     History of lumbar fusion    Carpal tunnel syndrome of left wrist    Carpal tunnel syndrome of right wrist    History of cholecystectomy    Breast cyst      AH:    Meds: See med rec    T(C): 37.1 (10-10-22 @ 22:15)  HR: 92 (10-10-22 @ 22:15)  BP: 156/99 (10-10-22 @ 22:15)  RR: 20 (10-10-22 @ 22:15)  SpO2: 97% (10-10-22 @ 22:15)  Wt(kg): --    PE R UE:  Skin intact, visible deformity of wrist, + soft tissue swelling, no ecchymosis; Decreased ROM of Wrist 2/2 pain. Normal Elbow/Shoulder ROM w/o pain. + TTP over DR/Ulna. + Rad Pulse 2+/4. SILT C5-T1, +AIN/PIN/Ulnar/Radial/Musc/Median, soft compartments;    L UE / B/L LE:  No bony TTP; Good ROM w/o pain. Able to SLR B/L. Exam Unremarkable.     Imaging:  XRay R Wrist  3 views of R Wrist demonstrates R distal radius fracture, intra-articular w/ dorsal angulation      Procedure Note:  After verbal consent obtained, ~ 10 cc of 1% Lidocaine injected into area around DR/Ulna as hematoma block. RUE hung by fingers and reduction maneuver performed. Sugartong splint applied to Forearm/Wrist and mold held. WI XR obtained which show improved alignment of R DR Fracture. Pt NVI post procedure. Pt tolerated procedure well.    A/P: 52yFemale s/p Mech Fall w/ R Distal Radius Fracture  - Pain control  - Strict Ice/Elevation  - NWB R UE with splint and sling  - Keep splint clean/dry/intact;  - Encourage active finger motion to help with swelling  - Pt aware of possible need for surgical intervention of distal radius fracture. Will FU as outpatient  - Pt made aware of signs and symptoms of compartment syndrome. Aware of need to contact Doctor / Return to ED if symtoms arise.  - All Pt's / Family Members questions answered, Pt/family understand plan.  -Patient to be NPO after midnight tonight to follow up with Dr. Sarabia tomorrow in the office for possible surgical fixation  -Discussed with Dr. Sarabia who agrees with plan 52yFemale presents to  ED c/o severe R wrist pain s/p mechanical fall in the driveway at home this evening. Patient denies head hit or LOC. Localizing pain to distal radius. Denies radiation of pain. Pt denies numbness, tingling or burning. R Hand Dominant. Patient denies any other injuries.    PMH:  Fibromyalgia    Anxiety    Arthritis    Breast mass    Cyclical vomiting    Renal stone    History of rib fracture      PSH:  No significant past surgical history    H/O umbilical hernia repair    H/O: hysterectomy    S/P     History of lumbar fusion    Carpal tunnel syndrome of left wrist    Carpal tunnel syndrome of right wrist    History of cholecystectomy    Breast cyst      AH:    Meds: See med rec    T(C): 37.1 (10-10-22 @ 22:15)  HR: 92 (10-10-22 @ 22:15)  BP: 156/99 (10-10-22 @ 22:15)  RR: 20 (10-10-22 @ 22:15)  SpO2: 97% (10-10-22 @ 22:15)  Wt(kg): --    PE R UE:  Skin intact, visible deformity of wrist, + soft tissue swelling, no ecchymosis; Decreased ROM of Wrist 2/2 pain. Normal Elbow/Shoulder ROM w/o pain. + TTP over DR/Ulna. + Rad Pulse 2+/4. SILT C5-T1, +AIN/PIN/Ulnar/Radial/Musc/Median, soft compartments;    L UE / B/L LE:  No bony TTP; Good ROM w/o pain. Able to SLR B/L. Exam Unremarkable.     Imaging:  XRay R Wrist  3 views of R Wrist demonstrates R distal radius fracture, intra-articular w/ dorsal angulation      Procedure Note:  After verbal consent obtained, ~ 10 cc of 1% Lidocaine injected into area around DR/Ulna as hematoma block. RUE hung by fingers and reduction maneuver performed. Sugartong splint applied to Forearm/Wrist and mold held. IN XR obtained which show improved alignment of R DR Fracture. Pt NVI post procedure. Pt tolerated procedure well.    A/P: 52yFemale s/p Mech Fall w/ R Distal Radius Fracture  - Pain control  - Strict Ice/Elevation  - NWB R UE with splint and sling  - Keep splint clean/dry/intact;  - Encourage active finger motion to help with swelling  - Pt aware of possible need for surgical intervention of distal radius fracture. Will FU as outpatient  - Pt made aware of signs and symptoms of compartment syndrome. Aware of need to contact Doctor / Return to ED if symtoms arise.  - All Pt's / Family Members questions answered, Pt/family understand plan.  -Patient to be NPO after midnight tonight to follow up with Dr. Sarabia tomorrow in the office for possible surgical fixation  -Discussed with Dr. Sarabia who agrees with plan

## 2022-10-18 NOTE — ED ADULT NURSE NOTE - NS ED NOTE  TALK SOMEONE YN
[facility]    Brief Operative Note    Pre-operative diagnosis: Encounter for donation of kidney [Z52.4]  Post-operative diagnosis Same as pre-operative diagnosis    Procedure: Procedure(s):  Laparoscopic Hand Assisted Left Kidney Living Unrelated Transplant Donor (Kidney Paired Exchange, Standard Voucher Donor, Recipient at another center)  Surgeon: Surgeon(s) and Role:     * Celeste Florian MD - Primary     * Baltazar Copeland MD - Fellow - Assisting     * Moe Price MD - Fellow - Assisting  Anesthesia: General with Block   Estimated Blood Loss: 200 ml    Drains: None  Specimens: * No specimens in log *  Findings:   None.  Complications: None.  Implants: * No implants in log *         no

## 2023-01-12 NOTE — ASU PREOP CHECKLIST - TEMPERATURE IN FAHRENHEIT (DEGREES F)
[[facility] or [facility] so that I can be directly involved in her care\par \par Both expressed good understanding
97.5

## 2023-01-23 NOTE — ED STATDOCS - DR. NAME
How Severe Is Your Skin Lesion?: mild Is This A New Presentation, Or A Follow-Up?: Skin Lesions Additional History: Patient states lesions come and go. No treatment or symptoms. Which Family Member (Optional)?: Grandparents on both sides of family [place]

## 2023-02-07 NOTE — PATIENT PROFILE ADULT - NSPROMEDSADMININFO_GEN_A_NUR
02/07/23                            Andrew Kemp  [address]  [facility] [address]    To Whom It May Concern:    This is to certify Andrew Kemp was evaluated with Reinier Stevenson MD on 02/07/23 and can return to modified work.  .     RESTRICTIONS: May walk/be on feet for 30 minutes at a time with 30 minute break afterwards. No push/pull greater than 1 pound. No squatting, kneeling, or use of a ladder.     These restrictions are until we see Mr. Kemp next in office, which will be scheduled after imaging is completed. Date is unknown but expected in the next 3 weeks.        Electronically signed by:  Reinier Stevenson MD  [facility]  [address]  [place] [address]  Dept Phone: [phone]     
no concerns

## 2023-02-22 NOTE — ED ADULT NURSE NOTE - NS ED NURSE RECORD ANOTHER VITAL SIGN
LVM informing patient that provider will be out due to COVID.  Informed patient to give us a call back to reschedule appt at earliest convenience.    OLIVIER Rodas  
Yes

## 2023-04-25 NOTE — ED STATDOCS - NS_ATTENDINGSCRIBE_ED_ALL_ED
H/O shoulder surgery  1/31/19 Patient instructed to take Tramadol PRN with a sip of water on the morning of procedure. I personally performed the service described in the documentation recorded by the scribe in my presence, and it accurately and completely records my words and actions.

## 2023-04-27 ENCOUNTER — INPATIENT (INPATIENT)
Facility: HOSPITAL | Age: 53
LOS: 3 days | Discharge: HOME | DRG: 392 | End: 2023-05-01
Attending: HOSPITALIST | Admitting: INTERNAL MEDICINE
Payer: COMMERCIAL

## 2023-04-27 VITALS — HEIGHT: 63 IN | WEIGHT: 246.04 LBS

## 2023-04-27 DIAGNOSIS — N60.09 SOLITARY CYST OF UNSPECIFIED BREAST: Chronic | ICD-10-CM

## 2023-04-27 DIAGNOSIS — Z98.1 ARTHRODESIS STATUS: Chronic | ICD-10-CM

## 2023-04-27 DIAGNOSIS — G56.02 CARPAL TUNNEL SYNDROME, LEFT UPPER LIMB: Chronic | ICD-10-CM

## 2023-04-27 DIAGNOSIS — Z90.710 ACQUIRED ABSENCE OF BOTH CERVIX AND UTERUS: Chronic | ICD-10-CM

## 2023-04-27 DIAGNOSIS — Z98.891 HISTORY OF UTERINE SCAR FROM PREVIOUS SURGERY: Chronic | ICD-10-CM

## 2023-04-27 DIAGNOSIS — Z90.49 ACQUIRED ABSENCE OF OTHER SPECIFIED PARTS OF DIGESTIVE TRACT: Chronic | ICD-10-CM

## 2023-04-27 DIAGNOSIS — G56.01 CARPAL TUNNEL SYNDROME, RIGHT UPPER LIMB: Chronic | ICD-10-CM

## 2023-04-27 DIAGNOSIS — Z98.890 OTHER SPECIFIED POSTPROCEDURAL STATES: Chronic | ICD-10-CM

## 2023-04-27 DIAGNOSIS — K57.92 DIVERTICULITIS OF INTESTINE, PART UNSPECIFIED, WITHOUT PERFORATION OR ABSCESS WITHOUT BLEEDING: ICD-10-CM

## 2023-04-27 LAB
ALBUMIN SERPL ELPH-MCNC: 3.5 G/DL — SIGNIFICANT CHANGE UP (ref 3.3–5)
ALP SERPL-CCNC: 67 U/L — SIGNIFICANT CHANGE UP (ref 40–120)
ALT FLD-CCNC: 20 U/L — SIGNIFICANT CHANGE UP (ref 12–78)
ANION GAP SERPL CALC-SCNC: 1 MMOL/L — LOW (ref 5–17)
AST SERPL-CCNC: 8 U/L — LOW (ref 15–37)
BASOPHILS # BLD AUTO: 0.05 K/UL — SIGNIFICANT CHANGE UP (ref 0–0.2)
BASOPHILS NFR BLD AUTO: 0.3 % — SIGNIFICANT CHANGE UP (ref 0–2)
BILIRUB SERPL-MCNC: 0.4 MG/DL — SIGNIFICANT CHANGE UP (ref 0.2–1.2)
BUN SERPL-MCNC: 17 MG/DL — SIGNIFICANT CHANGE UP (ref 7–23)
CALCIUM SERPL-MCNC: 8.5 MG/DL — SIGNIFICANT CHANGE UP (ref 8.5–10.1)
CHLORIDE SERPL-SCNC: 108 MMOL/L — SIGNIFICANT CHANGE UP (ref 96–108)
CO2 SERPL-SCNC: 27 MMOL/L — SIGNIFICANT CHANGE UP (ref 22–31)
CREAT SERPL-MCNC: 0.56 MG/DL — SIGNIFICANT CHANGE UP (ref 0.5–1.3)
EGFR: 111 ML/MIN/1.73M2 — SIGNIFICANT CHANGE UP
EOSINOPHIL # BLD AUTO: 0.05 K/UL — SIGNIFICANT CHANGE UP (ref 0–0.5)
EOSINOPHIL NFR BLD AUTO: 0.3 % — SIGNIFICANT CHANGE UP (ref 0–6)
GLUCOSE SERPL-MCNC: 125 MG/DL — HIGH (ref 70–99)
HCT VFR BLD CALC: 43.9 % — SIGNIFICANT CHANGE UP (ref 34.5–45)
HGB BLD-MCNC: 14.9 G/DL — SIGNIFICANT CHANGE UP (ref 11.5–15.5)
IMM GRANULOCYTES NFR BLD AUTO: 0.9 % — SIGNIFICANT CHANGE UP (ref 0–0.9)
LACTATE SERPL-SCNC: 1.2 MMOL/L — SIGNIFICANT CHANGE UP (ref 0.7–2)
LIDOCAIN IGE QN: 87 U/L — SIGNIFICANT CHANGE UP (ref 73–393)
LYMPHOCYTES # BLD AUTO: 11.3 % — LOW (ref 13–44)
LYMPHOCYTES # BLD AUTO: 2.15 K/UL — SIGNIFICANT CHANGE UP (ref 1–3.3)
MCHC RBC-ENTMCNC: 29.1 PG — SIGNIFICANT CHANGE UP (ref 27–34)
MCHC RBC-ENTMCNC: 33.9 GM/DL — SIGNIFICANT CHANGE UP (ref 32–36)
MCV RBC AUTO: 85.7 FL — SIGNIFICANT CHANGE UP (ref 80–100)
MONOCYTES # BLD AUTO: 0.88 K/UL — SIGNIFICANT CHANGE UP (ref 0–0.9)
MONOCYTES NFR BLD AUTO: 4.6 % — SIGNIFICANT CHANGE UP (ref 2–14)
NEUTROPHILS # BLD AUTO: 15.73 K/UL — HIGH (ref 1.8–7.4)
NEUTROPHILS NFR BLD AUTO: 82.6 % — HIGH (ref 43–77)
PLATELET # BLD AUTO: 414 K/UL — HIGH (ref 150–400)
POTASSIUM SERPL-MCNC: 3.6 MMOL/L — SIGNIFICANT CHANGE UP (ref 3.5–5.3)
POTASSIUM SERPL-SCNC: 3.6 MMOL/L — SIGNIFICANT CHANGE UP (ref 3.5–5.3)
PROT SERPL-MCNC: 7.2 GM/DL — SIGNIFICANT CHANGE UP (ref 6–8.3)
RBC # BLD: 5.12 M/UL — SIGNIFICANT CHANGE UP (ref 3.8–5.2)
RBC # FLD: 14.6 % — HIGH (ref 10.3–14.5)
SODIUM SERPL-SCNC: 136 MMOL/L — SIGNIFICANT CHANGE UP (ref 135–145)
WBC # BLD: 19.03 K/UL — HIGH (ref 3.8–10.5)
WBC # FLD AUTO: 19.03 K/UL — HIGH (ref 3.8–10.5)

## 2023-04-27 PROCEDURE — 85025 COMPLETE CBC W/AUTO DIFF WBC: CPT

## 2023-04-27 PROCEDURE — 80053 COMPREHEN METABOLIC PANEL: CPT

## 2023-04-27 PROCEDURE — 83735 ASSAY OF MAGNESIUM: CPT

## 2023-04-27 PROCEDURE — A9579: CPT

## 2023-04-27 PROCEDURE — 74183 MRI ABD W/O CNTR FLWD CNTR: CPT

## 2023-04-27 PROCEDURE — 74177 CT ABD & PELVIS W/CONTRAST: CPT | Mod: 26,MA

## 2023-04-27 PROCEDURE — 84100 ASSAY OF PHOSPHORUS: CPT

## 2023-04-27 PROCEDURE — 99285 EMERGENCY DEPT VISIT HI MDM: CPT

## 2023-04-27 PROCEDURE — 36415 COLL VENOUS BLD VENIPUNCTURE: CPT

## 2023-04-27 PROCEDURE — 85027 COMPLETE CBC AUTOMATED: CPT

## 2023-04-27 PROCEDURE — 99223 1ST HOSP IP/OBS HIGH 75: CPT | Mod: GC

## 2023-04-27 PROCEDURE — 83605 ASSAY OF LACTIC ACID: CPT

## 2023-04-27 RX ORDER — HYDROMORPHONE HYDROCHLORIDE 2 MG/ML
1 INJECTION INTRAMUSCULAR; INTRAVENOUS; SUBCUTANEOUS ONCE
Refills: 0 | Status: DISCONTINUED | OUTPATIENT
Start: 2023-04-27 | End: 2023-04-27

## 2023-04-27 RX ORDER — PIPERACILLIN AND TAZOBACTAM 4; .5 G/20ML; G/20ML
3.38 INJECTION, POWDER, LYOPHILIZED, FOR SOLUTION INTRAVENOUS ONCE
Refills: 0 | Status: COMPLETED | OUTPATIENT
Start: 2023-04-27 | End: 2023-04-27

## 2023-04-27 RX ORDER — FAMOTIDINE 10 MG/ML
20 INJECTION INTRAVENOUS
Refills: 0 | Status: DISCONTINUED | OUTPATIENT
Start: 2023-04-27 | End: 2023-05-01

## 2023-04-27 RX ORDER — ONDANSETRON 8 MG/1
4 TABLET, FILM COATED ORAL ONCE
Refills: 0 | Status: COMPLETED | OUTPATIENT
Start: 2023-04-27 | End: 2023-04-27

## 2023-04-27 RX ORDER — ACETAMINOPHEN 500 MG
2 TABLET ORAL
Qty: 0 | Refills: 0 | DISCHARGE

## 2023-04-27 RX ORDER — HYDROMORPHONE HYDROCHLORIDE 2 MG/ML
0.25 INJECTION INTRAMUSCULAR; INTRAVENOUS; SUBCUTANEOUS EVERY 4 HOURS
Refills: 0 | Status: DISCONTINUED | OUTPATIENT
Start: 2023-04-27 | End: 2023-04-27

## 2023-04-27 RX ORDER — SODIUM CHLORIDE 9 MG/ML
1000 INJECTION INTRAMUSCULAR; INTRAVENOUS; SUBCUTANEOUS ONCE
Refills: 0 | Status: COMPLETED | OUTPATIENT
Start: 2023-04-27 | End: 2023-04-27

## 2023-04-27 RX ORDER — NALOXONE HYDROCHLORIDE 4 MG/.1ML
0.4 SPRAY NASAL ONCE
Refills: 0 | Status: DISCONTINUED | OUTPATIENT
Start: 2023-04-27 | End: 2023-05-01

## 2023-04-27 RX ORDER — METRONIDAZOLE 500 MG
500 TABLET ORAL ONCE
Refills: 0 | Status: COMPLETED | OUTPATIENT
Start: 2023-04-27 | End: 2023-04-27

## 2023-04-27 RX ORDER — CIPROFLOXACIN LACTATE 400MG/40ML
400 VIAL (ML) INTRAVENOUS ONCE
Refills: 0 | Status: COMPLETED | OUTPATIENT
Start: 2023-04-27 | End: 2023-04-27

## 2023-04-27 RX ORDER — OXYCODONE HYDROCHLORIDE 5 MG/1
1 TABLET ORAL
Qty: 0 | Refills: 0 | DISCHARGE

## 2023-04-27 RX ORDER — PIPERACILLIN AND TAZOBACTAM 4; .5 G/20ML; G/20ML
3.38 INJECTION, POWDER, LYOPHILIZED, FOR SOLUTION INTRAVENOUS ONCE
Refills: 0 | Status: COMPLETED | OUTPATIENT
Start: 2023-04-28 | End: 2023-04-28

## 2023-04-27 RX ORDER — KETOROLAC TROMETHAMINE 30 MG/ML
15 SYRINGE (ML) INJECTION EVERY 8 HOURS
Refills: 0 | Status: DISCONTINUED | OUTPATIENT
Start: 2023-04-27 | End: 2023-04-27

## 2023-04-27 RX ORDER — SODIUM CHLORIDE 9 MG/ML
1000 INJECTION, SOLUTION INTRAVENOUS
Refills: 0 | Status: DISCONTINUED | OUTPATIENT
Start: 2023-04-27 | End: 2023-04-28

## 2023-04-27 RX ORDER — OXYCODONE HYDROCHLORIDE 5 MG/1
1 TABLET ORAL
Refills: 0 | DISCHARGE

## 2023-04-27 RX ORDER — HYDROMORPHONE HYDROCHLORIDE 2 MG/ML
0.25 INJECTION INTRAMUSCULAR; INTRAVENOUS; SUBCUTANEOUS EVERY 4 HOURS
Refills: 0 | Status: DISCONTINUED | OUTPATIENT
Start: 2023-04-27 | End: 2023-04-28

## 2023-04-27 RX ORDER — GABAPENTIN 400 MG/1
100 CAPSULE ORAL EVERY 8 HOURS
Refills: 0 | Status: DISCONTINUED | OUTPATIENT
Start: 2023-04-27 | End: 2023-04-27

## 2023-04-27 RX ORDER — METOCLOPRAMIDE HCL 10 MG
1 TABLET ORAL
Qty: 0 | Refills: 0 | DISCHARGE

## 2023-04-27 RX ORDER — HYDROMORPHONE HYDROCHLORIDE 2 MG/ML
0.5 INJECTION INTRAMUSCULAR; INTRAVENOUS; SUBCUTANEOUS EVERY 4 HOURS
Refills: 0 | Status: DISCONTINUED | OUTPATIENT
Start: 2023-04-27 | End: 2023-04-27

## 2023-04-27 RX ORDER — KETOROLAC TROMETHAMINE 30 MG/ML
30 SYRINGE (ML) INJECTION ONCE
Refills: 0 | Status: DISCONTINUED | OUTPATIENT
Start: 2023-04-27 | End: 2023-04-27

## 2023-04-27 RX ORDER — OXYCODONE HYDROCHLORIDE 5 MG/1
15 TABLET ORAL
Refills: 0 | Status: DISCONTINUED | OUTPATIENT
Start: 2023-04-27 | End: 2023-05-01

## 2023-04-27 RX ORDER — PIPERACILLIN AND TAZOBACTAM 4; .5 G/20ML; G/20ML
3.38 INJECTION, POWDER, LYOPHILIZED, FOR SOLUTION INTRAVENOUS EVERY 6 HOURS
Refills: 0 | Status: DISCONTINUED | OUTPATIENT
Start: 2023-04-28 | End: 2023-05-01

## 2023-04-27 RX ORDER — PANTOPRAZOLE SODIUM 20 MG/1
40 TABLET, DELAYED RELEASE ORAL
Refills: 0 | Status: DISCONTINUED | OUTPATIENT
Start: 2023-04-27 | End: 2023-05-01

## 2023-04-27 RX ORDER — ENOXAPARIN SODIUM 100 MG/ML
40 INJECTION SUBCUTANEOUS EVERY 12 HOURS
Refills: 0 | Status: DISCONTINUED | OUTPATIENT
Start: 2023-04-27 | End: 2023-05-01

## 2023-04-27 RX ADMIN — HYDROMORPHONE HYDROCHLORIDE 1 MILLIGRAM(S): 2 INJECTION INTRAMUSCULAR; INTRAVENOUS; SUBCUTANEOUS at 19:04

## 2023-04-27 RX ADMIN — Medication 200 MILLIGRAM(S): at 20:32

## 2023-04-27 RX ADMIN — SODIUM CHLORIDE 1000 MILLILITER(S): 9 INJECTION INTRAMUSCULAR; INTRAVENOUS; SUBCUTANEOUS at 16:32

## 2023-04-27 RX ADMIN — SODIUM CHLORIDE 1000 MILLILITER(S): 9 INJECTION INTRAMUSCULAR; INTRAVENOUS; SUBCUTANEOUS at 19:04

## 2023-04-27 RX ADMIN — HYDROMORPHONE HYDROCHLORIDE 0.25 MILLIGRAM(S): 2 INJECTION INTRAMUSCULAR; INTRAVENOUS; SUBCUTANEOUS at 22:54

## 2023-04-27 RX ADMIN — ENOXAPARIN SODIUM 40 MILLIGRAM(S): 100 INJECTION SUBCUTANEOUS at 22:56

## 2023-04-27 RX ADMIN — Medication 100 MILLIGRAM(S): at 18:36

## 2023-04-27 RX ADMIN — ONDANSETRON 4 MILLIGRAM(S): 8 TABLET, FILM COATED ORAL at 22:50

## 2023-04-27 RX ADMIN — SODIUM CHLORIDE 1000 MILLILITER(S): 9 INJECTION INTRAMUSCULAR; INTRAVENOUS; SUBCUTANEOUS at 20:09

## 2023-04-27 RX ADMIN — ONDANSETRON 4 MILLIGRAM(S): 8 TABLET, FILM COATED ORAL at 16:32

## 2023-04-27 RX ADMIN — ONDANSETRON 4 MILLIGRAM(S): 8 TABLET, FILM COATED ORAL at 19:05

## 2023-04-27 RX ADMIN — Medication 30 MILLIGRAM(S): at 16:33

## 2023-04-27 RX ADMIN — Medication 500 MILLIGRAM(S): at 19:40

## 2023-04-27 RX ADMIN — FAMOTIDINE 20 MILLIGRAM(S): 10 INJECTION INTRAVENOUS at 22:56

## 2023-04-27 NOTE — ED STATDOCS - PROGRESS NOTE DETAILS
53 y/o female with PMHx of fibromyalgia, kidney stones, cholecystectomy, pancreatitis presents to the ED c/o abd pain, nausea and vomiting, states she has been unable to keep anything down. Pt does not drink alcohol. Patient with persistent LLQ pain. May need admission. Will medicate for pain. +Diverticulitis on CT. Patient shows no signs of improvement, WBC 19. Patient admitted to hospitalist dr. Odonnell by dr. Wheeler. ~Adam Rausch PA-C

## 2023-04-27 NOTE — ED STATDOCS - NS ED ATTENDING STATEMENT MOD
This was a shared visit with the SHELDON. I reviewed and verified the documentation and independently performed the documented:

## 2023-04-27 NOTE — ED STATDOCS - PHYSICAL EXAMINATION
Constitutional: NAD   Eyes: PERRLA  Head: Normocephalic   Mouth: MMM  Cardiac: regular rate   Resp: Lungs CTAB  GI: Abd s/nt/nd, no rebound or guarding.  Neuro: awake, alert, moving all extremities  Skin: No rashes Constitutional: Middle aged female laying in bed in moderate distress.   Eyes: PERRLA  Head: Normocephalic   Mouth: MMM  Cardiac: regular rate   Resp: Lungs CTAB  GI: Abd s/nt/nd, no rebound or guarding.  Neuro: awake, alert, moving all extremities  Skin: No rashes  diffusely tender Attending: Constitutional: Middle aged female laying in bed in moderate distress.   Eyes: PERRLA  Head: Normocephalic   Mouth: MMM  Cardiac: regular rate   Resp: Lungs CTAB  GI: Abd s/nt/nd, no rebound or guarding.  Neuro: awake, alert, moving all extremities  Skin: No rashes  diffusely tender

## 2023-04-27 NOTE — ED ADULT NURSE NOTE - OBJECTIVE STATEMENT
52y female presents to the Ed A/Ox4, c/o of abdominal pain. Pt reports onset of symptoms was yesterday with N/V/D. Pt had a similar episode last year and she was diagnosed with pancreatitis. Pt reports decrease PO intake for the past 2 days.

## 2023-04-27 NOTE — H&P ADULT - NSHPSOCIALHISTORY_GEN_ALL_CORE
Lives w her daughters, is on workers comp, admits to everyday smoking, ½ pack a day for 30 years. Denies drinking or illicit substance use. Does not use any assistive walking devices.

## 2023-04-27 NOTE — H&P ADULT - HISTORY OF PRESENT ILLNESS
51 yo F w PMH of pancreatitis, morbid obesity, fibromyalgia, anxiety, OA, cyclical vomiting syndrome presenting for intractable abd pain for 2 days. Pt states as of 2 days ago, she started experiencing severe diffuse abd pain associated w diarrhea (3 episodes yesterday, 4 today, non-bloody), vomiting every 20 minutes (non-bloody, + bilous), subjective fevers and chills at home. Denies recent travel, sick contacts, or changes in diet. Pt states she’s been unable to keep down any food or medications.       In ED: Given Cipro and Metronidazole, S/P 2L NS, Zofran, Hydromorphone 1mg x 1 along w Keterolac 30 mg x 1. Labs notable for WBC 19 w neutrophilic predominance, Plts 414. CT Abd: Apparent mild focal mural thickening at the rectosigmoid junction with adjacent diverticulosis and stranding, compatible with diverticulitis. No associated abscess is identified.      Past Admissions: Visited ED on Oct/2022 for mechanical fall, found to have distal right radius fracture, bandage placed by Ortho and recommended to F/U as outpt.       53 yo F w PMH of pancreatitis, morbid obesity, fibromyalgia, anxiety, OA, cyclical vomiting syndrome presenting for intractable abd pain for 2 days. Pt states as of 2 days ago, she started experiencing severe diffuse abd pain associated w diarrhea (3 episodes yesterday, 4 today, non-bloody), vomiting every 20 minutes (non-bloody, + bilous), subjective fevers and chills at home. Denies recent travel, sick contacts, or changes in diet. Pt states she’s been unable to keep down any food or medications.       In ED: Given Cipro and Metronidazole, S/P 2L NS, Zofran, Hydromorphone 1mg x 1 along w Ketorolac 30 mg x 1. Labs notable for WBC 19 w neutrophilic predominance, Plts 414. CT Abd: Apparent mild focal mural thickening at the rectosigmoid junction with adjacent diverticulosis and stranding, compatible with diverticulitis. No associated abscess is identified.      Past Admissions: Visited ED on Oct/2022 for mechanical fall, found to have distal right radius fracture, bandage placed by Ortho and recommended to F/U as outpt.

## 2023-04-27 NOTE — H&P ADULT - ATTENDING COMMENTS
52 year old female PMHx as noted above admitted for further evaluation and management of  intractable abdominal pain found to have acute diverticulitis.     #Acute Diverticulitis   -admit to Medicine  -CT imaging reviewed revealed signs of acute diverticulitis   -patient is s/p Ciprofloxacin and Metronidazole IV in the ED   -cont. IV antibiotics as outlined above   -Labs reviewed   -f/u w/ GI consultation in the am  -cont. IV hydration as above   -strict I/Os  -Clear liquid diet and advance as tolerated   -cont. pain management     #Vte ppx  -cont. as above

## 2023-04-27 NOTE — ED STATDOCS - OBJECTIVE STATEMENT
51 y/o female with PMHx of fibromyalgia, kidney stones, cholecystectomy, pancreatitis presents to the ED c/o abd pain, nausea and vomiting, states she has been unable to keep anything down. Pt does not drink alcohol.

## 2023-04-27 NOTE — ED STATDOCS - NS ED ROS FT
Constitutional: No fever or chills  Eyes: No visual changes  HEENT: No throat pain  CV: No chest pain  Resp: No SOB no cough  GI:+abd pain, +N/V  : No dysuria  MSK: No musculoskeletal pain  Skin: No rash  Neuro: No headache

## 2023-04-27 NOTE — H&P ADULT - ASSESSMENT
51 yo F w PMH of pancreatitis, morbid obesity, fibromyalgia, anxiety, OA, cyclical vomiting syndrome presenting for intractable abd pain for 2 days. Found to have diverticulitis.     #Diverticulitis   -Pt presented w intractable abd pain, N/V and diarrhea   -CT scan demonstrates diverticulitis   -S/P Cipro and Metronidazole in ED   -Continue w Zosyn   -F/U Lactate   -LR at 100 cc/hour   -Clear diet, advance as tolerated   - Continue home oxycodone and PRN Dilaudid 0.25 mg for severe pain   -Monitor leukocytosis     #Diet   -Clear liquid diet , advance as tolerated    #DVT PPX    -Lovenox sub Q     #Advanced Directives   -FULL CODE     -Above discussed w Dr. Odonnell  51 yo F w PMH of pancreatitis, morbid obesity, fibromyalgia, anxiety, OA, cyclical vomiting syndrome presenting for intractable abd pain for 2 days. Found to have diverticulitis.     #Diverticulitis   -Pt presented w intractable abd pain, N/V and diarrhea   -CT scan demonstrates diverticulitis   -S/P Cipro and Metronidazole in ED   -Continue w Zosyn   -Lactate WNL  -GI consult placed, will need outpatient colonoscopy  -LR at 100 cc/hour   -Clear diet, advance as tolerated   - Continue home oxycodone and PRN Dilaudid 1 mg for severe pain   -Monitor leukocytosis     #Diet   -Clear liquid diet , advance as tolerated    #DVT PPX    -Lovenox sub Q     #Advanced Directives   -FULL CODE     -Above discussed w Dr. Odonnell  53 yo F w PMH of pancreatitis, morbid obesity, fibromyalgia, anxiety, OA, cyclical vomiting syndrome presenting for intractable abd pain for 2 days. Found to have diverticulitis.     #Severe Abdominal Pain due to Acute Diverticulitis   -Pt presented w intractable abd pain, N/V and diarrhea   -CT scan demonstrates diverticulitis   -S/P Cipro and Metronidazole in ED   -Continue w Zosyn   -Lactate WNL  -GI consult placed, will need outpatient colonoscopy  -LR at 100 cc/hour   -Clear diet, advance as tolerated   - Continue home oxycodone and PRN Dilaudid 1 mg for severe pain   -Monitor leukocytosis     #Diet   -Clear liquid diet , advance as tolerated    #DVT PPX    -Lovenox sub Q     #Advanced Directives   -FULL CODE     -Above discussed w Dr. Odonnell

## 2023-04-27 NOTE — H&P ADULT - NSHPPHYSICALEXAM_GEN_ALL_CORE
Vitals  T(F): 98.2 (04-27-23 @ 21:36), Max: 98.3 (04-27-23 @ 15:21)  HR: 80 (04-27-23 @ 21:36) (70 - 80)  BP: 158/86 (04-27-23 @ 21:36) (142/80 - 158/86)  RR: 18 (04-27-23 @ 21:36) (18 - 18)  SpO2: 98% (04-27-23 @ 21:36) (96% - 98%)    Physical Exam   Gen: nauseous, uncomfortable  HENT: atraumatic head and ears, no gross abnormalities of ears, mucous membranes moist, no oral lesions, neck supple without masses/goiter/lymphadenopathy  CV: RRR, nl s1/s2, no M/R/G  Pulm: nl respiratory effort, CTAB, no wheezes/crackles/rhonchi  Back: no scoliosis, lordosis, or kyphosis, no tenderness  Abd: tenderness worse in LLQ, guarding, hyperactive BS in all 4 quadrants, no rebound, no masses  Extremities: no pedal edema, pedal pulses palpable   Skin: nl warm and dry, no wounds   Neuro: A&Ox3, answering questions appropriately, PERRL, EOMI, face symmetric, sensation equal bilaterally in face, tongue midline, no dysarthria, 5/5 strength in upper and lower extremities bilaterally, sensation intact in upper and lower extremities bilaterally, nl finger to nose, and nl heel to shin   Pysch: no depression, no SI, no HI Vitals  T(F): 98.2 (04-27-23 @ 21:36), Max: 98.3 (04-27-23 @ 15:21)  HR: 80 (04-27-23 @ 21:36) (70 - 80)  BP: 158/86 (04-27-23 @ 21:36) (142/80 - 158/86)  RR: 18 (04-27-23 @ 21:36) (18 - 18)  SpO2: 98% (04-27-23 @ 21:36) (96% - 98%)    Physical Exam   Gen: nauseous, uncomfortable  HEENT: atraumatic head and ears, no gross abnormalities of ears, mucous membranes moist, no oral lesions, neck supple without masses/goiter/lymphadenopathy  CV: RRR, nl s1/s2, no M/R/G  Pulm: nl respiratory effort, CTAB, no wheezes/crackles/rhonchi  Back: no scoliosis, lordosis, or kyphosis, no tenderness  Abd: tenderness worse in LLQ, guarding, hyperactive BS in all 4 quadrants, no rebound, no masses  Extremities: no pedal edema, pedal pulses palpable   Skin: nl warm and dry, no wounds   Neuro: A&Ox3, answering questions appropriately, PERRL, EOMI, face symmetric, sensation equal bilaterally in face, tongue midline, no dysarthria, 5/5 strength in upper and lower extremities bilaterally, sensation intact in upper and lower extremities bilaterally, nl finger to nose, and nl heel to shin   PSYCH: no depression, no SI, no HI

## 2023-04-27 NOTE — ED STATDOCS - CLINICAL SUMMARY MEDICAL DECISION MAKING FREE TEXT BOX
53 y/o female hx of pancreatitis presents with abd pain inability to tolerate PO. Will obtain CT, blood work, medicate and reassess. 51 y/o female hx of pancreatitis presents with abd pain inability to tolerate PO. Will obtain CT, blood work, medicate and reassess.      +Diverticulitis on CT. Patient shows no signs of improvement, WBC 19. Patient admitted to hospitalist dr. Odonnell by dr. Wheeler. ~Adam Rausch PA-C

## 2023-04-27 NOTE — H&P ADULT - TIME BILLING
I spent a total of 90 minutes on the date of this encounter coordinating the patient's care. This includes reviewing documentation pertinent to this admission, results and imaging in addition to completing a history and physical examination on the patient. Further tests, medications, and procedures have been ordered as indicated. Laboratory results and the plan of care were communicated to the patient and their family member. Supporting documentation was completed and added to the patient's chart.

## 2023-04-28 LAB
ALBUMIN SERPL ELPH-MCNC: 3.3 G/DL — SIGNIFICANT CHANGE UP (ref 3.3–5)
ALP SERPL-CCNC: 65 U/L — SIGNIFICANT CHANGE UP (ref 40–120)
ALT FLD-CCNC: 22 U/L — SIGNIFICANT CHANGE UP (ref 12–78)
ANION GAP SERPL CALC-SCNC: 3 MMOL/L — LOW (ref 5–17)
AST SERPL-CCNC: 11 U/L — LOW (ref 15–37)
BASOPHILS # BLD AUTO: 0.04 K/UL — SIGNIFICANT CHANGE UP (ref 0–0.2)
BASOPHILS NFR BLD AUTO: 0.2 % — SIGNIFICANT CHANGE UP (ref 0–2)
BILIRUB SERPL-MCNC: 0.5 MG/DL — SIGNIFICANT CHANGE UP (ref 0.2–1.2)
BUN SERPL-MCNC: 15 MG/DL — SIGNIFICANT CHANGE UP (ref 7–23)
CALCIUM SERPL-MCNC: 8.6 MG/DL — SIGNIFICANT CHANGE UP (ref 8.5–10.1)
CHLORIDE SERPL-SCNC: 110 MMOL/L — HIGH (ref 96–108)
CO2 SERPL-SCNC: 25 MMOL/L — SIGNIFICANT CHANGE UP (ref 22–31)
CREAT SERPL-MCNC: 0.54 MG/DL — SIGNIFICANT CHANGE UP (ref 0.5–1.3)
EGFR: 112 ML/MIN/1.73M2 — SIGNIFICANT CHANGE UP
EOSINOPHIL # BLD AUTO: 0.01 K/UL — SIGNIFICANT CHANGE UP (ref 0–0.5)
EOSINOPHIL NFR BLD AUTO: 0.1 % — SIGNIFICANT CHANGE UP (ref 0–6)
GLUCOSE SERPL-MCNC: 126 MG/DL — HIGH (ref 70–99)
HCT VFR BLD CALC: 39.6 % — SIGNIFICANT CHANGE UP (ref 34.5–45)
HGB BLD-MCNC: 13 G/DL — SIGNIFICANT CHANGE UP (ref 11.5–15.5)
IMM GRANULOCYTES NFR BLD AUTO: 0.9 % — SIGNIFICANT CHANGE UP (ref 0–0.9)
LYMPHOCYTES # BLD AUTO: 13.5 % — SIGNIFICANT CHANGE UP (ref 13–44)
LYMPHOCYTES # BLD AUTO: 2.24 K/UL — SIGNIFICANT CHANGE UP (ref 1–3.3)
MAGNESIUM SERPL-MCNC: 2.2 MG/DL — SIGNIFICANT CHANGE UP (ref 1.6–2.6)
MCHC RBC-ENTMCNC: 28.5 PG — SIGNIFICANT CHANGE UP (ref 27–34)
MCHC RBC-ENTMCNC: 32.8 GM/DL — SIGNIFICANT CHANGE UP (ref 32–36)
MCV RBC AUTO: 86.8 FL — SIGNIFICANT CHANGE UP (ref 80–100)
MONOCYTES # BLD AUTO: 1.14 K/UL — HIGH (ref 0–0.9)
MONOCYTES NFR BLD AUTO: 6.8 % — SIGNIFICANT CHANGE UP (ref 2–14)
NEUTROPHILS # BLD AUTO: 13.07 K/UL — HIGH (ref 1.8–7.4)
NEUTROPHILS NFR BLD AUTO: 78.5 % — HIGH (ref 43–77)
PHOSPHATE SERPL-MCNC: 3 MG/DL — SIGNIFICANT CHANGE UP (ref 2.5–4.5)
PLATELET # BLD AUTO: 356 K/UL — SIGNIFICANT CHANGE UP (ref 150–400)
POTASSIUM SERPL-MCNC: 3.4 MMOL/L — LOW (ref 3.5–5.3)
POTASSIUM SERPL-SCNC: 3.4 MMOL/L — LOW (ref 3.5–5.3)
PROT SERPL-MCNC: 6.9 GM/DL — SIGNIFICANT CHANGE UP (ref 6–8.3)
RBC # BLD: 4.56 M/UL — SIGNIFICANT CHANGE UP (ref 3.8–5.2)
RBC # FLD: 14.4 % — SIGNIFICANT CHANGE UP (ref 10.3–14.5)
SODIUM SERPL-SCNC: 138 MMOL/L — SIGNIFICANT CHANGE UP (ref 135–145)
WBC # BLD: 16.65 K/UL — HIGH (ref 3.8–10.5)
WBC # FLD AUTO: 16.65 K/UL — HIGH (ref 3.8–10.5)

## 2023-04-28 PROCEDURE — 99233 SBSQ HOSP IP/OBS HIGH 50: CPT | Mod: GC

## 2023-04-28 RX ORDER — SODIUM CHLORIDE 9 MG/ML
1000 INJECTION INTRAMUSCULAR; INTRAVENOUS; SUBCUTANEOUS
Refills: 0 | Status: DISCONTINUED | OUTPATIENT
Start: 2023-04-28 | End: 2023-05-01

## 2023-04-28 RX ORDER — ONDANSETRON 8 MG/1
4 TABLET, FILM COATED ORAL EVERY 4 HOURS
Refills: 0 | Status: DISCONTINUED | OUTPATIENT
Start: 2023-04-28 | End: 2023-05-01

## 2023-04-28 RX ORDER — HYDROMORPHONE HYDROCHLORIDE 2 MG/ML
1 INJECTION INTRAMUSCULAR; INTRAVENOUS; SUBCUTANEOUS ONCE
Refills: 0 | Status: DISCONTINUED | OUTPATIENT
Start: 2023-04-28 | End: 2023-04-28

## 2023-04-28 RX ORDER — HYDROMORPHONE HYDROCHLORIDE 2 MG/ML
1 INJECTION INTRAMUSCULAR; INTRAVENOUS; SUBCUTANEOUS EVERY 6 HOURS
Refills: 0 | Status: DISCONTINUED | OUTPATIENT
Start: 2023-04-28 | End: 2023-04-28

## 2023-04-28 RX ORDER — HYDROMORPHONE HYDROCHLORIDE 2 MG/ML
1 INJECTION INTRAMUSCULAR; INTRAVENOUS; SUBCUTANEOUS EVERY 4 HOURS
Refills: 0 | Status: DISCONTINUED | OUTPATIENT
Start: 2023-04-28 | End: 2023-04-28

## 2023-04-28 RX ORDER — ONDANSETRON 8 MG/1
4 TABLET, FILM COATED ORAL EVERY 6 HOURS
Refills: 0 | Status: DISCONTINUED | OUTPATIENT
Start: 2023-04-28 | End: 2023-04-28

## 2023-04-28 RX ORDER — HYDROMORPHONE HYDROCHLORIDE 2 MG/ML
2 INJECTION INTRAMUSCULAR; INTRAVENOUS; SUBCUTANEOUS EVERY 4 HOURS
Refills: 0 | Status: DISCONTINUED | OUTPATIENT
Start: 2023-04-28 | End: 2023-04-29

## 2023-04-28 RX ADMIN — ENOXAPARIN SODIUM 40 MILLIGRAM(S): 100 INJECTION SUBCUTANEOUS at 22:02

## 2023-04-28 RX ADMIN — HYDROMORPHONE HYDROCHLORIDE 1 MILLIGRAM(S): 2 INJECTION INTRAMUSCULAR; INTRAVENOUS; SUBCUTANEOUS at 10:29

## 2023-04-28 RX ADMIN — ONDANSETRON 4 MILLIGRAM(S): 8 TABLET, FILM COATED ORAL at 05:39

## 2023-04-28 RX ADMIN — HYDROMORPHONE HYDROCHLORIDE 2 MILLIGRAM(S): 2 INJECTION INTRAMUSCULAR; INTRAVENOUS; SUBCUTANEOUS at 22:53

## 2023-04-28 RX ADMIN — HYDROMORPHONE HYDROCHLORIDE 1 MILLIGRAM(S): 2 INJECTION INTRAMUSCULAR; INTRAVENOUS; SUBCUTANEOUS at 11:00

## 2023-04-28 RX ADMIN — FAMOTIDINE 20 MILLIGRAM(S): 10 INJECTION INTRAVENOUS at 22:03

## 2023-04-28 RX ADMIN — ENOXAPARIN SODIUM 40 MILLIGRAM(S): 100 INJECTION SUBCUTANEOUS at 09:49

## 2023-04-28 RX ADMIN — PIPERACILLIN AND TAZOBACTAM 25 GRAM(S): 4; .5 INJECTION, POWDER, LYOPHILIZED, FOR SOLUTION INTRAVENOUS at 14:04

## 2023-04-28 RX ADMIN — SODIUM CHLORIDE 100 MILLILITER(S): 9 INJECTION, SOLUTION INTRAVENOUS at 01:45

## 2023-04-28 RX ADMIN — HYDROMORPHONE HYDROCHLORIDE 2 MILLIGRAM(S): 2 INJECTION INTRAMUSCULAR; INTRAVENOUS; SUBCUTANEOUS at 18:45

## 2023-04-28 RX ADMIN — HYDROMORPHONE HYDROCHLORIDE 2 MILLIGRAM(S): 2 INJECTION INTRAMUSCULAR; INTRAVENOUS; SUBCUTANEOUS at 14:35

## 2023-04-28 RX ADMIN — FAMOTIDINE 20 MILLIGRAM(S): 10 INJECTION INTRAVENOUS at 09:49

## 2023-04-28 RX ADMIN — PIPERACILLIN AND TAZOBACTAM 25 GRAM(S): 4; .5 INJECTION, POWDER, LYOPHILIZED, FOR SOLUTION INTRAVENOUS at 06:36

## 2023-04-28 RX ADMIN — HYDROMORPHONE HYDROCHLORIDE 2 MILLIGRAM(S): 2 INJECTION INTRAMUSCULAR; INTRAVENOUS; SUBCUTANEOUS at 18:15

## 2023-04-28 RX ADMIN — ONDANSETRON 4 MILLIGRAM(S): 8 TABLET, FILM COATED ORAL at 09:49

## 2023-04-28 RX ADMIN — ONDANSETRON 4 MILLIGRAM(S): 8 TABLET, FILM COATED ORAL at 22:53

## 2023-04-28 RX ADMIN — HYDROMORPHONE HYDROCHLORIDE 1 MILLIGRAM(S): 2 INJECTION INTRAMUSCULAR; INTRAVENOUS; SUBCUTANEOUS at 06:39

## 2023-04-28 RX ADMIN — HYDROMORPHONE HYDROCHLORIDE 2 MILLIGRAM(S): 2 INJECTION INTRAMUSCULAR; INTRAVENOUS; SUBCUTANEOUS at 23:10

## 2023-04-28 RX ADMIN — PIPERACILLIN AND TAZOBACTAM 25 GRAM(S): 4; .5 INJECTION, POWDER, LYOPHILIZED, FOR SOLUTION INTRAVENOUS at 20:27

## 2023-04-28 RX ADMIN — HYDROMORPHONE HYDROCHLORIDE 1 MILLIGRAM(S): 2 INJECTION INTRAMUSCULAR; INTRAVENOUS; SUBCUTANEOUS at 01:47

## 2023-04-28 RX ADMIN — ONDANSETRON 4 MILLIGRAM(S): 8 TABLET, FILM COATED ORAL at 18:15

## 2023-04-28 RX ADMIN — HYDROMORPHONE HYDROCHLORIDE 2 MILLIGRAM(S): 2 INJECTION INTRAMUSCULAR; INTRAVENOUS; SUBCUTANEOUS at 14:04

## 2023-04-28 RX ADMIN — PIPERACILLIN AND TAZOBACTAM 200 GRAM(S): 4; .5 INJECTION, POWDER, LYOPHILIZED, FOR SOLUTION INTRAVENOUS at 01:43

## 2023-04-28 RX ADMIN — SODIUM CHLORIDE 100 MILLILITER(S): 9 INJECTION INTRAMUSCULAR; INTRAVENOUS; SUBCUTANEOUS at 18:19

## 2023-04-28 RX ADMIN — ONDANSETRON 4 MILLIGRAM(S): 8 TABLET, FILM COATED ORAL at 14:07

## 2023-04-28 NOTE — PROGRESS NOTE ADULT - ASSESSMENT
51 yo F w PMH of pancreatitis, morbid obesity, fibromyalgia, anxiety, OA, cyclical vomiting syndrome presenting for intractable abd pain for 2 days. Found to have diverticulitis.     #Severe Abdominal Pain due to Acute Diverticulitis   -Pt presented w intractable abd pain, N/V and diarrhea   -CT scan demonstrates diverticulitis   -S/P Cipro and Metronidazole in ED   -Continue w Zosyn   -Lactate WNL  -GI consult placed, will need outpatient colonoscopy  -LR at 100 cc/hour   -Clear diet, advance as tolerated   - Continue home oxycodone and PRN Dilaudid 1 mg for severe pain   -Monitor leukocytosis   - 4/28: per pt's request, change dilaudid 1mg from Q6 to Q4 per, and zofran from Q6 to Q4.     #Diet   -Clear liquid diet , advance as tolerated    #DVT PPX    -Lovenox sub Q     #Advanced Directives   -FULL CODE

## 2023-04-28 NOTE — CONSULT NOTE ADULT - SUBJECTIVE AND OBJECTIVE BOX
HPI:  52 F w PMH of pancreatitis, morbid obesity, fibromyalgia, anxiety, OA, cyclical vomiting syndrome presenting for intractable abd pain for 2 days. Symptoms began 2 days ago with diffuse abdominal pain with increased intensity in LLQ and non-bloody diarrhea. She also had subsequent non-bloody, bilious emesis and subjective fevers. she was unable to tolerate PO prompting ED visit. She follows with her outpatient GI Dr. Childs and has undergo endoscopy/colonoscopy within the past 3 years.     In Ed, hemodynamically stable and afebrile. Labs notable for WBC 19 > 16.65 otherwise normal. CT A/P showing mild focal mural thickening at the rectosigmoid junction with adjacent diverticulosis and stranding compatible with diverticulitis without abscess or perforation.    PAST MEDICAL & SURGICAL HISTORY:  Fibromyalgia      Anxiety      Arthritis      Breast mass      Cyclical vomiting      Renal stone  left      History of rib fracture  right      H/O umbilical hernia repair  2017      H/O: hysterectomy  2017      S/P   ,       History of lumbar fusion        Carpal tunnel syndrome of left wrist  2006      Carpal tunnel syndrome of right wrist        History of cholecystectomy  1992      Breast cyst  right  (x2)     (last one)          Home Medications:  diclofenac potassium 25 mg oral tablet: 1 tab(s) orally once a day (2023 20:47)  oxyCODONE 15 mg oral tablet: 1 tab(s) orally 4 times a day as needed for (2023 20:51)      MEDICATIONS  (STANDING):  enoxaparin Injectable 40 milliGRAM(s) SubCutaneous every 12 hours  famotidine Injectable 20 milliGRAM(s) IV Push two times a day  lactated ringers. 1000 milliLiter(s) (100 mL/Hr) IV Continuous <Continuous>  naloxone Injectable 0.4 milliGRAM(s) IV Push once  pantoprazole    Tablet 40 milliGRAM(s) Oral before breakfast  piperacillin/tazobactam IVPB.. 3.375 Gram(s) IV Intermittent every 6 hours    MEDICATIONS  (PRN):  HYDROmorphone  Injectable 2 milliGRAM(s) IV Push every 4 hours PRN Severe Pain (7 - 10)  ondansetron Injectable 4 milliGRAM(s) IV Push every 4 hours PRN Nausea and/or Vomiting  oxyCODONE    IR 15 milliGRAM(s) Oral four times a day PRN Moderate Pain (4 - 6)      Allergies    No Known Allergies    Intolerances        SOCIAL HISTORY:    FAMILY HISTORY:  Family history of systemic lupus erythematosus (Grandparent)        ROS  As above  Otherwise unremarkable    Vital Signs Last 24 Hrs  T(C): 37 (2023 07:55), Max: 37 (2023 07:55)  T(F): 98.6 (2023 07:55), Max: 98.6 (2023 07:55)  HR: 57 (2023 07:55) (57 - 86)  BP: 153/75 (2023 07:55) (99/69 - 158/86)  BP(mean): 80 (2023 23:10) (80 - 80)  RR: 18 (2023 07:55) (17 - 18)  SpO2: 97% (2023 07:55) (97% - 99%)    Parameters below as of 2023 07:55  Patient On (Oxygen Delivery Method): room air        Constitutional: NAD  Respiratory: CTAB  Cardiovascular: S1 and S2, RRR  Gastrointestinal: BS+, soft, midl TTP of abdomen diffusely, no distended, obese  Extremities: No peripheral edema  Psychiatric: Normal mood, normal affect  Skin: No rashes    LABS:                        13.0   16.65 )-----------( 356      ( 2023 06:50 )             39.6     -28    138  |  110<H>  |  15  ----------------------------<  126<H>  3.4<L>   |  25  |  0.54    Ca    8.6      2023 06:50  Phos  3.0       Mg     2.2         TPro  6.9  /  Alb  3.3  /  TBili  0.5  /  DBili  x   /  AST  11<L>  /  ALT  22  /  AlkPhos  65  28      LIVER FUNCTIONS - ( 2023 06:50 )  Alb: 3.3 g/dL / Pro: 6.9 gm/dL / ALK PHOS: 65 U/L / ALT: 22 U/L / AST: 11 U/L / GGT: x             RADIOLOGY & ADDITIONAL STUDIES:    ACC: [id number] EXAM:  CT ABDOMEN AND PELVIS IC   ORDERED BY: MARIE SALEEM     PROCEDURE DATE:  2023          INTERPRETATION:  CLINICAL INFORMATION: Abdominal pain    COMPARISON: 2021    CONTRAST/COMPLICATIONS:  IV Contrast: Omnipaque 350  90 cc administered   10 cc discarded  Oral Contrast: NONE  Complications: None reported at time of study completion    PROCEDURE:  CT of the Abdomen and Pelvis was performed.  Sagittal and coronal reformats were performed.    FINDINGS:  LOWER CHEST: Small bilateral atelectasis.    LIVER: Small hypodense lesion in the left hepatic lobe, too small to   characterize.  BILE DUCTS: Grossly stable dilated common bile duct is again seen,   probably due to post cholecystectomy status.  GALLBLADDER:Stable cholecystectomy clips.  SPLEEN: Within normal limits.  PANCREAS: Within normal limits.  ADRENALS: Stable mild nodular thickening of the right adrenal gland.   Stable nonspecific 1.1 cm left adrenal nodule.  KIDNEYS/URETERS: No evidence for a ureteral calculus. No hydronephrosis.   A few indeterminate hypodense lesions in the kidneys bilaterally   measuring up to 2.1 cm on the left. If clinically indicated, abdominal MR   without and with IV contrast may be pursued for further evaluation.    BLADDER: Within normal limits.  REPRODUCTIVE ORGANS: The uterus is again not visualized, likely   surgically absent. The adnexae appear grossly unremarkable.    BOWEL: No bowel obstruction. Appendix within normal limits. Apparent mild   focal mural thickening at the rectosigmoid junction with adjacent   diverticulosis and stranding, compatible with diverticulitis. No   associated abscess is identified.  PERITONEUM: No free air or ascites.  VESSELS: Calcified atherosclerotic disease.  RETROPERITONEUM/LYMPH NODES: No lymphadenopathy.  ABDOMINAL WALL: Within normal limits.  BONES: Orthopedic hardware at the lower lumbar spine. Degenerative   spondylosis. Grade 1 anterolisthesis at L3-4 and L5-S1.    IMPRESSION:  Apparent mild focal mural thickening at the rectosigmoid junction with   adjacent diverticulosis and stranding, compatible with diverticulitis. No   associated abscess is identified. Follow-up colonoscopy after   treatment/resolution of acute disease may be pursued to rule out colon  cancer.    Grossly stable dilated common bile duct is again seen, probably due to   post cholecystectomy status. If there is a clinical suspicion for biliary   obstruction, MRCP may be pursued for further evaluation.    A few indeterminate hypodense lesions in the kidneys bilaterally   measuring up to 2.1 cm on the left. If clinically indicated, nonemergent   abdominal MR without and with IV contrast may be pursued for further   evaluation.    --- End of Report ---            DEE DEE VILLAFUERTE MD; Attending Radiologist  This document has been electronically signed. 2023  5:38PM

## 2023-04-28 NOTE — CONSULT NOTE ADULT - ASSESSMENT
1. Uncomplicated diverticulitis    Recommendation  1. Continue with antibiotics with eventual transition to oral for total 10 day course  2. Slowly advance diet as tolerated to low residue for 4 weeks followed by high fiber diet thereafter  3. Dr. Childs to follow

## 2023-04-28 NOTE — PROGRESS NOTE ADULT - ATTENDING COMMENTS
-[id number]-[id number], cta  -p/a-soft,  tender on deep palpation  -cvs-s1s2 normal     A/P    #ct abx, supportive care

## 2023-04-28 NOTE — PROGRESS NOTE ADULT - SUBJECTIVE AND OBJECTIVE BOX
51 yo F w PMH of pancreatitis, morbid obesity, fibromyalgia, anxiety, OA, cyclical vomiting syndrome presenting for intractable abd pain for 2 days. Pt states as of 2 days ago, she started experiencing severe diffuse abd pain associated w diarrhea (3 episodes yesterday, 4 today, non-bloody), vomiting every 20 minutes (non-bloody, + bilous), subjective fevers and chills at home. Denies recent travel, sick contacts, or changes in diet. Pt states she’s been unable to keep down any food or medications.       In ED: Given Cipro and Metronidazole, S/P 2L NS, Zofran, Hydromorphone 1mg x 1 along w Ketorolac 30 mg x 1. Labs notable for WBC 19 w neutrophilic predominance, Plts 414. CT Abd: Apparent mild focal mural thickening at the rectosigmoid junction with adjacent diverticulosis and stranding, compatible with diverticulitis. No associated abscess is identified.      Past Admissions: Visited ED on Oct/2022 for mechanical fall, found to have distal right radius fracture, bandage placed by Ortho and recommended to F/U as outpt.     4/28 spoke with pt, says diverticulitis 4 years ago, resolved with antibiotics. remains nauseous, and pain at left lower quadrant pain     REVIEW OF SYSTEMS:  CONSTITUTIONAL: No weakness, fevers or chills  EYES/ENT: No visual changes;  No vertigo or throat pain   NECK: No pain or stiffness  RESPIRATORY: No cough, wheezing, hemoptysis; No shortness of breath  CARDIOVASCULAR: No chest pain or palpitations  GASTROINTESTINAL: left lower quadrant pain ,nausea   GENITOURINARY: No dysuria, frequency or hematuria  NEUROLOGICAL: No numbness or weakness  SKIN: No itching, burning, rashes, or lesions     PHYSICAL EXAM:  Vital Signs Last 24 Hrs  T(C): 37 (28 Apr 2023 07:55), Max: 37 (28 Apr 2023 07:55)  T(F): 98.6 (28 Apr 2023 07:55), Max: 98.6 (28 Apr 2023 07:55)  HR: 57 (28 Apr 2023 07:55) (57 - 86)  BP: 153/75 (28 Apr 2023 07:55) (99/69 - 158/86)  BP(mean): 80 (27 Apr 2023 23:10) (80 - 97)  RR: 18 (28 Apr 2023 07:55) (17 - 18)  SpO2: 97% (28 Apr 2023 07:55) (96% - 99%)    Parameters below as of 28 Apr 2023 07:55  Patient On (Oxygen Delivery Method): room air        Constitutional: Pt lying in bed, awake and alert, NAD  HEENT: EOMI, normal hearing, moist mucous membranes  Neck: Soft and supple, no JVD  Respiratory: CTABL, No wheezing, rales or rhonchi  Cardiovascular: S1S2+, RRR, no M/G/R  Gastrointestinal: hyperactive BS. left lower quadrant tenderness, nausea  Extremities: No peripheral edema  Vascular: 2+ peripheral pulses  Neurological: AAOx3, no focal deficits  Musculoskeletal: 5/5 strength b/l upper and lower extremities  Skin: No rashes    MEDICATIONS:  MEDICATIONS  (STANDING):  enoxaparin Injectable 40 milliGRAM(s) SubCutaneous every 12 hours  famotidine Injectable 20 milliGRAM(s) IV Push two times a day  lactated ringers. 1000 milliLiter(s) (100 mL/Hr) IV Continuous <Continuous>  naloxone Injectable 0.4 milliGRAM(s) IV Push once  pantoprazole    Tablet 40 milliGRAM(s) Oral before breakfast  piperacillin/tazobactam IVPB.. 3.375 Gram(s) IV Intermittent every 6 hours    MEDICATIONS  (PRN):  HYDROmorphone  Injectable 1 milliGRAM(s) IV Push every 4 hours PRN Severe Pain (7 - 10)  ondansetron Injectable 4 milliGRAM(s) IV Push every 4 hours PRN Nausea and/or Vomiting  oxyCODONE    IR 15 milliGRAM(s) Oral four times a day PRN Moderate Pain (4 - 6)      LABS: All Labs Reviewed:                        13.0   16.65 )-----------( 356      ( 28 Apr 2023 06:50 )             39.6     04-28    138  |  110<H>  |  15  ----------------------------<  126<H>  3.4<L>   |  25  |  0.54    Ca    8.6      28 Apr 2023 06:50  Phos  3.0     04-28  Mg     2.2     04-28    TPro  6.9  /  Alb  3.3  /  TBili  0.5  /  DBili  x   /  AST  11<L>  /  ALT  22  /  AlkPhos  65  04-28          Blood Culture:   I&O's Summary    CAPILLARY BLOOD GLUCOSE          RADIOLOGY/EKG:    Sagittal and coronal reformats were performed.    FINDINGS:  LOWER CHEST: Small bilateral atelectasis.    LIVER: Small hypodense lesion in the left hepatic lobe, too small to characterize.  BILE DUCTS: Grossly stable dilated common bile duct is again seen, probably due to post cholecystectomy status.  GALLBLADDER: Stable cholecystectomy clips.  SPLEEN: Within normal limits.  PANCREAS: Within normal limits.  ADRENALS: Stable mild nodular thickening of the right adrenal gland. Stable nonspecific 1.1 cm left adrenal nodule.  KIDNEYS/URETERS: No evidence for a ureteral calculus. No hydronephrosis. A few indeterminate hypodense lesions in the kidneys bilaterally measuring up to 2.1 cm on the left. If clinically indicated, abdominal MR without and with IV contrast may be pursued for further evaluation.    BLADDER: Within normal limits.  REPRODUCTIVE ORGANS: The uterus is again not visualized, likely surgically absent. The adnexae appear grossly unremarkable.    BOWEL: No bowel obstruction. Appendix within normal limits. Apparent mild focal mural thickening at the rectosigmoid junction with adjacent diverticulosis and stranding, compatible with diverticulitis. No associated abscess is identified.  PERITONEUM: No free air or ascites.  VESSELS: Calcified atherosclerotic disease.  RETROPERITONEUM/LYMPH NODES: No lymphadenopathy.  ABDOMINAL WALL: Within normal limits.  BONES: Orthopedic hardware at the lower lumbar spine. Degenerative spondylosis. Grade 1 anterolisthesis at L3-4 and L5-S1.    IMPRESSION:  Apparent mild focal mural thickening at the rectosigmoid junction with adjacent diverticulosis and stranding, compatible with diverticulitis. No associated abscess is identified. Follow-up colonoscopy after treatment/resolution of acute disease may be pursued to rule out colon cancer.    Grossly stable dilated common bile duct is again seen, probably due to post cholecystectomy status. If there is a clinical suspicion for biliary obstruction, MRCP may be pursued for further evaluation.    A few indeterminate hypodense lesions in the kidneys bilaterally measuring up to 2.1 cm on the left. If clinically indicated, nonemergent abdominal MR without and with IV contrast may be pursued for further evaluation.    --- End of Report ---

## 2023-04-29 LAB
ALBUMIN SERPL ELPH-MCNC: 3.2 G/DL — LOW (ref 3.3–5)
ALP SERPL-CCNC: 57 U/L — SIGNIFICANT CHANGE UP (ref 40–120)
ALT FLD-CCNC: 34 U/L — SIGNIFICANT CHANGE UP (ref 12–78)
ANION GAP SERPL CALC-SCNC: 2 MMOL/L — LOW (ref 5–17)
AST SERPL-CCNC: 13 U/L — LOW (ref 15–37)
BILIRUB SERPL-MCNC: 0.6 MG/DL — SIGNIFICANT CHANGE UP (ref 0.2–1.2)
BUN SERPL-MCNC: 11 MG/DL — SIGNIFICANT CHANGE UP (ref 7–23)
CALCIUM SERPL-MCNC: 8.6 MG/DL — SIGNIFICANT CHANGE UP (ref 8.5–10.1)
CHLORIDE SERPL-SCNC: 111 MMOL/L — HIGH (ref 96–108)
CO2 SERPL-SCNC: 27 MMOL/L — SIGNIFICANT CHANGE UP (ref 22–31)
CREAT SERPL-MCNC: 0.67 MG/DL — SIGNIFICANT CHANGE UP (ref 0.5–1.3)
EGFR: 106 ML/MIN/1.73M2 — SIGNIFICANT CHANGE UP
GLUCOSE SERPL-MCNC: 114 MG/DL — HIGH (ref 70–99)
HCT VFR BLD CALC: 37.7 % — SIGNIFICANT CHANGE UP (ref 34.5–45)
HGB BLD-MCNC: 12.3 G/DL — SIGNIFICANT CHANGE UP (ref 11.5–15.5)
MCHC RBC-ENTMCNC: 28.1 PG — SIGNIFICANT CHANGE UP (ref 27–34)
MCHC RBC-ENTMCNC: 32.6 GM/DL — SIGNIFICANT CHANGE UP (ref 32–36)
MCV RBC AUTO: 86.3 FL — SIGNIFICANT CHANGE UP (ref 80–100)
PLATELET # BLD AUTO: 308 K/UL — SIGNIFICANT CHANGE UP (ref 150–400)
POTASSIUM SERPL-MCNC: 3.5 MMOL/L — SIGNIFICANT CHANGE UP (ref 3.5–5.3)
POTASSIUM SERPL-SCNC: 3.5 MMOL/L — SIGNIFICANT CHANGE UP (ref 3.5–5.3)
PROT SERPL-MCNC: 6.4 GM/DL — SIGNIFICANT CHANGE UP (ref 6–8.3)
RBC # BLD: 4.37 M/UL — SIGNIFICANT CHANGE UP (ref 3.8–5.2)
RBC # FLD: 14.2 % — SIGNIFICANT CHANGE UP (ref 10.3–14.5)
SODIUM SERPL-SCNC: 140 MMOL/L — SIGNIFICANT CHANGE UP (ref 135–145)
WBC # BLD: 13.92 K/UL — HIGH (ref 3.8–10.5)
WBC # FLD AUTO: 13.92 K/UL — HIGH (ref 3.8–10.5)

## 2023-04-29 PROCEDURE — 99233 SBSQ HOSP IP/OBS HIGH 50: CPT | Mod: GC

## 2023-04-29 PROCEDURE — 99231 SBSQ HOSP IP/OBS SF/LOW 25: CPT

## 2023-04-29 RX ORDER — HYDROMORPHONE HYDROCHLORIDE 2 MG/ML
2 INJECTION INTRAMUSCULAR; INTRAVENOUS; SUBCUTANEOUS EVERY 6 HOURS
Refills: 0 | Status: DISCONTINUED | OUTPATIENT
Start: 2023-04-29 | End: 2023-05-01

## 2023-04-29 RX ADMIN — HYDROMORPHONE HYDROCHLORIDE 2 MILLIGRAM(S): 2 INJECTION INTRAMUSCULAR; INTRAVENOUS; SUBCUTANEOUS at 17:42

## 2023-04-29 RX ADMIN — SODIUM CHLORIDE 100 MILLILITER(S): 9 INJECTION INTRAMUSCULAR; INTRAVENOUS; SUBCUTANEOUS at 04:57

## 2023-04-29 RX ADMIN — ONDANSETRON 4 MILLIGRAM(S): 8 TABLET, FILM COATED ORAL at 02:53

## 2023-04-29 RX ADMIN — OXYCODONE HYDROCHLORIDE 15 MILLIGRAM(S): 5 TABLET ORAL at 21:51

## 2023-04-29 RX ADMIN — HYDROMORPHONE HYDROCHLORIDE 2 MILLIGRAM(S): 2 INJECTION INTRAMUSCULAR; INTRAVENOUS; SUBCUTANEOUS at 03:10

## 2023-04-29 RX ADMIN — PIPERACILLIN AND TAZOBACTAM 25 GRAM(S): 4; .5 INJECTION, POWDER, LYOPHILIZED, FOR SOLUTION INTRAVENOUS at 09:42

## 2023-04-29 RX ADMIN — FAMOTIDINE 20 MILLIGRAM(S): 10 INJECTION INTRAVENOUS at 09:42

## 2023-04-29 RX ADMIN — HYDROMORPHONE HYDROCHLORIDE 2 MILLIGRAM(S): 2 INJECTION INTRAMUSCULAR; INTRAVENOUS; SUBCUTANEOUS at 11:06

## 2023-04-29 RX ADMIN — FAMOTIDINE 20 MILLIGRAM(S): 10 INJECTION INTRAVENOUS at 21:06

## 2023-04-29 RX ADMIN — OXYCODONE HYDROCHLORIDE 15 MILLIGRAM(S): 5 TABLET ORAL at 22:30

## 2023-04-29 RX ADMIN — OXYCODONE HYDROCHLORIDE 15 MILLIGRAM(S): 5 TABLET ORAL at 13:51

## 2023-04-29 RX ADMIN — OXYCODONE HYDROCHLORIDE 15 MILLIGRAM(S): 5 TABLET ORAL at 14:31

## 2023-04-29 RX ADMIN — ENOXAPARIN SODIUM 40 MILLIGRAM(S): 100 INJECTION SUBCUTANEOUS at 21:06

## 2023-04-29 RX ADMIN — ONDANSETRON 4 MILLIGRAM(S): 8 TABLET, FILM COATED ORAL at 06:55

## 2023-04-29 RX ADMIN — ONDANSETRON 4 MILLIGRAM(S): 8 TABLET, FILM COATED ORAL at 11:06

## 2023-04-29 RX ADMIN — PIPERACILLIN AND TAZOBACTAM 25 GRAM(S): 4; .5 INJECTION, POWDER, LYOPHILIZED, FOR SOLUTION INTRAVENOUS at 02:09

## 2023-04-29 RX ADMIN — HYDROMORPHONE HYDROCHLORIDE 2 MILLIGRAM(S): 2 INJECTION INTRAMUSCULAR; INTRAVENOUS; SUBCUTANEOUS at 06:54

## 2023-04-29 RX ADMIN — ONDANSETRON 4 MILLIGRAM(S): 8 TABLET, FILM COATED ORAL at 21:49

## 2023-04-29 RX ADMIN — ONDANSETRON 4 MILLIGRAM(S): 8 TABLET, FILM COATED ORAL at 17:37

## 2023-04-29 RX ADMIN — HYDROMORPHONE HYDROCHLORIDE 2 MILLIGRAM(S): 2 INJECTION INTRAMUSCULAR; INTRAVENOUS; SUBCUTANEOUS at 02:53

## 2023-04-29 RX ADMIN — PIPERACILLIN AND TAZOBACTAM 25 GRAM(S): 4; .5 INJECTION, POWDER, LYOPHILIZED, FOR SOLUTION INTRAVENOUS at 21:06

## 2023-04-29 RX ADMIN — HYDROMORPHONE HYDROCHLORIDE 2 MILLIGRAM(S): 2 INJECTION INTRAMUSCULAR; INTRAVENOUS; SUBCUTANEOUS at 11:21

## 2023-04-29 RX ADMIN — HYDROMORPHONE HYDROCHLORIDE 2 MILLIGRAM(S): 2 INJECTION INTRAMUSCULAR; INTRAVENOUS; SUBCUTANEOUS at 23:58

## 2023-04-29 RX ADMIN — ENOXAPARIN SODIUM 40 MILLIGRAM(S): 100 INJECTION SUBCUTANEOUS at 09:43

## 2023-04-29 RX ADMIN — HYDROMORPHONE HYDROCHLORIDE 2 MILLIGRAM(S): 2 INJECTION INTRAMUSCULAR; INTRAVENOUS; SUBCUTANEOUS at 07:10

## 2023-04-29 RX ADMIN — PANTOPRAZOLE SODIUM 40 MILLIGRAM(S): 20 TABLET, DELAYED RELEASE ORAL at 06:19

## 2023-04-29 RX ADMIN — PIPERACILLIN AND TAZOBACTAM 25 GRAM(S): 4; .5 INJECTION, POWDER, LYOPHILIZED, FOR SOLUTION INTRAVENOUS at 13:43

## 2023-04-29 NOTE — PROGRESS NOTE ADULT - SUBJECTIVE AND OBJECTIVE BOX
Patient is a 52y old  Female who presents with a chief complaint of Abdominal Pain (2023 15:24)      HPI:  51 yo F w PMH of pancreatitis, morbid obesity, fibromyalgia, anxiety, OA, cyclical vomiting syndrome presenting for intractable abd pain for 2 days. Pt states as of 2 days ago, she started experiencing severe diffuse abd pain associated w diarrhea (3 episodes yesterday, 4 today, non-bloody), vomiting every 20 minutes (non-bloody, + bilous), subjective fevers and chills at home. Denies recent travel, sick contacts, or changes in diet. Pt states she’s been unable to keep down any food or medications.       In ED: Given Cipro and Metronidazole, S/P 2L NS, Zofran, Hydromorphone 1mg x 1 along w Ketorolac 30 mg x 1. Labs notable for WBC 19 w neutrophilic predominance, Plts 414. CT Abd: Apparent mild focal mural thickening at the rectosigmoid junction with adjacent diverticulosis and stranding, compatible with diverticulitis. No associated abscess is identified.      Past Admissions: Visited ED on Oct/2022 for mechanical fall, found to have distal right radius fracture, bandage placed by Ortho and recommended to F/U as outpt.     Improved on . Less pain. No further diarrhea or fevers.       (2023 22:18)      PAST MEDICAL & SURGICAL HISTORY:  Fibromyalgia      Anxiety      Arthritis      Breast mass      Cyclical vomiting      Renal stone  left      History of rib fracture  right      H/O umbilical hernia repair  2017      H/O: hysterectomy  2017      S/P   2003, 2004      History of lumbar fusion  2011      Carpal tunnel syndrome of left wrist  2006      Carpal tunnel syndrome of right wrist  2007      History of cholecystectomy  1992      Breast cyst  right  (x2)     (last one)          MEDICATIONS  (STANDING):  enoxaparin Injectable 40 milliGRAM(s) SubCutaneous every 12 hours  famotidine Injectable 20 milliGRAM(s) IV Push two times a day  naloxone Injectable 0.4 milliGRAM(s) IV Push once  pantoprazole    Tablet 40 milliGRAM(s) Oral before breakfast  piperacillin/tazobactam IVPB.. 3.375 Gram(s) IV Intermittent every 6 hours  sodium chloride 0.9%. 1000 milliLiter(s) (100 mL/Hr) IV Continuous <Continuous>    MEDICATIONS  (PRN):  HYDROmorphone  Injectable 2 milliGRAM(s) IV Push every 4 hours PRN Severe Pain (7 - 10)  ondansetron Injectable 4 milliGRAM(s) IV Push every 4 hours PRN Nausea and/or Vomiting  oxyCODONE    IR 15 milliGRAM(s) Oral four times a day PRN Moderate Pain (4 - 6)      Allergies    No Known Allergies    Intolerances          Vital Signs Last 24 Hrs  T(C): 36.7 (2023 08:05), Max: 37.2 (2023 14:36)  T(F): 98.1 (2023 08:05), Max: 99 (2023 14:36)  HR: 53 (2023 08:05) (53 - 58)  BP: 116/68 (2023 08:05) (116/68 - 156/88)  BP(mean): --  RR: 18 (2023 08:05) (17 - 18)  SpO2: 96% (2023 08:05) (95% - 97%)    Parameters below as of 2023 08:05  Patient On (Oxygen Delivery Method): room air        PHYSICAL EXAM:      Respiratory: CTAB  Cardiovascular: S1 and S2, RRR, no M/G/R  Gastrointestinal: BS+, soft, tender in LLQ without rebound    LABS:                        12.3   13.92 )-----------( 308      ( 2023 06:36 )             37.7     04-29    140  |  111<H>  |  11  ----------------------------<  114<H>  3.5   |  27  |  0.67    Ca    8.6      2023 06:36  Phos  3.0     04-28  Mg     2.2     04-28    TPro  6.4  /  Alb  3.2<L>  /  TBili  0.6  /  DBili  x   /  AST  13<L>  /  ALT  34  /  AlkPhos  57  04-29      LIVER FUNCTIONS - ( 2023 06:36 )  Alb: 3.2 g/dL / Pro: 6.4 gm/dL / ALK PHOS: 57 U/L / ALT: 34 U/L / AST: 13 U/L / GGT: x             RADIOLOGY & ADDITIONAL STUDIES:

## 2023-04-29 NOTE — PROGRESS NOTE ADULT - SUBJECTIVE AND OBJECTIVE BOX
53 yo F w PMH of pancreatitis, morbid obesity, fibromyalgia, anxiety, OA, cyclical vomiting syndrome presenting for intractable abd pain for 2 days. Pt states as of 2 days ago, she started experiencing severe diffuse abd pain associated w diarrhea (3 episodes yesterday, 4 today, non-bloody), vomiting every 20 minutes (non-bloody, + bilous), subjective fevers and chills at home. Denies recent travel, sick contacts, or changes in diet. Pt states she’s been unable to keep down any food or medications.       In ED: Given Cipro and Metronidazole, S/P 2L NS, Zofran, Hydromorphone 1mg x 1 along w Ketorolac 30 mg x 1. Labs notable for WBC 19 w neutrophilic predominance, Plts 414. CT Abd: Apparent mild focal mural thickening at the rectosigmoid junction with adjacent diverticulosis and stranding, compatible with diverticulitis. No associated abscess is identified.      Past Admissions: Visited ED on Oct/2022 for mechanical fall, found to have distal right radius fracture, bandage placed by Ortho and recommended to F/U as outpt.     4/29 Improving pain, tolerating clear liquid diet.     REVIEW OF SYSTEMS:  CONSTITUTIONAL: No weakness, fevers or chills  EYES/ENT: No visual changes;  No vertigo or throat pain   NECK: No pain or stiffness  RESPIRATORY: No cough, wheezing, hemoptysis; No shortness of breath  CARDIOVASCULAR: No chest pain or palpitations  GASTROINTESTINAL: left lower quadrant pain ,nausea   GENITOURINARY: No dysuria, frequency or hematuria  NEUROLOGICAL: No numbness or weakness  SKIN: No itching, burning, rashes, or lesions     PHYSICAL EXAM:  Vital Signs Last 24 Hrs  T(C): 36.7 (29 Apr 2023 08:05), Max: 37.2 (28 Apr 2023 14:36)  T(F): 98.1 (29 Apr 2023 08:05), Max: 99 (28 Apr 2023 14:36)  HR: 53 (29 Apr 2023 08:05) (53 - 58)  BP: 116/68 (29 Apr 2023 08:05) (116/68 - 156/88)  BP(mean): --  RR: 18 (29 Apr 2023 08:05) (17 - 18)  SpO2: 96% (29 Apr 2023 08:05) (95% - 97%)    Parameters below as of 29 Apr 2023 08:05  Patient On (Oxygen Delivery Method): room air      Constitutional: Pt lying in bed, awake and alert, NAD  HEENT: EOMI, normal hearing, moist mucous membranes  Neck: Soft and supple, no JVD  Respiratory: CTABL, No wheezing, rales or rhonchi  Cardiovascular: S1S2+, RRR, no M/G/R  Gastrointestinal: normal BS. mild lower quadrant tenderness  Extremities: No peripheral edema  Vascular: 2+ peripheral pulses  Neurological: AAOx3, no focal deficits  Musculoskeletal: 5/5 strength b/l upper and lower extremities  Skin: No rashes    MEDICATIONS  (STANDING):  enoxaparin Injectable 40 milliGRAM(s) SubCutaneous every 12 hours  famotidine Injectable 20 milliGRAM(s) IV Push two times a day  naloxone Injectable 0.4 milliGRAM(s) IV Push once  pantoprazole    Tablet 40 milliGRAM(s) Oral before breakfast  piperacillin/tazobactam IVPB.. 3.375 Gram(s) IV Intermittent every 6 hours  sodium chloride 0.9%. 1000 milliLiter(s) (100 mL/Hr) IV Continuous <Continuous>                              12.3   13.92 )-----------( 308      ( 29 Apr 2023 06:36 )             37.7     29 Apr 2023 06:36    140    |  111    |  11     ----------------------------<  114    3.5     |  27     |  0.67     Ca    8.6        29 Apr 2023 06:36  Phos  3.0       28 Apr 2023 06:50  Mg     2.2       28 Apr 2023 06:50    TPro  6.4    /  Alb  3.2    /  TBili  0.6    /  DBili  x      /  AST  13     /  ALT  34     /  AlkPhos  57     29 Apr 2023 06:36      CAPILLARY BLOOD GLUCOSE        LIVER FUNCTIONS - ( 29 Apr 2023 06:36 )  Alb: 3.2 g/dL / Pro: 6.4 gm/dL / ALK PHOS: 57 U/L / ALT: 34 U/L / AST: 13 U/L / GGT: x                     RADIOLOGY/EKG:    Sagittal and coronal reformats were performed.    FINDINGS:  LOWER CHEST: Small bilateral atelectasis.    LIVER: Small hypodense lesion in the left hepatic lobe, too small to characterize.  BILE DUCTS: Grossly stable dilated common bile duct is again seen, probably due to post cholecystectomy status.  GALLBLADDER: Stable cholecystectomy clips.  SPLEEN: Within normal limits.  PANCREAS: Within normal limits.  ADRENALS: Stable mild nodular thickening of the right adrenal gland. Stable nonspecific 1.1 cm left adrenal nodule.  KIDNEYS/URETERS: No evidence for a ureteral calculus. No hydronephrosis. A few indeterminate hypodense lesions in the kidneys bilaterally measuring up to 2.1 cm on the left. If clinically indicated, abdominal MR without and with IV contrast may be pursued for further evaluation.    BLADDER: Within normal limits.  REPRODUCTIVE ORGANS: The uterus is again not visualized, likely surgically absent. The adnexae appear grossly unremarkable.    BOWEL: No bowel obstruction. Appendix within normal limits. Apparent mild focal mural thickening at the rectosigmoid junction with adjacent diverticulosis and stranding, compatible with diverticulitis. No associated abscess is identified.  PERITONEUM: No free air or ascites.  VESSELS: Calcified atherosclerotic disease.  RETROPERITONEUM/LYMPH NODES: No lymphadenopathy.  ABDOMINAL WALL: Within normal limits.  BONES: Orthopedic hardware at the lower lumbar spine. Degenerative spondylosis. Grade 1 anterolisthesis at L3-4 and L5-S1.    IMPRESSION:  Apparent mild focal mural thickening at the rectosigmoid junction with adjacent diverticulosis and stranding, compatible with diverticulitis. No associated abscess is identified. Follow-up colonoscopy after treatment/resolution of acute disease may be pursued to rule out colon cancer.    Grossly stable dilated common bile duct is again seen, probably due to post cholecystectomy status. If there is a clinical suspicion for biliary obstruction, MRCP may be pursued for further evaluation.    A few indeterminate hypodense lesions in the kidneys bilaterally measuring up to 2.1 cm on the left. If clinically indicated, nonemergent abdominal MR without and with IV contrast may be pursued for further evaluation.    --- End of Report ---

## 2023-04-29 NOTE — PROGRESS NOTE ADULT - SUBJECTIVE AND OBJECTIVE BOX
Pt has been seen and examined with FP resident, resident supervised agree with a/p       Patient is a 52y old  Female who presents with a chief complaint of Abdominal Pain (29 Apr 2023 10:49)      HPI:  53 yo F w PMH of pancreatitis, morbid obesity, fibromyalgia, anxiety, OA, cyclical vomiting syndrome presenting for intractable abd pain for 2 days prior to admission       (27 Apr 2023 22:18)      PHYSICAL EXAM:  Vital Signs Last 24 Hrs  T(C): 36.7 (29 Apr 2023 08:05), Max: 37.2 (28 Apr 2023 14:36)  T(F): 98.1 (29 Apr 2023 08:05), Max: 99 (28 Apr 2023 14:36)  HR: 53 (29 Apr 2023 08:05) (53 - 58)  BP: 116/68 (29 Apr 2023 08:05) (116/68 - 156/88)  BP(mean): --  RR: 18 (29 Apr 2023 08:05) (17 - 18)  SpO2: 96% (29 Apr 2023 08:05) (95% - 97%)    Parameters below as of 29 Apr 2023 08:05  Patient On (Oxygen Delivery Method): room air      -[id number], cta  -cvs-[id number] normal   -p/a-soft,bs+      A/P    #ct abx, ,supportive care     #d/c plan

## 2023-04-29 NOTE — PROGRESS NOTE ADULT - ASSESSMENT
53 yo F w PMH of pancreatitis, morbid obesity, fibromyalgia, anxiety, OA, cyclical vomiting syndrome presenting for intractable abd pain for 2 days. Found to have diverticulitis.     #Severe Abdominal Pain due to Acute Uncomplicated Diverticulitis   -Pt presented w intractable abd pain, N/V and diarrhea   -CT scan demonstrates diverticulitis   -S/P Cipro and Metronidazole in ED   -Continue w Zosyn   -Lactate WNL  -GI consult recs apprecaited  -LR at 100 cc/hour   -Clear diet, advance as tolerated   -Monitor leukocytosis   - 4/29: Pain improving, tolerating clear diet, advance to full liquids today  -Pain controlled but only taking Dilaudid 2mg q4, not taking Oxy. Change Dilaudid 2mg to q6, encourage oxy to wean off IV.   -Plan to advance diet, continue Zosyn.    #Kidney lesions  -Small bilateral renal hypodensities noted on CT   -MRI w/wo contrast.   -Discussed results with patient, unknown etiology looking to investigate further. All questions answered. Patient has titanium plate from spinal surgery but has had prior MRIs, safe for continuing with MRI. Safety checklist filled out placed in chart.     #Diet   -Full liquids, advance as tolerated    #DVT PPX    -Lovenox sub Q     #Advanced Directives   -FULL CODE

## 2023-04-30 LAB
ALBUMIN SERPL ELPH-MCNC: 3 G/DL — LOW (ref 3.3–5)
ALP SERPL-CCNC: 56 U/L — SIGNIFICANT CHANGE UP (ref 40–120)
ALT FLD-CCNC: 38 U/L — SIGNIFICANT CHANGE UP (ref 12–78)
ANION GAP SERPL CALC-SCNC: 1 MMOL/L — LOW (ref 5–17)
AST SERPL-CCNC: 15 U/L — SIGNIFICANT CHANGE UP (ref 15–37)
BILIRUB SERPL-MCNC: 0.6 MG/DL — SIGNIFICANT CHANGE UP (ref 0.2–1.2)
BUN SERPL-MCNC: 12 MG/DL — SIGNIFICANT CHANGE UP (ref 7–23)
CALCIUM SERPL-MCNC: 8.2 MG/DL — LOW (ref 8.5–10.1)
CHLORIDE SERPL-SCNC: 112 MMOL/L — HIGH (ref 96–108)
CO2 SERPL-SCNC: 27 MMOL/L — SIGNIFICANT CHANGE UP (ref 22–31)
CREAT SERPL-MCNC: 0.67 MG/DL — SIGNIFICANT CHANGE UP (ref 0.5–1.3)
EGFR: 106 ML/MIN/1.73M2 — SIGNIFICANT CHANGE UP
GLUCOSE SERPL-MCNC: 99 MG/DL — SIGNIFICANT CHANGE UP (ref 70–99)
HCT VFR BLD CALC: 35.7 % — SIGNIFICANT CHANGE UP (ref 34.5–45)
HGB BLD-MCNC: 11.8 G/DL — SIGNIFICANT CHANGE UP (ref 11.5–15.5)
MCHC RBC-ENTMCNC: 28.6 PG — SIGNIFICANT CHANGE UP (ref 27–34)
MCHC RBC-ENTMCNC: 33.1 GM/DL — SIGNIFICANT CHANGE UP (ref 32–36)
MCV RBC AUTO: 86.4 FL — SIGNIFICANT CHANGE UP (ref 80–100)
PLATELET # BLD AUTO: 286 K/UL — SIGNIFICANT CHANGE UP (ref 150–400)
POTASSIUM SERPL-MCNC: 3.6 MMOL/L — SIGNIFICANT CHANGE UP (ref 3.5–5.3)
POTASSIUM SERPL-SCNC: 3.6 MMOL/L — SIGNIFICANT CHANGE UP (ref 3.5–5.3)
PROT SERPL-MCNC: 6.2 GM/DL — SIGNIFICANT CHANGE UP (ref 6–8.3)
RBC # BLD: 4.13 M/UL — SIGNIFICANT CHANGE UP (ref 3.8–5.2)
RBC # FLD: 14 % — SIGNIFICANT CHANGE UP (ref 10.3–14.5)
SODIUM SERPL-SCNC: 140 MMOL/L — SIGNIFICANT CHANGE UP (ref 135–145)
WBC # BLD: 12.24 K/UL — HIGH (ref 3.8–10.5)
WBC # FLD AUTO: 12.24 K/UL — HIGH (ref 3.8–10.5)

## 2023-04-30 PROCEDURE — 74183 MRI ABD W/O CNTR FLWD CNTR: CPT | Mod: 26

## 2023-04-30 PROCEDURE — 99233 SBSQ HOSP IP/OBS HIGH 50: CPT | Mod: GC

## 2023-04-30 RX ADMIN — PIPERACILLIN AND TAZOBACTAM 25 GRAM(S): 4; .5 INJECTION, POWDER, LYOPHILIZED, FOR SOLUTION INTRAVENOUS at 09:26

## 2023-04-30 RX ADMIN — PIPERACILLIN AND TAZOBACTAM 25 GRAM(S): 4; .5 INJECTION, POWDER, LYOPHILIZED, FOR SOLUTION INTRAVENOUS at 21:16

## 2023-04-30 RX ADMIN — HYDROMORPHONE HYDROCHLORIDE 2 MILLIGRAM(S): 2 INJECTION INTRAMUSCULAR; INTRAVENOUS; SUBCUTANEOUS at 01:07

## 2023-04-30 RX ADMIN — OXYCODONE HYDROCHLORIDE 15 MILLIGRAM(S): 5 TABLET ORAL at 10:15

## 2023-04-30 RX ADMIN — FAMOTIDINE 20 MILLIGRAM(S): 10 INJECTION INTRAVENOUS at 09:09

## 2023-04-30 RX ADMIN — FAMOTIDINE 20 MILLIGRAM(S): 10 INJECTION INTRAVENOUS at 21:16

## 2023-04-30 RX ADMIN — OXYCODONE HYDROCHLORIDE 15 MILLIGRAM(S): 5 TABLET ORAL at 16:54

## 2023-04-30 RX ADMIN — ENOXAPARIN SODIUM 40 MILLIGRAM(S): 100 INJECTION SUBCUTANEOUS at 21:16

## 2023-04-30 RX ADMIN — ONDANSETRON 4 MILLIGRAM(S): 8 TABLET, FILM COATED ORAL at 05:05

## 2023-04-30 RX ADMIN — ONDANSETRON 4 MILLIGRAM(S): 8 TABLET, FILM COATED ORAL at 11:59

## 2023-04-30 RX ADMIN — HYDROMORPHONE HYDROCHLORIDE 2 MILLIGRAM(S): 2 INJECTION INTRAMUSCULAR; INTRAVENOUS; SUBCUTANEOUS at 19:05

## 2023-04-30 RX ADMIN — HYDROMORPHONE HYDROCHLORIDE 2 MILLIGRAM(S): 2 INJECTION INTRAMUSCULAR; INTRAVENOUS; SUBCUTANEOUS at 12:20

## 2023-04-30 RX ADMIN — OXYCODONE HYDROCHLORIDE 15 MILLIGRAM(S): 5 TABLET ORAL at 09:11

## 2023-04-30 RX ADMIN — ONDANSETRON 4 MILLIGRAM(S): 8 TABLET, FILM COATED ORAL at 19:05

## 2023-04-30 RX ADMIN — SODIUM CHLORIDE 100 MILLILITER(S): 9 INJECTION INTRAMUSCULAR; INTRAVENOUS; SUBCUTANEOUS at 10:40

## 2023-04-30 RX ADMIN — ENOXAPARIN SODIUM 40 MILLIGRAM(S): 100 INJECTION SUBCUTANEOUS at 09:10

## 2023-04-30 RX ADMIN — HYDROMORPHONE HYDROCHLORIDE 2 MILLIGRAM(S): 2 INJECTION INTRAMUSCULAR; INTRAVENOUS; SUBCUTANEOUS at 06:46

## 2023-04-30 RX ADMIN — OXYCODONE HYDROCHLORIDE 15 MILLIGRAM(S): 5 TABLET ORAL at 17:23

## 2023-04-30 RX ADMIN — HYDROMORPHONE HYDROCHLORIDE 2 MILLIGRAM(S): 2 INJECTION INTRAMUSCULAR; INTRAVENOUS; SUBCUTANEOUS at 06:11

## 2023-04-30 RX ADMIN — PIPERACILLIN AND TAZOBACTAM 25 GRAM(S): 4; .5 INJECTION, POWDER, LYOPHILIZED, FOR SOLUTION INTRAVENOUS at 01:53

## 2023-04-30 RX ADMIN — PIPERACILLIN AND TAZOBACTAM 25 GRAM(S): 4; .5 INJECTION, POWDER, LYOPHILIZED, FOR SOLUTION INTRAVENOUS at 13:36

## 2023-04-30 RX ADMIN — HYDROMORPHONE HYDROCHLORIDE 2 MILLIGRAM(S): 2 INJECTION INTRAMUSCULAR; INTRAVENOUS; SUBCUTANEOUS at 11:59

## 2023-04-30 NOTE — PROGRESS NOTE ADULT - SUBJECTIVE AND OBJECTIVE BOX
53 yo F w PMH of pancreatitis, morbid obesity, fibromyalgia, anxiety, OA, cyclical vomiting syndrome presenting for intractable abd pain for 2 days. Pt states as of 2 days ago, she started experiencing severe diffuse abd pain associated w diarrhea (3 episodes yesterday, 4 today, non-bloody), vomiting every 20 minutes (non-bloody, + bilous), subjective fevers and chills at home. Denies recent travel, sick contacts, or changes in diet. Pt states she’s been unable to keep down any food or medications.       In ED: Given Cipro and Metronidazole, S/P 2L NS, Zofran, Hydromorphone 1mg x 1 along w Ketorolac 30 mg x 1. Labs notable for WBC 19 w neutrophilic predominance, Plts 414. CT Abd: Apparent mild focal mural thickening at the rectosigmoid junction with adjacent diverticulosis and stranding, compatible with diverticulitis. No associated abscess is identified.      Past Admissions: Visited ED on Oct/2022 for mechanical fall, found to have distal right radius fracture, bandage placed by Ortho and recommended to F/U as outpt.     4/29 Improving pain, tolerating clear liquid diet.   4/30 improving pain, currently dilaudid 2mg Q6 for severe pain, oxycodone 15mg Q6 for moderate pain, encouraged PO pain meds for dc planning> Pt verbalizes understanding, all questions/ concerns addressed        REVIEW OF SYSTEMS:  CONSTITUTIONAL: No weakness, fevers or chills  EYES/ENT: No visual changes;  No vertigo or throat pain   NECK: No pain or stiffness  RESPIRATORY: No cough, wheezing, hemoptysis; No shortness of breath  CARDIOVASCULAR: No chest pain or palpitations  GASTROINTESTINAL: left lower quadrant pain  GENITOURINARY: No dysuria, frequency or hematuria  NEUROLOGICAL: No numbness or weakness  SKIN: No itching, burning, rashes, or lesions     PHYSICAL EXAM:    Vital Signs Last 24 Hrs  T(C): 36.4 (30 Apr 2023 09:12), Max: 36.8 (29 Apr 2023 15:12)  T(F): 97.5 (30 Apr 2023 09:12), Max: 98.2 (29 Apr 2023 15:12)  HR: 49 (30 Apr 2023 09:12) (49 - 51)  BP: 123/54 (30 Apr 2023 09:12) (100/65 - 123/54)  BP(mean): --  RR: 18 (30 Apr 2023 09:12) (18 - 19)  SpO2: 96% (30 Apr 2023 09:12) (95% - 96%)    Parameters below as of 30 Apr 2023 09:12  Patient On (Oxygen Delivery Method): room air          Constitutional: Pt lying in bed, awake and alert, NAD  HEENT: EOMI, normal hearing, moist mucous membranes  Neck: Soft and supple, no JVD  Respiratory: CTABL, No wheezing, rales or rhonchi  Cardiovascular: S1S2+, RRR, no M/G/R  Gastrointestinal: normal BS. mild lower quadrant tenderness  Extremities: No peripheral edema  Neurological: AAOx3, no focal deficits  Musculoskeletal: 5/5 strength b/l upper and lower extremities  Skin: No rashes    MEDICATIONS  (STANDING):  enoxaparin Injectable 40 milliGRAM(s) SubCutaneous every 12 hours  famotidine Injectable 20 milliGRAM(s) IV Push two times a day  naloxone Injectable 0.4 milliGRAM(s) IV Push once  pantoprazole    Tablet 40 milliGRAM(s) Oral before breakfast  piperacillin/tazobactam IVPB.. 3.375 Gram(s) IV Intermittent every 6 hours  sodium chloride 0.9%. 1000 milliLiter(s) (100 mL/Hr) IV Continuous <Continuous>    MEDICATIONS  (PRN):  HYDROmorphone  Injectable 2 milliGRAM(s) IV Push every 6 hours PRN Severe Pain (7 - 10)  ondansetron Injectable 4 milliGRAM(s) IV Push every 4 hours PRN Nausea and/or Vomiting  oxyCODONE    IR 15 milliGRAM(s) Oral four times a day PRN Moderate Pain (4 - 6)                                       11.8   12.24 )-----------( 286      ( 30 Apr 2023 05:31 )             35.7   04-30    140  |  112<H>  |  12  ----------------------------<  99  3.6   |  27  |  0.67    Ca    8.2<L>      30 Apr 2023 05:31    TPro  6.2  /  Alb  3.0<L>  /  TBili  0.6  /  DBili  x   /  AST  15  /  ALT  38  /  AlkPhos  56  04-30    CAPILLARY BLOOD GLUCOSE        LIVER FUNCTIONS - ( 29 Apr 2023 06:36 )  Alb: 3.2 g/dL / Pro: 6.4 gm/dL / ALK PHOS: 57 U/L / ALT: 34 U/L / AST: 13 U/L / GGT: x                     RADIOLOGY/EKG:    Sagittal and coronal reformats were performed.    FINDINGS:  LOWER CHEST: Small bilateral atelectasis.    LIVER: Small hypodense lesion in the left hepatic lobe, too small to characterize.  BILE DUCTS: Grossly stable dilated common bile duct is again seen, probably due to post cholecystectomy status.  GALLBLADDER: Stable cholecystectomy clips.  SPLEEN: Within normal limits.  PANCREAS: Within normal limits.  ADRENALS: Stable mild nodular thickening of the right adrenal gland. Stable nonspecific 1.1 cm left adrenal nodule.  KIDNEYS/URETERS: No evidence for a ureteral calculus. No hydronephrosis. A few indeterminate hypodense lesions in the kidneys bilaterally measuring up to 2.1 cm on the left. If clinically indicated, abdominal MR without and with IV contrast may be pursued for further evaluation.    BLADDER: Within normal limits.  REPRODUCTIVE ORGANS: The uterus is again not visualized, likely surgically absent. The adnexae appear grossly unremarkable.    BOWEL: No bowel obstruction. Appendix within normal limits. Apparent mild focal mural thickening at the rectosigmoid junction with adjacent diverticulosis and stranding, compatible with diverticulitis. No associated abscess is identified.  PERITONEUM: No free air or ascites.  VESSELS: Calcified atherosclerotic disease.  RETROPERITONEUM/LYMPH NODES: No lymphadenopathy.  ABDOMINAL WALL: Within normal limits.  BONES: Orthopedic hardware at the lower lumbar spine. Degenerative spondylosis. Grade 1 anterolisthesis at L3-4 and L5-S1.    IMPRESSION:  Apparent mild focal mural thickening at the rectosigmoid junction with adjacent diverticulosis and stranding, compatible with diverticulitis. No associated abscess is identified. Follow-up colonoscopy after treatment/resolution of acute disease may be pursued to rule out colon cancer.    Grossly stable dilated common bile duct is again seen, probably due to post cholecystectomy status. If there is a clinical suspicion for biliary obstruction, MRCP may be pursued for further evaluation.    A few indeterminate hypodense lesions in the kidneys bilaterally measuring up to 2.1 cm on the left. If clinically indicated, nonemergent abdominal MR without and with IV contrast may be pursued for further evaluation.    --- End of Report ---       51 yo F w PMH of pancreatitis, morbid obesity, fibromyalgia, anxiety, OA, cyclical vomiting syndrome presenting for intractable abd pain for 2 days. Pt states as of 2 days ago, she started experiencing severe diffuse abd pain associated w diarrhea (3 episodes yesterday, 4 today, non-bloody), vomiting every 20 minutes (non-bloody, + bilous), subjective fevers and chills at home. Denies recent travel, sick contacts, or changes in diet. Pt states she’s been unable to keep down any food or medications.       In ED: Given Cipro and Metronidazole, S/P 2L NS, Zofran, Hydromorphone 1mg x 1 along w Ketorolac 30 mg x 1. Labs notable for WBC 19 w neutrophilic predominance, Plts 414. CT Abd: Apparent mild focal mural thickening at the rectosigmoid junction with adjacent diverticulosis and stranding, compatible with diverticulitis. No associated abscess is identified.      Past Admissions: Visited ED on Oct/2022 for mechanical fall, found to have distal right radius fracture, bandage placed by Ortho and recommended to F/U as outpt.     4/29 Improving pain, tolerating clear liquid diet.   4/30 improving pain, currently dilaudid 2mg Q6 for severe pain, oxycodone 15mg Q6 for moderate pain, encouraged PO pain meds for dc planning> Pt verbalizes understanding, all questions/ concerns addressed. At the moment, pt unwilling to dc IV pain meds due to pain.         REVIEW OF SYSTEMS:  CONSTITUTIONAL: No weakness, fevers or chills  EYES/ENT: No visual changes;  No vertigo or throat pain   NECK: No pain or stiffness  RESPIRATORY: No cough, wheezing, hemoptysis; No shortness of breath  CARDIOVASCULAR: No chest pain or palpitations  GASTROINTESTINAL: left lower quadrant pain  GENITOURINARY: No dysuria, frequency or hematuria  NEUROLOGICAL: No numbness or weakness  SKIN: No itching, burning, rashes, or lesions     PHYSICAL EXAM:    Vital Signs Last 24 Hrs  T(C): 36.4 (30 Apr 2023 09:12), Max: 36.8 (29 Apr 2023 15:12)  T(F): 97.5 (30 Apr 2023 09:12), Max: 98.2 (29 Apr 2023 15:12)  HR: 49 (30 Apr 2023 09:12) (49 - 51)  BP: 123/54 (30 Apr 2023 09:12) (100/65 - 123/54)  BP(mean): --  RR: 18 (30 Apr 2023 09:12) (18 - 19)  SpO2: 96% (30 Apr 2023 09:12) (95% - 96%)    Parameters below as of 30 Apr 2023 09:12  Patient On (Oxygen Delivery Method): room air          Constitutional: Pt lying in bed, awake and alert, NAD  HEENT: EOMI, normal hearing, moist mucous membranes  Neck: Soft and supple, no JVD  Respiratory: CTABL, No wheezing, rales or rhonchi  Cardiovascular: S1S2+, RRR, no M/G/R  Gastrointestinal: normal BS. mild lower quadrant tenderness  Extremities: No peripheral edema  Neurological: AAOx3, no focal deficits  Musculoskeletal: 5/5 strength b/l upper and lower extremities  Skin: No rashes    MEDICATIONS  (STANDING):  enoxaparin Injectable 40 milliGRAM(s) SubCutaneous every 12 hours  famotidine Injectable 20 milliGRAM(s) IV Push two times a day  naloxone Injectable 0.4 milliGRAM(s) IV Push once  pantoprazole    Tablet 40 milliGRAM(s) Oral before breakfast  piperacillin/tazobactam IVPB.. 3.375 Gram(s) IV Intermittent every 6 hours  sodium chloride 0.9%. 1000 milliLiter(s) (100 mL/Hr) IV Continuous <Continuous>    MEDICATIONS  (PRN):  HYDROmorphone  Injectable 2 milliGRAM(s) IV Push every 6 hours PRN Severe Pain (7 - 10)  ondansetron Injectable 4 milliGRAM(s) IV Push every 4 hours PRN Nausea and/or Vomiting  oxyCODONE    IR 15 milliGRAM(s) Oral four times a day PRN Moderate Pain (4 - 6)                                       11.8   12.24 )-----------( 286      ( 30 Apr 2023 05:31 )             35.7   04-30    140  |  112<H>  |  12  ----------------------------<  99  3.6   |  27  |  0.67    Ca    8.2<L>      30 Apr 2023 05:31    TPro  6.2  /  Alb  3.0<L>  /  TBili  0.6  /  DBili  x   /  AST  15  /  ALT  38  /  AlkPhos  56  04-30    CAPILLARY BLOOD GLUCOSE        LIVER FUNCTIONS - ( 29 Apr 2023 06:36 )  Alb: 3.2 g/dL / Pro: 6.4 gm/dL / ALK PHOS: 57 U/L / ALT: 34 U/L / AST: 13 U/L / GGT: x                     RADIOLOGY/EKG:    Sagittal and coronal reformats were performed.    FINDINGS:  LOWER CHEST: Small bilateral atelectasis.    LIVER: Small hypodense lesion in the left hepatic lobe, too small to characterize.  BILE DUCTS: Grossly stable dilated common bile duct is again seen, probably due to post cholecystectomy status.  GALLBLADDER: Stable cholecystectomy clips.  SPLEEN: Within normal limits.  PANCREAS: Within normal limits.  ADRENALS: Stable mild nodular thickening of the right adrenal gland. Stable nonspecific 1.1 cm left adrenal nodule.  KIDNEYS/URETERS: No evidence for a ureteral calculus. No hydronephrosis. A few indeterminate hypodense lesions in the kidneys bilaterally measuring up to 2.1 cm on the left. If clinically indicated, abdominal MR without and with IV contrast may be pursued for further evaluation.    BLADDER: Within normal limits.  REPRODUCTIVE ORGANS: The uterus is again not visualized, likely surgically absent. The adnexae appear grossly unremarkable.    BOWEL: No bowel obstruction. Appendix within normal limits. Apparent mild focal mural thickening at the rectosigmoid junction with adjacent diverticulosis and stranding, compatible with diverticulitis. No associated abscess is identified.  PERITONEUM: No free air or ascites.  VESSELS: Calcified atherosclerotic disease.  RETROPERITONEUM/LYMPH NODES: No lymphadenopathy.  ABDOMINAL WALL: Within normal limits.  BONES: Orthopedic hardware at the lower lumbar spine. Degenerative spondylosis. Grade 1 anterolisthesis at L3-4 and L5-S1.    IMPRESSION:  Apparent mild focal mural thickening at the rectosigmoid junction with adjacent diverticulosis and stranding, compatible with diverticulitis. No associated abscess is identified. Follow-up colonoscopy after treatment/resolution of acute disease may be pursued to rule out colon cancer.    Grossly stable dilated common bile duct is again seen, probably due to post cholecystectomy status. If there is a clinical suspicion for biliary obstruction, MRCP may be pursued for further evaluation.    A few indeterminate hypodense lesions in the kidneys bilaterally measuring up to 2.1 cm on the left. If clinically indicated, nonemergent abdominal MR without and with IV contrast may be pursued for further evaluation.    --- End of Report ---

## 2023-04-30 NOTE — PROGRESS NOTE ADULT - ASSESSMENT
1. SMA syndrome s/p GJ tube placement    Recommendation  1. Continue with gradual advancement of TF to goal  2. Discussed regarding venting if having symptoms  3. If able to advance and tolerate goal TF, hopeful discharge in next 24-48 hrs.  1. Uncomplicated rectosigmoid diverticulitis    Recommendation  1. Advance diet to low fiber for next 4 weeks then high fiber thereafter  2. Continue with antibiotics with eventual transition to oral for total 10 day course  3. Follow up with Dr. Gabo Childs on discharge; if stable no contraindications to discharge today

## 2023-04-30 NOTE — PROGRESS NOTE ADULT - SUBJECTIVE AND OBJECTIVE BOX
Patient is a 52y old  Female who presents with a chief complaint of Abdominal Pain (2023 10:41)      Subective: Patient seen and examined at bedside. Overnight, TF advanced to 40 cc/hr but had difficulty tolerating with emesis and venting. Currently at 30 cc/hr without any complaints.       PAST MEDICAL & SURGICAL HISTORY:  Fibromyalgia      Anxiety      Arthritis      Breast mass      Cyclical vomiting      Renal stone  left      History of rib fracture  right      H/O umbilical hernia repair  2017      H/O: hysterectomy  2017      S/P   2003, 2004      History of lumbar fusion  2011      Carpal tunnel syndrome of left wrist  2006      Carpal tunnel syndrome of right wrist  2007      History of cholecystectomy  1992      Breast cyst  right  (x2)   2014  (last one)          MEDICATIONS  (STANDING):  enoxaparin Injectable 40 milliGRAM(s) SubCutaneous every 12 hours  famotidine Injectable 20 milliGRAM(s) IV Push two times a day  naloxone Injectable 0.4 milliGRAM(s) IV Push once  pantoprazole    Tablet 40 milliGRAM(s) Oral before breakfast  piperacillin/tazobactam IVPB.. 3.375 Gram(s) IV Intermittent every 6 hours  sodium chloride 0.9%. 1000 milliLiter(s) (100 mL/Hr) IV Continuous <Continuous>    MEDICATIONS  (PRN):  HYDROmorphone  Injectable 2 milliGRAM(s) IV Push every 6 hours PRN Severe Pain (7 - 10)  ondansetron Injectable 4 milliGRAM(s) IV Push every 4 hours PRN Nausea and/or Vomiting  oxyCODONE    IR 15 milliGRAM(s) Oral four times a day PRN Moderate Pain (4 - 6)      REVIEW OF SYSTEMS:    RESPIRATORY: No shortness of breath  CARDIOVASCULAR: No chest pain  All other review of systems is negative unless indicated above.    Vital Signs Last 24 Hrs  T(C): 36.4 (2023 09:12), Max: 36.8 (2023 15:12)  T(F): 97.5 (2023 09:12), Max: 98.2 (2023 15:12)  HR: 49 (2023 09:12) (49 - 51)  BP: 123/54 (2023 09:12) (100/65 - 123/54)  BP(mean): --  RR: 18 (2023 09:12) (18 - 19)  SpO2: 96% (2023 09:12) (95% - 96%)    Parameters below as of 2023 09:12  Patient On (Oxygen Delivery Method): room air        PHYSICAL EXAM:    Constitutional: NAD  Respiratory: CTAB  Cardiovascular: S1 and S2, RRR  Gastrointestinal: BS+, soft, NT/ND, GJ tube with external bumper at 3 cm with no discharge or pain on palpation  Extremities: No peripheral edema  Psychiatric: Normal mood, normal affect    LABS:                        11.8   12.24 )-----------( 286      ( 2023 05:31 )             35.7     04-30    140  |  112<H>  |  12  ----------------------------<  99  3.6   |  27  |  0.67    Ca    8.2<L>      2023 05:31    TPro  6.2  /  Alb  3.0<L>  /  TBili  0.6  /  DBili  x   /  AST  15  /  ALT  38  /  AlkPhos  56  04-30      LIVER FUNCTIONS - ( 2023 05:31 )  Alb: 3.0 g/dL / Pro: 6.2 gm/dL / ALK PHOS: 56 U/L / ALT: 38 U/L / AST: 15 U/L / GGT: x             RADIOLOGY & ADDITIONAL STUDIES: Patient is a 52y old  Female who presents with a chief complaint of Abdominal Pain (2023 10:41)      Subective: Patient seen and examined at bedside. No events overnight. Abdominal pain has improved, no vomiting but has intermittent nausea. Tolearting liquids and eager to advance diet.       PAST MEDICAL & SURGICAL HISTORY:  Fibromyalgia      Anxiety      Arthritis      Breast mass      Cyclical vomiting      Renal stone  left      History of rib fracture  right      H/O umbilical hernia repair  2017      H/O: hysterectomy  2017      S/P   2003, 2004      History of lumbar fusion  2011      Carpal tunnel syndrome of left wrist  2006      Carpal tunnel syndrome of right wrist  2007      History of cholecystectomy  1992      Breast cyst  right  (x2)   2014  (last one)          MEDICATIONS  (STANDING):  enoxaparin Injectable 40 milliGRAM(s) SubCutaneous every 12 hours  famotidine Injectable 20 milliGRAM(s) IV Push two times a day  naloxone Injectable 0.4 milliGRAM(s) IV Push once  pantoprazole    Tablet 40 milliGRAM(s) Oral before breakfast  piperacillin/tazobactam IVPB.. 3.375 Gram(s) IV Intermittent every 6 hours  sodium chloride 0.9%. 1000 milliLiter(s) (100 mL/Hr) IV Continuous <Continuous>    MEDICATIONS  (PRN):  HYDROmorphone  Injectable 2 milliGRAM(s) IV Push every 6 hours PRN Severe Pain (7 - 10)  ondansetron Injectable 4 milliGRAM(s) IV Push every 4 hours PRN Nausea and/or Vomiting  oxyCODONE    IR 15 milliGRAM(s) Oral four times a day PRN Moderate Pain (4 - 6)      REVIEW OF SYSTEMS:    RESPIRATORY: No shortness of breath  CARDIOVASCULAR: No chest pain  All other review of systems is negative unless indicated above.    Vital Signs Last 24 Hrs  T(C): 36.4 (2023 09:12), Max: 36.8 (2023 15:12)  T(F): 97.5 (2023 09:12), Max: 98.2 (2023 15:12)  HR: 49 (2023 09:12) (49 - 51)  BP: 123/54 (2023 09:12) (100/65 - 123/54)  BP(mean): --  RR: 18 (2023 09:12) (18 - 19)  SpO2: 96% (2023 09:12) (95% - 96%)    Parameters below as of 2023 09:12  Patient On (Oxygen Delivery Method): room air        PHYSICAL EXAM:    Constitutional: NAD  Respiratory: CTAB  Cardiovascular: S1 and S2, RRR  Gastrointestinal: BS+, soft, minimal TTP of LLQ  Extremities: No peripheral edema  Psychiatric: Normal mood, normal affect    LABS:                        11.8   12.24 )-----------( 286      ( 2023 05:31 )             35.7     04-30    140  |  112<H>  |  12  ----------------------------<  99  3.6   |  27  |  0.67    Ca    8.2<L>      2023 05:31    TPro  6.2  /  Alb  3.0<L>  /  TBili  0.6  /  DBili  x   /  AST  15  /  ALT  38  /  AlkPhos  56  04-30      LIVER FUNCTIONS - ( 2023 05:31 )  Alb: 3.0 g/dL / Pro: 6.2 gm/dL / ALK PHOS: 56 U/L / ALT: 38 U/L / AST: 15 U/L / GGT: x             RADIOLOGY & ADDITIONAL STUDIES:

## 2023-04-30 NOTE — PROGRESS NOTE ADULT - SUBJECTIVE AND OBJECTIVE BOX
Pt has been seen and examined with FP resident, resident supervised agree with a/p       Patient is a 52y old  Female who presents with a chief complaint of Abdominal Pain (29 Apr 2023 12:44)          PHYSICAL EXAM:  Vital Signs Last 24 Hrs  T(C): 36.6 (29 Apr 2023 23:10), Max: 36.8 (29 Apr 2023 15:12)  T(F): 97.9 (29 Apr 2023 23:10), Max: 98.2 (29 Apr 2023 15:12)  HR: 51 (29 Apr 2023 23:10) (50 - 51)  BP: 100/65 (29 Apr 2023 23:10) (100/65 - 120/77)  BP(mean): --  RR: 18 (29 Apr 2023 23:10) (18 - 19)  SpO2: 96% (29 Apr 2023 23:10) (95% - 96%)    Parameters below as of 29 Apr 2023 23:10  Patient On (Oxygen Delivery Method): room air      -rs-aeeb, cta  -cvs-s1s2 normal   -p/a-soft,bs+      A/P    #MRI to follow     #ct abx,     #d/c plan

## 2023-04-30 NOTE — PROGRESS NOTE ADULT - ASSESSMENT
53 yo F w PMH of pancreatitis, morbid obesity, fibromyalgia, anxiety, OA, cyclical vomiting syndrome presenting for intractable abd pain for 2 days. Found to have diverticulitis.     #Severe Abdominal Pain due to Acute Uncomplicated Diverticulitis   -Pt presented w intractable abd pain, N/V and diarrhea   -CT scan demonstrates diverticulitis   -S/P Cipro and Metronidazole in ED   -Continue w Zosyn   -Lactate WNL  -GI consult recs apprecaited  -s/p LR at 100 cc/hour   -Monitor leukocytosis   - 4/29: Pain improving, tolerating clear diet, advance to full liquids today  - 4/30 improving pain, currently dilaudid 2mg Q6 for severe pain, oxycodone 15mg Q6 for moderate pain, encouraged PO pain meds for dc planning      #Kidney lesions  -Small bilateral renal hypodensities noted on CT   -MRI w/wo contrast.   -Discussed results with patient, unknown etiology looking to investigate further. All questions answered. Patient has titanium plate from spinal surgery but has had prior MRIs, safe for continuing with MRI. Safety checklist filled out placed in chart.   - 4/30 MRI pendinh     #Diet   -Full liquids, advance as tolerated    #DVT PPX    -Lovenox sub Q     #Advanced Directives   -FULL CODE  53 yo F w PMH of pancreatitis, morbid obesity, fibromyalgia, anxiety, OA, cyclical vomiting syndrome presenting for intractable abd pain for 2 days. Found to have diverticulitis.     #Severe Abdominal Pain due to Acute Uncomplicated Diverticulitis   -Pt presented w intractable abd pain, N/V and diarrhea   -CT scan demonstrates diverticulitis   -S/P Cipro and Metronidazole in ED   -Continue w Zosyn   -Lactate WNL  -GI consult recs apprecaited  -s/p LR at 100 cc/hour   -Monitor leukocytosis   - 4/29: Pain improving, tolerating clear diet, advance to full liquids today  - 4/30 improving pain, currently dilaudid 2mg Q6 for severe pain, oxycodone 15mg Q6 for moderate pain, encouraged PO pain meds for dc planning. At the moment, pt unwilling to dc IV pain meds due to pain.       #Kidney lesions  -Small bilateral renal hypodensities noted on CT   -MRI w/wo contrast.   -Discussed results with patient, unknown etiology looking to investigate further. All questions answered. Patient has titanium plate from spinal surgery but has had prior MRIs, safe for continuing with MRI. Safety checklist filled out placed in chart.   - 4/30 MRI pendinh     #Diet   -Full liquids, advance as tolerated    #DVT PPX    -Lovenox sub Q     #Advanced Directives   -FULL CODE

## 2023-05-01 ENCOUNTER — TRANSCRIPTION ENCOUNTER (OUTPATIENT)
Age: 53
End: 2023-05-01

## 2023-05-01 VITALS
SYSTOLIC BLOOD PRESSURE: 137 MMHG | HEART RATE: 54 BPM | RESPIRATION RATE: 18 BRPM | DIASTOLIC BLOOD PRESSURE: 79 MMHG | TEMPERATURE: 98 F | OXYGEN SATURATION: 97 %

## 2023-05-01 LAB
HCT VFR BLD CALC: 34.2 % — LOW (ref 34.5–45)
HGB BLD-MCNC: 11.5 G/DL — SIGNIFICANT CHANGE UP (ref 11.5–15.5)
MCHC RBC-ENTMCNC: 28.9 PG — SIGNIFICANT CHANGE UP (ref 27–34)
MCHC RBC-ENTMCNC: 33.6 GM/DL — SIGNIFICANT CHANGE UP (ref 32–36)
MCV RBC AUTO: 85.9 FL — SIGNIFICANT CHANGE UP (ref 80–100)
PLATELET # BLD AUTO: 259 K/UL — SIGNIFICANT CHANGE UP (ref 150–400)
RBC # BLD: 3.98 M/UL — SIGNIFICANT CHANGE UP (ref 3.8–5.2)
RBC # FLD: 14 % — SIGNIFICANT CHANGE UP (ref 10.3–14.5)
WBC # BLD: 12.15 K/UL — HIGH (ref 3.8–10.5)
WBC # FLD AUTO: 12.15 K/UL — HIGH (ref 3.8–10.5)

## 2023-05-01 PROCEDURE — 99239 HOSP IP/OBS DSCHRG MGMT >30: CPT | Mod: GC

## 2023-05-01 RX ORDER — METRONIDAZOLE 500 MG
1 TABLET ORAL
Qty: 18 | Refills: 0
Start: 2023-05-01 | End: 2023-05-06

## 2023-05-01 RX ORDER — DICLOFENAC SODIUM 75 MG/1
1 TABLET, DELAYED RELEASE ORAL
Refills: 0 | DISCHARGE

## 2023-05-01 RX ORDER — CIPROFLOXACIN LACTATE 400MG/40ML
1 VIAL (ML) INTRAVENOUS
Qty: 12 | Refills: 0
Start: 2023-05-01 | End: 2023-05-06

## 2023-05-01 RX ADMIN — HYDROMORPHONE HYDROCHLORIDE 2 MILLIGRAM(S): 2 INJECTION INTRAMUSCULAR; INTRAVENOUS; SUBCUTANEOUS at 08:52

## 2023-05-01 RX ADMIN — HYDROMORPHONE HYDROCHLORIDE 2 MILLIGRAM(S): 2 INJECTION INTRAMUSCULAR; INTRAVENOUS; SUBCUTANEOUS at 02:54

## 2023-05-01 RX ADMIN — HYDROMORPHONE HYDROCHLORIDE 2 MILLIGRAM(S): 2 INJECTION INTRAMUSCULAR; INTRAVENOUS; SUBCUTANEOUS at 09:50

## 2023-05-01 RX ADMIN — PANTOPRAZOLE SODIUM 40 MILLIGRAM(S): 20 TABLET, DELAYED RELEASE ORAL at 06:09

## 2023-05-01 RX ADMIN — PIPERACILLIN AND TAZOBACTAM 25 GRAM(S): 4; .5 INJECTION, POWDER, LYOPHILIZED, FOR SOLUTION INTRAVENOUS at 08:52

## 2023-05-01 RX ADMIN — OXYCODONE HYDROCHLORIDE 15 MILLIGRAM(S): 5 TABLET ORAL at 00:48

## 2023-05-01 RX ADMIN — ENOXAPARIN SODIUM 40 MILLIGRAM(S): 100 INJECTION SUBCUTANEOUS at 09:51

## 2023-05-01 RX ADMIN — FAMOTIDINE 20 MILLIGRAM(S): 10 INJECTION INTRAVENOUS at 09:51

## 2023-05-01 RX ADMIN — PIPERACILLIN AND TAZOBACTAM 25 GRAM(S): 4; .5 INJECTION, POWDER, LYOPHILIZED, FOR SOLUTION INTRAVENOUS at 02:50

## 2023-05-01 RX ADMIN — ONDANSETRON 4 MILLIGRAM(S): 8 TABLET, FILM COATED ORAL at 00:47

## 2023-05-01 NOTE — DISCHARGE NOTE NURSING/CASE MANAGEMENT/SOCIAL WORK - NSDCVIVACCINE_GEN_ALL_CORE_FT
Tdap; 03-May-2017 14:18; Edwina Emanuel (RN); [employer]; [id number]; IntraMuscular; Deltoid Right.; 0.5 milliLiter(s); VIS (VIS Published: 09-May-2013, VIS Presented: 03-May-2017);

## 2023-05-01 NOTE — DISCHARGE NOTE NURSING/CASE MANAGEMENT/SOCIAL WORK - PATIENT PORTAL LINK FT
You can access the FollowMyHealth Patient Portal offered by [facility] by registering at the following website: [email or web address] By joining [facility]’s FollowMyHealth portal, you will also be able to view your health information using other applications (apps) compatible with our system.

## 2023-05-01 NOTE — DISCHARGE NOTE NURSING/CASE MANAGEMENT/SOCIAL WORK - NSDCPEFALRISK_GEN_ALL_CORE
For information on Fall & Injury Prevention, visit: [email or web address] OR  [email or web address] OR  https://www.cdc.gov/steadi/patient.html

## 2023-05-01 NOTE — DISCHARGE NOTE PROVIDER - NSDCMRMEDTOKEN_GEN_ALL_CORE_FT
diclofenac potassium 25 mg oral tablet: 1 tab(s) orally once a day  oxyCODONE 15 mg oral tablet: 1 tab(s) orally 4 times a day as needed for   Cipro 500 mg oral tablet: 1 tab(s) orally 2 times a day  diclofenac potassium 25 mg oral tablet: 1 tab(s) orally once a day  metroNIDAZOLE 500 mg oral tablet: 1 tab(s) orally every 8 hours  oxyCODONE 15 mg oral tablet: 1 tab(s) orally 4 times a day as needed for

## 2023-05-01 NOTE — PROGRESS NOTE ADULT - SUBJECTIVE AND OBJECTIVE BOX
Patient is a 52y old  Female who presents with a chief complaint of Abdominal Pain (2023 11:17)      Subective:  Feels good. Abdominal pain mostly resolved.    PAST MEDICAL & SURGICAL HISTORY:  Fibromyalgia      Anxiety      Arthritis      Breast mass      Cyclical vomiting      Renal stone  left      History of rib fracture  right      H/O umbilical hernia repair  2017      H/O: hysterectomy  2017      S/P   2003, 2004      History of lumbar fusion  2011      Carpal tunnel syndrome of left wrist  2006      Carpal tunnel syndrome of right wrist  2007      History of cholecystectomy  1992      Breast cyst  right  (x2)   2014  (last one)          MEDICATIONS  (STANDING):  enoxaparin Injectable 40 milliGRAM(s) SubCutaneous every 12 hours  famotidine Injectable 20 milliGRAM(s) IV Push two times a day  naloxone Injectable 0.4 milliGRAM(s) IV Push once  pantoprazole    Tablet 40 milliGRAM(s) Oral before breakfast  piperacillin/tazobactam IVPB.. 3.375 Gram(s) IV Intermittent every 6 hours  sodium chloride 0.9%. 1000 milliLiter(s) (100 mL/Hr) IV Continuous <Continuous>    MEDICATIONS  (PRN):  HYDROmorphone  Injectable 2 milliGRAM(s) IV Push every 6 hours PRN Severe Pain (7 - 10)  ondansetron Injectable 4 milliGRAM(s) IV Push every 4 hours PRN Nausea and/or Vomiting  oxyCODONE    IR 15 milliGRAM(s) Oral four times a day PRN Moderate Pain (4 - 6)      REVIEW OF SYSTEMS:    RESPIRATORY: No shortness of breath  CARDIOVASCULAR: No chest pain  All other review of systems is negative unless indicated above.    Vital Signs Last 24 Hrs  T(C): 36.8 (2023 23:16), Max: 36.8 (2023 23:16)  T(F): 98.2 (2023 23:16), Max: 98.2 (2023 23:16)  HR: 60 (2023 23:16) (49 - 67)  BP: 106/55 (2023 23:16) (106/55 - 131/72)  BP(mean): --  RR: 18 (2023 23:16) (16 - 18)  SpO2: 97% (2023 23:16) (94% - 97%)    Parameters below as of 2023 23:16  Patient On (Oxygen Delivery Method): room air        PHYSICAL EXAM:    Constitutional: NAD, well-developed  Respiratory: CTAB  Cardiovascular: S1 and S2, RRR  Gastrointestinal: BS+, soft, minimal LLQ tend  Extremities: No peripheral edema  Psychiatric: Normal mood, normal affect    LABS:                        11.5   12.15 )-----------( 259      ( 01 May 2023 06:42 )             34.2     04-30    140  |  112<H>  |  12  ----------------------------<  99  3.6   |  27  |  0.67    Ca    8.2<L>      2023 05:31    TPro  6.2  /  Alb  3.0<L>  /  TBili  0.6  /  DBili  x   /  AST  15  /  ALT  38  /  AlkPhos  56  04-30      LIVER FUNCTIONS - ( 2023 05:31 )  Alb: 3.0 g/dL / Pro: 6.2 gm/dL / ALK PHOS: 56 U/L / ALT: 38 U/L / AST: 15 U/L / GGT: x             RADIOLOGY & ADDITIONAL STUDIES:

## 2023-05-01 NOTE — PROGRESS NOTE ADULT - SUBJECTIVE AND OBJECTIVE BOX
Pt has been seen and examined with FP resident, resident supervised agree with a/p       Patient is a 52y old  Female who presents with a chief complaint of Abdominal Pain (29 Apr 2023 12:44)        -rs-aeeb, cta  -cvs-s1s2 normal   -p/a-soft,bs+      A/P    #Advance diet as tolerated and if clinically better then d/c towards end of day today with further management as an outpt, time spent 45 minutes  Pt has been seen and examined with FP resident, resident supervised agree with a/p       Patient is a 52y old  Female who presents with a chief complaint of Abdominal Pain (29 Apr 2023 12:44)        -rs-aeeb, cta  -cvs-s1s2 normal   -p/a-soft,bs+      A/P    #Advance diet as tolerated and if clinically better then d/c towards end of day today with further management as an outpt, time spent 45 minutes     #Counselled pt not to drink alcohol while taking medicine, not strenuous exercise or brisk running. Also educate not to use machinery while on narcotics pain medicine. Pt understood me and verbalized me back

## 2023-05-01 NOTE — DISCHARGE NOTE PROVIDER - NSDCCPCAREPLAN_GEN_ALL_CORE_FT
PRINCIPAL DISCHARGE DIAGNOSIS  Diagnosis: Acute diverticulitis  Assessment and Plan of Treatment: You were admitted for uncomplicated diverticulitis. GI was consulted. You received IV zosyn and pain meds. Upon discharge, your labs, vitals are stable. Significant improvement in pain. No nausea/ vomiting/ diarrhea anymore. You will be discharged with oral metronidazole and ciprofloxacin to finish the course of 10 days, so please take 500mg cipro every 12 hours for 6 days and 500mg metronidazole every 8 hours for 6 days. Per GI, you need to have low fiber for next 4 weeks then high fiber thereafter. You will need to follow up with her GI doctor and primary care provider for further management and evaluation.   You can take your home meds oxycodone 15mg every 6 hours as needed for your pain.   If you develop fever/ severe abdominal pain/ nausea/ vomiting, please seek medical attention immediately        SECONDARY DISCHARGE DIAGNOSES  Diagnosis: Abnormal MRI  Assessment and Plan of Treatment: Your MRI shows renal lesions on the prior CT are cysts, two of which demonstrate hemorrhagic or proteinaceous content.  You were found to have small amount of fluid in the gallbladder fossa and adjacent to the proximal duodenum and trace perihepatic ascites, new since 4/27/2023 of uncertain etiology. You were also found to have tubular cystic focus at the anterior superior aspect of the pancreatic neck measuring up to 1.0 cm, possibly a dilated pancreatic ductal side branch or cystic pancreatic lesion.   You need to follow up with her primary care provider and gastroenterologist for the above findings for further evaluation and management.

## 2023-05-01 NOTE — DISCHARGE NOTE PROVIDER - HOSPITAL COURSE
51 yo F w PMH of pancreatitis, morbid obesity, fibromyalgia, anxiety, OA, cyclical vomiting syndrome presenting for intractable abd pain for 2 days. Pt states as of 2 days ago, she started experiencing severe diffuse abd pain associated w diarrhea (3 episodes yesterday, 4 today, non-bloody), vomiting every 20 minutes (non-bloody, + bilous), subjective fevers and chills at home. Denies recent travel, sick contacts, or changes in diet. Pt states she’s been unable to keep down any food or medications.       In ED: Given Cipro and Metronidazole, S/P 2L NS, Zofran, Hydromorphone 1mg x 1 along w Ketorolac 30 mg x 1. Labs notable for WBC 19 w neutrophilic predominance, Plts 414. CT Abd: Apparent mild focal mural thickening at the rectosigmoid junction with adjacent diverticulosis and stranding, compatible with diverticulitis. No associated abscess is identified.      Pt was admitted for uncomplicated diverticulitis. Gi was consulted. Pt continued to receive IV zosyn and pain meds. Upon discharge, pt's labs, vitals are stable. Significant improvement in pain. No nausea/ vomiting/ diarrhea anymore. Per GI, Pt will be discharged with oral metronidazole and ciprofloxacin to finish the course of 10 days and have low fiber for next 4 weeks then high fiber thereafter. Pt will need to follow up with her GI doctor and primary care provider for further management and evaluation.     Her MRI shows renal lesions on the prior CT are cysts, two of which demonstrate hemorrhagic or proteinaceous content.  She was found to have small amount of fluid in the gallbladder fossa and adjacent to the proximal duodenum and trace perihepatic ascites, new since 4/27/2023 of uncertain etiology. She was also found to have tubular cystic focus at the anterior superior aspect of the pancreatic neck measuring up to 1.0 cm, possibly a dilated pancreatic ductal side branch or cystic pancreatic lesion.   Pt needs to follow up with her primary care provider and gastroenterologist for the above findings for further evaluation and management.     Patient was seen on 05-01. She is medically optimized for discharge.    Subjective: Patient was seen and examined by me this morning at bedside.     REVIEW OF SYSTEMS:  CONSTITUTIONAL: No weakness, fevers or chills  EYES/ENT: No visual changes;  No vertigo or throat pain   NECK: No pain or stiffness  RESPIRATORY: No cough, wheezing, hemoptysis; No shortness of breath  CARDIOVASCULAR: No chest pain or palpitations  GASTROINTESTINAL: Mild to moderate left lower quadrant pain, improving. No nausea, vomiting; No diarrhea or constipation.   GENITOURINARY: No dysuria, frequency or hematuria  NEUROLOGICAL: No numbness or weakness  SKIN: No itching, burning, rashes, or lesions     PHYSICAL EXAM:  Vital Signs Last 24 Hrs  T(C): 36.4 (01 May 2023 07:49), Max: 36.8 (30 Apr 2023 23:16)  T(F): 97.5 (01 May 2023 07:49), Max: 98.2 (30 Apr 2023 23:16)  HR: 54 (01 May 2023 07:49) (50 - 67)  BP: 137/79 (01 May 2023 07:49) (106/55 - 137/79)  BP(mean): --  RR: 18 (01 May 2023 07:49) (16 - 18)  SpO2: 97% (01 May 2023 07:49) (94% - 97%)    Parameters below as of 01 May 2023 07:49  Patient On (Oxygen Delivery Method): room air        Constitutional: Pt lying in bed, awake and alert, NAD  HEENT: EOMI, normal hearing, moist mucous membranes  Neck: Soft and supple, no JVD  Respiratory: CTABL, No wheezing, rales or rhonchi  Cardiovascular: S1S2+, RRR, no M/G/R  Gastrointestinal: BS+, left lower quadrant abdominal pain/tenderness, improving   Extremities: No peripheral edema  Vascular: 2+ peripheral pulses  Neurological: AAOx3, no focal deficits  Musculoskeletal: 5/5 strength b/l upper and lower extremities  Skin: No rashes

## 2023-05-01 NOTE — DISCHARGE NOTE PROVIDER - CARE PROVIDER_API CALL
Gabo Childs (MD)  Gastroenterology; Internal Medicine  [address] [facility], [address] [place] [address]  Phone: [phone]  Fax: [phone]  Established Patient  Follow Up Time: 1 week    Durga Gamble  [facility]  [address] [place] [address], [place] [address]  Phone: [phone]  Fax: [phone]  Established Patient  Follow Up Time: 1-3 days

## 2023-05-01 NOTE — PROGRESS NOTE ADULT - PROVIDER SPECIALTY LIST ADULT
Family Medicine
Hospitalist
Hospitalist
Family Medicine
Gastroenterology
Gastroenterology
Family Medicine
Gastroenterology
Hospitalist

## 2023-05-01 NOTE — PROGRESS NOTE ADULT - ASSESSMENT
Imp:  Resolving diverticulitis  Pancreatic lesion on CT noted -- cyst vs dilated sidebranch -- d/w pt and she will follow up outpatient

## 2023-05-01 NOTE — DISCHARGE NOTE PROVIDER - PROVIDER TOKENS
PROVIDER:[TOKEN:[[id number]],FOLLOWUP:[1 week],ESTABLISHEDPATIENT:[T]],PROVIDER:[TOKEN:[[id number]],FOLLOWUP:[1-3 days],ESTABLISHEDPATIENT:[T]]

## 2023-05-04 DIAGNOSIS — Z87.39 PERSONAL HISTORY OF OTHER DISEASES OF THE MUSCULOSKELETAL SYSTEM AND CONNECTIVE TISSUE: ICD-10-CM

## 2023-05-04 DIAGNOSIS — E66.01 MORBID (SEVERE) OBESITY DUE TO EXCESS CALORIES: ICD-10-CM

## 2023-05-04 DIAGNOSIS — Z98.1 ARTHRODESIS STATUS: ICD-10-CM

## 2023-05-04 DIAGNOSIS — Z82.69 FAMILY HISTORY OF OTHER DISEASES OF THE MUSCULOSKELETAL SYSTEM AND CONNECTIVE TISSUE: ICD-10-CM

## 2023-05-04 DIAGNOSIS — K86.9 DISEASE OF PANCREAS, UNSPECIFIED: ICD-10-CM

## 2023-05-04 DIAGNOSIS — Z87.442 PERSONAL HISTORY OF URINARY CALCULI: ICD-10-CM

## 2023-05-04 DIAGNOSIS — N28.9 DISORDER OF KIDNEY AND URETER, UNSPECIFIED: ICD-10-CM

## 2023-05-04 DIAGNOSIS — Z90.710 ACQUIRED ABSENCE OF BOTH CERVIX AND UTERUS: ICD-10-CM

## 2023-05-04 DIAGNOSIS — Z90.49 ACQUIRED ABSENCE OF OTHER SPECIFIED PARTS OF DIGESTIVE TRACT: ICD-10-CM

## 2023-05-04 DIAGNOSIS — K57.32 DIVERTICULITIS OF LARGE INTESTINE WITHOUT PERFORATION OR ABSCESS WITHOUT BLEEDING: ICD-10-CM

## 2023-05-04 DIAGNOSIS — F17.200 NICOTINE DEPENDENCE, UNSPECIFIED, UNCOMPLICATED: ICD-10-CM

## 2023-05-13 ENCOUNTER — EMERGENCY (EMERGENCY)
Facility: HOSPITAL | Age: 53
LOS: 0 days | Discharge: HOME | End: 2023-05-13
Attending: EMERGENCY MEDICINE
Payer: COMMERCIAL

## 2023-05-13 VITALS — OXYGEN SATURATION: 98 % | HEART RATE: 93 BPM | RESPIRATION RATE: 18 BRPM | TEMPERATURE: 99 F

## 2023-05-13 VITALS — DIASTOLIC BLOOD PRESSURE: 73 MMHG | SYSTOLIC BLOOD PRESSURE: 151 MMHG | WEIGHT: 190.04 LBS | HEIGHT: 63 IN

## 2023-05-13 DIAGNOSIS — N60.09 SOLITARY CYST OF UNSPECIFIED BREAST: Chronic | ICD-10-CM

## 2023-05-13 DIAGNOSIS — Z90.49 ACQUIRED ABSENCE OF OTHER SPECIFIED PARTS OF DIGESTIVE TRACT: ICD-10-CM

## 2023-05-13 DIAGNOSIS — F41.9 ANXIETY DISORDER, UNSPECIFIED: ICD-10-CM

## 2023-05-13 DIAGNOSIS — M79.7 FIBROMYALGIA: ICD-10-CM

## 2023-05-13 DIAGNOSIS — M54.2 CERVICALGIA: ICD-10-CM

## 2023-05-13 DIAGNOSIS — Z90.49 ACQUIRED ABSENCE OF OTHER SPECIFIED PARTS OF DIGESTIVE TRACT: Chronic | ICD-10-CM

## 2023-05-13 DIAGNOSIS — G56.01 CARPAL TUNNEL SYNDROME, RIGHT UPPER LIMB: Chronic | ICD-10-CM

## 2023-05-13 DIAGNOSIS — R51.9 HEADACHE, UNSPECIFIED: ICD-10-CM

## 2023-05-13 DIAGNOSIS — Z90.710 ACQUIRED ABSENCE OF BOTH CERVIX AND UTERUS: ICD-10-CM

## 2023-05-13 DIAGNOSIS — Z98.890 OTHER SPECIFIED POSTPROCEDURAL STATES: Chronic | ICD-10-CM

## 2023-05-13 DIAGNOSIS — Z98.891 HISTORY OF UTERINE SCAR FROM PREVIOUS SURGERY: Chronic | ICD-10-CM

## 2023-05-13 DIAGNOSIS — Z90.710 ACQUIRED ABSENCE OF BOTH CERVIX AND UTERUS: Chronic | ICD-10-CM
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
## 2023-05-13 DIAGNOSIS — G56.02 CARPAL TUNNEL SYNDROME, LEFT UPPER LIMB: Chronic | ICD-10-CM

## 2023-05-13 DIAGNOSIS — Z98.891 HISTORY OF UTERINE SCAR FROM PREVIOUS SURGERY: ICD-10-CM

## 2023-05-13 DIAGNOSIS — M19.90 UNSPECIFIED OSTEOARTHRITIS, UNSPECIFIED SITE: ICD-10-CM

## 2023-05-13 DIAGNOSIS — Z98.1 ARTHRODESIS STATUS: Chronic | ICD-10-CM

## 2023-05-13 PROCEDURE — G1004: CPT

## 2023-05-13 PROCEDURE — 99284 EMERGENCY DEPT VISIT MOD MDM: CPT | Mod: 25

## 2023-05-13 PROCEDURE — 99284 EMERGENCY DEPT VISIT MOD MDM: CPT

## 2023-05-13 PROCEDURE — 70450 CT HEAD/BRAIN W/O DYE: CPT | Mod: MG

## 2023-05-13 PROCEDURE — 72125 CT NECK SPINE W/O DYE: CPT | Mod: MG

## 2023-05-13 PROCEDURE — 72125 CT NECK SPINE W/O DYE: CPT | Mod: 26,MG

## 2023-05-13 PROCEDURE — 70450 CT HEAD/BRAIN W/O DYE: CPT | Mod: 26,MG

## 2023-05-13 RX ORDER — ACETAMINOPHEN 500 MG
500 TABLET ORAL ONCE
Refills: 0 | Status: COMPLETED | OUTPATIENT
Start: 2023-05-13 | End: 2023-05-13

## 2023-05-13 RX ORDER — IBUPROFEN 200 MG
400 TABLET ORAL ONCE
Refills: 0 | Status: COMPLETED | OUTPATIENT
Start: 2023-05-13 | End: 2023-05-13

## 2023-05-13 RX ORDER — CYCLOBENZAPRINE HYDROCHLORIDE 10 MG/1
1 TABLET, FILM COATED ORAL
Qty: 20 | Refills: 0
Start: 2023-05-13

## 2023-05-13 RX ADMIN — Medication 400 MILLIGRAM(S): at 20:01

## 2023-05-13 RX ADMIN — Medication 500 MILLIGRAM(S): at 20:00

## 2023-05-13 NOTE — ED STATDOCS - PHYSICAL EXAMINATION
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
## 2023-06-28 NOTE — ASU PATIENT PROFILE, ADULT - AS SC BRADEN ACTIVITY
Electrodesiccation And Curettage Text: The wound bed was treated with electrodesiccation and curettage after the biopsy was performed. (1) bedfast

## 2023-08-11 NOTE — DISCHARGE NOTE NURSING/CASE MANAGEMENT/SOCIAL WORK - NSDCPETBCESMAN_GEN_ALL_CORE
If you are a smoker, it is important for your health to stop smoking. Please be aware that second hand smoke is also harmful. Anesthesia Volume In Cc: 3

## 2023-11-03 ENCOUNTER — EMERGENCY (EMERGENCY)
Facility: HOSPITAL | Age: 53
LOS: 0 days | Discharge: HOME | End: 2023-11-04
Attending: EMERGENCY MEDICINE
Payer: COMMERCIAL

## 2023-11-03 VITALS — HEIGHT: 63 IN | WEIGHT: 244.93 LBS

## 2023-11-03 DIAGNOSIS — Z87.19 PERSONAL HISTORY OF OTHER DISEASES OF THE DIGESTIVE SYSTEM: ICD-10-CM

## 2023-11-03 DIAGNOSIS — Z98.890 OTHER SPECIFIED POSTPROCEDURAL STATES: Chronic | ICD-10-CM

## 2023-11-03 DIAGNOSIS — G56.01 CARPAL TUNNEL SYNDROME, RIGHT UPPER LIMB: Chronic | ICD-10-CM
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
## 2023-11-03 DIAGNOSIS — Z98.891 HISTORY OF UTERINE SCAR FROM PREVIOUS SURGERY: Chronic | ICD-10-CM

## 2023-11-03 DIAGNOSIS — Z90.710 ACQUIRED ABSENCE OF BOTH CERVIX AND UTERUS: ICD-10-CM

## 2023-11-03 DIAGNOSIS — Z90.49 ACQUIRED ABSENCE OF OTHER SPECIFIED PARTS OF DIGESTIVE TRACT: ICD-10-CM

## 2023-11-03 DIAGNOSIS — Z87.442 PERSONAL HISTORY OF URINARY CALCULI: ICD-10-CM

## 2023-11-03 DIAGNOSIS — M79.7 FIBROMYALGIA: ICD-10-CM

## 2023-11-03 DIAGNOSIS — M19.90 UNSPECIFIED OSTEOARTHRITIS, UNSPECIFIED SITE: ICD-10-CM

## 2023-11-03 DIAGNOSIS — Z90.49 ACQUIRED ABSENCE OF OTHER SPECIFIED PARTS OF DIGESTIVE TRACT: Chronic | ICD-10-CM

## 2023-11-03 DIAGNOSIS — N60.09 SOLITARY CYST OF UNSPECIFIED BREAST: Chronic | ICD-10-CM
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
## 2023-11-03 DIAGNOSIS — F41.9 ANXIETY DISORDER, UNSPECIFIED: ICD-10-CM

## 2023-11-03 DIAGNOSIS — G56.02 CARPAL TUNNEL SYNDROME, LEFT UPPER LIMB: Chronic | ICD-10-CM
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
## 2023-11-03 DIAGNOSIS — Z90.710 ACQUIRED ABSENCE OF BOTH CERVIX AND UTERUS: Chronic | ICD-10-CM

## 2023-11-03 DIAGNOSIS — S20.212A CONTUSION OF LEFT FRONT WALL OF THORAX, INITIAL ENCOUNTER: ICD-10-CM

## 2023-11-03 DIAGNOSIS — R07.81 PLEURODYNIA: ICD-10-CM

## 2023-11-03 DIAGNOSIS — Z98.1 ARTHRODESIS STATUS: Chronic | ICD-10-CM

## 2023-11-03 PROCEDURE — 99285 EMERGENCY DEPT VISIT HI MDM: CPT

## 2023-11-03 PROCEDURE — 71100 X-RAY EXAM RIBS UNI 2 VIEWS: CPT | Mod: LT

## 2023-11-03 PROCEDURE — 99284 EMERGENCY DEPT VISIT MOD MDM: CPT | Mod: 25

## 2023-11-03 PROCEDURE — 96372 THER/PROPH/DIAG INJ SC/IM: CPT

## 2023-11-03 PROCEDURE — 71250 CT THORAX DX C-: CPT | Mod: MA

## 2023-11-03 NOTE — ED ADULT TRIAGE NOTE - CHIEF COMPLAINT QUOTE
trip and fell on to left side. complains of left rib pain. denies head injury. reports chronic left knee problem causing her to trip.

## 2023-11-03 NOTE — ED ADULT NURSE NOTE - NSFALLRISKINTERV_ED_ALL_ED

## 2023-11-03 NOTE — ED ADULT NURSE NOTE - OBJECTIVE STATEMENT
Pt is 52 yo A&Ox4 ambulatory to ED c/o left rib pain. Pt states she has had left knee problems that "jeovanny randomly all the time." Pt states she was walking outside when left knee buckled and she tripped and landed on her left side. Pt states her left elbow struck her left side. Endorsing pain on exertion, breathing in, coughing, laughing and readjusting position. Denies headstrike and LOC.

## 2023-11-04 VITALS
SYSTOLIC BLOOD PRESSURE: 139 MMHG | OXYGEN SATURATION: 100 % | TEMPERATURE: 98 F | HEART RATE: 87 BPM | DIASTOLIC BLOOD PRESSURE: 80 MMHG | RESPIRATION RATE: 18 BRPM

## 2023-11-04 PROCEDURE — 71250 CT THORAX DX C-: CPT | Mod: 26,MA

## 2023-11-04 PROCEDURE — 71100 X-RAY EXAM RIBS UNI 2 VIEWS: CPT | Mod: 26,LT

## 2023-11-04 RX ORDER — HYDROMORPHONE HYDROCHLORIDE 2 MG/ML
1 INJECTION INTRAMUSCULAR; INTRAVENOUS; SUBCUTANEOUS ONCE
Refills: 0 | Status: DISCONTINUED | OUTPATIENT
Start: 2023-11-04 | End: 2023-11-04

## 2023-11-04 RX ADMIN — HYDROMORPHONE HYDROCHLORIDE 1 MILLIGRAM(S): 2 INJECTION INTRAMUSCULAR; INTRAVENOUS; SUBCUTANEOUS at 00:53

## 2023-11-04 RX ADMIN — HYDROMORPHONE HYDROCHLORIDE 1 MILLIGRAM(S): 2 INJECTION INTRAMUSCULAR; INTRAVENOUS; SUBCUTANEOUS at 01:48

## 2023-11-04 NOTE — ED PROVIDER NOTE - NSFOLLOWUPINSTRUCTIONS_ED_ALL_ED_FT
Return to the Emergency Department for worsening or persistent symptoms, and/or ANY NEW OR CONCERNING SYMPTOMS. If you have issues obtaining follow up, please call: [phone] or [phone]  to obtain a doctor or specialist who takes your insurance in your area.    Rib Contusion  A rib contusion is a deep bruise on the rib area. Contusions are the result of a blunt trauma that causes bleeding and injury to the tissues under the skin. A rib contusion may involve bruising of the ribs and of the skin and muscles in the area. The skin over the contusion may turn blue, purple, or yellow. Minor injuries result in a painless contusion. More severe contusions may be painful and swollen for a few weeks.    What are the causes?  This condition is usually caused by a hard, direct hit to an area of the body. This often occurs while playing contact sports.    What are the signs or symptoms?  Symptoms of this condition include:  Swelling and redness of the injured area.  Discoloration of the injured area.  Tenderness and soreness of the injured area.  Pain with or without movement.  Pain when breathing in.  How is this diagnosed?  This condition may be diagnosed based on:  Your symptoms and medical history.  A physical exam.  Imaging tests—such as an X-ray, CT scan, or MRI—to determine if there were internal injuries or broken bones (fractures).  How is this treated?  This condition may be treated with:  Rest. This is often the best treatment for a rib contusion.  Ice packs. This reduces swelling and inflammation.  Deep-breathing exercises. These may be recommended to reduce the risk for lung collapse and pneumonia.  Medicines. Over-the-counter or prescription medicines may be given to control pain.  Injection of a numbing medicine around the nerve near your injury (nerve block).  Follow these instructions at home:  Medicines    Take over-the-counter and prescription medicines only as told by your health care provider.  Ask your health care provider if the medicine prescribed to you:  Requires you to avoid driving or using machinery.  Can cause constipation. You may need to take these actions to prevent or treat constipation:  Drink enough fluid to keep your urine pale yellow.  Take over-the-counter or prescription medicines.  Eat foods that are high in fiber, such as beans, whole grains, and fresh fruits and vegetables.  Limit foods that are high in fat and processed sugars, such as fried or sweet foods.  Managing pain, stiffness, and swelling      If directed, put ice on the injured area. To do this:  Put ice in a plastic bag.  Place a towel between your skin and the bag.  Leave the ice on for 20 minutes, 2–3 times a day.  Remove the ice if your skin turns bright red. This is very important. If you cannot feel pain, heat, or cold, you have a greater risk of damage to the area.  Activity    Rest the injured area.  Avoid strenuous activity and any activities or movements that cause pain. Be careful during activities, and avoid bumping the injured area.  Do not lift anything that is heavier than 5 lb (2.3 kg), or the limit that you are told, until your health care provider says that it is safe.  General instructions      Do not use any products that contain nicotine or tobacco, such as cigarettes, e-cigarettes, and chewing tobacco. These can delay healing. If you need help quitting, ask your health care provider.  Do deep-breathing exercises as told by your health care provider.  If you were given an incentive spirometer, use it every 1–2 hours while you are awake, or as recommended by your health care provider. This device measures how well you are filling your lungs with each breath.  Keep all follow-up visits. This is important.  Contact a health care provider if you have:  Increased bruising or swelling.  Pain that is not controlled with treatment.  A fever.  Get help right away if you:  Have difficulty breathing or shortness of breath.  Develop a continual cough, or you cough up thick or bloody mucus from your lungs (sputum).  Feel nauseous or you vomit.  Have pain in your abdomen.  These symptoms may represent a serious problem that is an emergency. Do not wait to see if the symptoms will go away. Get medical help right away. Call your local emergency services (911 in the [place] Do not drive yourself to the hospital.    Summary  A rib contusion is a deep bruise on your rib area. Contusions are the result of a blunt trauma that causes bleeding and injury to the tissues under the skin.  The skin over the contusion may turn blue, purple, or yellow. Minor injuries may cause a painless contusion. More severe contusions may be painful and swollen for a few weeks.  Rest the injured area. Avoid strenuous activity and any activities or movements that cause pain.  This information is not intended to replace advice given to you by your health care provider. Make sure you discuss any questions you have with your health care provider.

## 2023-11-04 NOTE — ED PROVIDER NOTE - OBJECTIVE STATEMENT
54 yo F w PMH of pancreatitis, morbid obesity, fibromyalgia, anxiety, OA, cyclical vomiting syndrome Presents with left rib pain after fall.  She reports she has chronic left knee issues, her knee buckled and she fell onto a flexed elbow which pushed into her left lateral ribs.  She reports she has had ongoing pain despite taking oral pain medication at home, worse with deep breaths.  Feels similar to prior right-sided rib fractures.  No shortness of breath at rest, no anterior chest pain, no head injury, denies other injuries from this fall

## 2023-11-04 NOTE — ED PROVIDER NOTE - PATIENT PORTAL LINK FT
You can access the FollowMyHealth Patient Portal offered by [facility] by registering at the following website: [email or web address]. By joining [facility]’s FollowMyHealth portal, you will also be able to view your health information using other applications (apps) compatible with our system.

## 2023-11-04 NOTE — ED PROVIDER NOTE - PHYSICAL EXAMINATION
General: AAOx3, NAD  HEENT: NCAT  Cardiac: Normal rate and rhythym, no murmurs, normal peripheral perfusion  Respiratory: Normal rate and effort. CTAB  GI: Soft, nondistended, nontender  Neuro: No focal deficits. KINSEY equally x4, sensation to light touch intact throughout  MSK: FROMx4. +L anterior, lateral, posterior rib tenderness at nipple line. No stepoffs/deformities  Skin: No rash

## 2023-11-04 NOTE — ED PROVIDER NOTE - DISPOSITION TYPE
Name: Trinh Gonzales  Age: 25 year old  YOB: 1997   Medical Record #: [id number]     Fetal Non-Stress Test Results    NST Ordered By: Tai Hoang MD     Gestational Age: 36w2d       NST Start & Stop Times  Start: 930  Stop: 955     NST Results  Fetus A   Baseline Rate: 140  Variability: Present  Accelerations: Present  Decelerations: None  Interpretation: reactive and reassuring   Category:1            Tai Hoang MD  Obstetrics and Gynecology     DISCHARGE

## 2023-11-04 NOTE — ED PROVIDER NOTE - CLINICAL SUMMARY MEDICAL DECISION MAKING FREE TEXT BOX
Pt p/w fall and L rib injury. Given diffuse tenderness on exam will plan for CT chest since XR nonrevealing to r/o multiple subtle rib fracture requiring inpatient treatment. If neg or 3 or less fx will reassess pain status. VSS, breathing comfortably, O2 nl Pt p/w fall and L rib injury. Given diffuse tenderness on exam will plan for CT chest since XR nonrevealing to r/o multiple subtle rib fracture requiring inpatient treatment. If neg or 3 or less fx will reassess pain status. VSS, breathing comfortably, O2 nl  -CT neg, no PTX or rib fx. Pt has pain management at home, already has oxycodone and topical lidocaine patches at home. Stable for DC, return precautions discussed

## 2024-01-01 NOTE — ED STATDOCS - CARDIAC, MLM
"   Male-Irasema Philip MRN# [id number]   Age: 1 day old YOB: 2022     Vital Signs: are stable. Patient Vitals for the past 24 hrs:   Temp Temp src Pulse Resp Height Weight   07/03/24 0513 98.6  F (37  C) Axillary 124 44 -- --   07/03/24 0103 99.8  F (37.7  C) Axillary 112 60 -- --   07/03/24 0016 99  F (37.2  C) Axillary 152 48 -- --   07/02/24 2345 99.5  F (37.5  C) Axillary 120 52 -- --   07/02/24 2315 99.6  F (37.6  C) Axillary 148 60 -- --   07/02/24 2245 99.2  F (37.3  C) Axillary 160 60 -- --   07/02/24 2223 101.6  F (38.7  C) Axillary -- -- -- --   07/02/24 2218 102.1  F (38.9  C) Axillary -- -- -- --   07/02/24 2215 101.3  F (38.5  C) Axillary 130 50 -- --   07/02/24 2213 -- -- -- -- 0.514 m (1' 8.25\") 3.53 kg (7 lb 12.5 oz)     Void+Stool: yes  Pain:No  Feeding Type: on an ad don on demand schedule    " normal rate, regular rhythm, and no murmur.

## 2024-01-17 ENCOUNTER — OUTPATIENT (OUTPATIENT)
Dept: OUTPATIENT SERVICES | Facility: HOSPITAL | Age: 54
LOS: 1 days | End: 2024-01-17
Payer: COMMERCIAL

## 2024-01-17 ENCOUNTER — APPOINTMENT (OUTPATIENT)
Dept: MRI IMAGING | Facility: CLINIC | Age: 54
End: 2024-01-17
Payer: COMMERCIAL

## 2024-01-17 DIAGNOSIS — Z98.890 OTHER SPECIFIED POSTPROCEDURAL STATES: Chronic | ICD-10-CM

## 2024-01-17 DIAGNOSIS — Z90.710 ACQUIRED ABSENCE OF BOTH CERVIX AND UTERUS: Chronic | ICD-10-CM

## 2024-01-17 DIAGNOSIS — Z98.1 ARTHRODESIS STATUS: Chronic | ICD-10-CM

## 2024-01-17 DIAGNOSIS — G56.02 CARPAL TUNNEL SYNDROME, LEFT UPPER LIMB: Chronic | ICD-10-CM

## 2024-01-17 DIAGNOSIS — G56.01 CARPAL TUNNEL SYNDROME, RIGHT UPPER LIMB: Chronic | ICD-10-CM

## 2024-01-17 DIAGNOSIS — K86.2 CYST OF PANCREAS: ICD-10-CM

## 2024-01-17 DIAGNOSIS — Z90.49 ACQUIRED ABSENCE OF OTHER SPECIFIED PARTS OF DIGESTIVE TRACT: Chronic | ICD-10-CM

## 2024-01-17 DIAGNOSIS — N60.09 SOLITARY CYST OF UNSPECIFIED BREAST: Chronic | ICD-10-CM

## 2024-01-17 DIAGNOSIS — Z98.891 HISTORY OF UTERINE SCAR FROM PREVIOUS SURGERY: Chronic | ICD-10-CM

## 2024-01-17 PROCEDURE — 74183 MRI ABD W/O CNTR FLWD CNTR: CPT

## 2024-01-17 PROCEDURE — A9585: CPT

## 2024-01-17 PROCEDURE — 74183 MRI ABD W/O CNTR FLWD CNTR: CPT | Mod: 26

## 2024-02-20 NOTE — ED ADULT TRIAGE NOTE - MODE OF ARRIVAL
From: Pola Simental  To: Magdalena Henderson  Sent: 2/20/2024 12:46 PM CST  Subject: Wegovy    Hi doctor,  The wegovy we got prescribed in January has still not arrived to the pharmacy yet. I’m starting to become a little impatient. I know you mentioned that other facility in [place] that could be an option. How long would it take a shipment from them to arrive?   [facility]

## 2024-02-27 NOTE — ED STATDOCS - OBJECTIVE STATEMENT
Pt is a 51 y/o F presenting with a right wrist injury, stated she slipped and fell onto it, denied hitting her head, no loc. Unable to move her wirst. ABDOMINAL PAIN/NAUSEA/VOMITING

## 2024-02-28 ENCOUNTER — EMERGENCY (EMERGENCY)
Facility: HOSPITAL | Age: 54
LOS: 0 days | Discharge: HOME | End: 2024-02-28
Attending: EMERGENCY MEDICINE
Payer: COMMERCIAL

## 2024-02-28 VITALS — HEIGHT: 68 IN | WEIGHT: 229.94 LBS

## 2024-02-28 VITALS
OXYGEN SATURATION: 98 % | SYSTOLIC BLOOD PRESSURE: 119 MMHG | HEART RATE: 84 BPM | DIASTOLIC BLOOD PRESSURE: 73 MMHG | TEMPERATURE: 98 F | RESPIRATION RATE: 18 BRPM

## 2024-02-28 DIAGNOSIS — Z90.49 ACQUIRED ABSENCE OF OTHER SPECIFIED PARTS OF DIGESTIVE TRACT: Chronic | ICD-10-CM

## 2024-02-28 DIAGNOSIS — Z98.890 OTHER SPECIFIED POSTPROCEDURAL STATES: Chronic | ICD-10-CM

## 2024-02-28 DIAGNOSIS — M25.531 PAIN IN RIGHT WRIST: ICD-10-CM

## 2024-02-28 DIAGNOSIS — Z98.1 ARTHRODESIS STATUS: ICD-10-CM

## 2024-02-28 DIAGNOSIS — G56.02 CARPAL TUNNEL SYNDROME, LEFT UPPER LIMB: Chronic | ICD-10-CM

## 2024-02-28 DIAGNOSIS — G89.11 ACUTE PAIN DUE TO TRAUMA: ICD-10-CM
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
## 2024-02-28 DIAGNOSIS — M25.562 PAIN IN LEFT KNEE: ICD-10-CM

## 2024-02-28 DIAGNOSIS — M54.9 DORSALGIA, UNSPECIFIED: ICD-10-CM
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
## 2024-02-28 DIAGNOSIS — M06.9 RHEUMATOID ARTHRITIS, UNSPECIFIED: ICD-10-CM

## 2024-02-28 DIAGNOSIS — Z90.710 ACQUIRED ABSENCE OF BOTH CERVIX AND UTERUS: Chronic | ICD-10-CM

## 2024-02-28 DIAGNOSIS — Z98.1 ARTHRODESIS STATUS: Chronic | ICD-10-CM

## 2024-02-28 DIAGNOSIS — Z98.891 HISTORY OF UTERINE SCAR FROM PREVIOUS SURGERY: Chronic | ICD-10-CM

## 2024-02-28 DIAGNOSIS — N60.09 SOLITARY CYST OF UNSPECIFIED BREAST: Chronic | ICD-10-CM

## 2024-02-28 DIAGNOSIS — G56.01 CARPAL TUNNEL SYNDROME, RIGHT UPPER LIMB: Chronic | ICD-10-CM

## 2024-02-28 PROCEDURE — 73110 X-RAY EXAM OF WRIST: CPT | Mod: 26,RT

## 2024-02-28 PROCEDURE — 73130 X-RAY EXAM OF HAND: CPT | Mod: 26,RT

## 2024-02-28 PROCEDURE — 73110 X-RAY EXAM OF WRIST: CPT | Mod: RT

## 2024-02-28 PROCEDURE — 74176 CT ABD & PELVIS W/O CONTRAST: CPT | Mod: MC

## 2024-02-28 PROCEDURE — 99285 EMERGENCY DEPT VISIT HI MDM: CPT

## 2024-02-28 PROCEDURE — 99284 EMERGENCY DEPT VISIT MOD MDM: CPT | Mod: 25

## 2024-02-28 PROCEDURE — 73130 X-RAY EXAM OF HAND: CPT | Mod: RT

## 2024-02-28 PROCEDURE — 73564 X-RAY EXAM KNEE 4 OR MORE: CPT | Mod: 26,50

## 2024-02-28 PROCEDURE — 74176 CT ABD & PELVIS W/O CONTRAST: CPT | Mod: 26,MC

## 2024-02-28 PROCEDURE — 73564 X-RAY EXAM KNEE 4 OR MORE: CPT | Mod: 50

## 2024-02-28 RX ORDER — OXYCODONE HYDROCHLORIDE 5 MG/1
5 TABLET ORAL ONCE
Refills: 0 | Status: DISCONTINUED | OUTPATIENT
Start: 2024-02-28 | End: 2024-02-28

## 2024-02-28 RX ADMIN — OXYCODONE HYDROCHLORIDE 5 MILLIGRAM(S): 5 TABLET ORAL at 20:20

## 2024-02-28 NOTE — ED STATDOCS - CLINICAL SUMMARY MEDICAL DECISION MAKING FREE TEXT BOX
CT, pain control, reassess CT, pain control, reassess    signed Alejandra Dyson PA-C Pt seen initially in intake by Dr Coffman.  53F with mechanical fall this afternoon with pain in her right hand and wrist, low back, and knees. Pt worried since she has hardware in her wrist (Dr Sarabia) and lumbar spine. xrays NAD. The xray images were personally reviewed individually by me. No significant findings on CT. Pt feeling well at DC, declines wrist splint. outpt f/u with her hand and spine doctor and ortho. return precautions given. Pt feeling well at NV, agrees with DC and plan of care. Pt aware of renal cysts. Getting ride home from daughter.

## 2024-02-28 NOTE — ED ADULT NURSE NOTE - CAS EDP DISCH TYPE
Home Tazorac Counseling:  Patient advised that medication is irritating and drying.  Patient may need to apply sparingly and wash off after an hour before eventually leaving it on overnight.  The patient verbalized understanding of the proper use and possible adverse effects of tazorac.  All of the patient's questions and concerns were addressed.

## 2024-02-28 NOTE — ED STATDOCS - PATIENT PORTAL LINK FT
You can access the FollowMyHealth Patient Portal offered by [facility] by registering at the following website: [email or web address]. By joining [employer]’s FollowMyHealth portal, you will also be able to view your health information using other applications (apps) compatible with our system.

## 2024-02-28 NOTE — ED ADULT NURSE NOTE - OBJECTIVE STATEMENT
53y female, A&Ox3, ambulatory to ED s/p mechanical fall. PMH RA, hysterectomy, b/l wrist surgery, and left knee surgery (2/2023). Pt reports she was needing to get left knee replacement surgery and has not scheduled it yet. Pt reports her knee is "giving out" at times and she ends up falling. Today she fell in the afternoon and is c/o lower back pain, left knee pain, and right wrist pain. Denies head strike, denies LOC, -blood thinners. Pt denies HA, nausea, blurry vision, nausea/vomiting. Respirations are even and unlabored, in NAD.

## 2024-02-28 NOTE — ED STATDOCS - PROVIDER TOKENS
<<-----Click on this checkbox to enter Post-Op Dx
PROVIDER:[TOKEN:[[id number]]],PROVIDER:[TOKEN:[[id number]]],PROVIDER:[TOKEN:[[id number]]]

## 2024-02-28 NOTE — ED ADULT NURSE NOTE - DISCHARGE DATE/TIME
28-Feb-2024 21:17 Rhombic Flap Text: The defect edges were debeveled with a #15 scalpel blade. Given the location of the defect and the proximity to free margins a rhombic flap was deemed most appropriate. Using a sterile surgical marker, an appropriate rhombic flap was drawn incorporating the defect. The area thus outlined was incised deep to adipose tissue with a #15 scalpel blade. The skin margins were undermined to an appropriate distance in all directions utilizing iris scissors. Following this, the designed flap was carried over into the primary defect and sutured into place.

## 2024-02-28 NOTE — ED STATDOCS - OBJECTIVE STATEMENT
52 y/o female with PMHx of RA on Enbrel, spinal fusion, chronic bilateral knee issues, awaiting approval for knee replacement presents to the ED after her left knee gave out causing her to fall. Pt c/o back pain radiating down her leg, right wrist pain (previously repaired by Dr. Sarabia). Pt denies chest pain    Ortho: Jamil Jacobs  Wrist: No

## 2024-02-28 NOTE — ED ADULT TRIAGE NOTE - GLASGOW COMA SCALE: EYE OPENING, MLM
Patient Name:  Jordana Hdz  YOB: 1939  MRN:  [id number]  Admit Date:  8/16/2020  Patient Care Team:  Olive Claire MD as PCP - General      Chief Complaint   Patient presents with   • Shortness of Breath       Subjective     Ms. Hdz is a 80 y.o. female with a history of a fib on eliquis, hypothyroidism, HTN, HLD, GERD, dCHF that presents to [facility] complaining of shortness of breath. Patient reports progressively increasing shortness of breath and fatigue in past month, initially worse with exertion and laying flat but states it is now present most all the time, no relieving factors. She also reports diarrhea for past few weeks but denies dark or tarry stools. Patient denies chest pain, abdominal pain, nausea, vomiting, dizziness, urinary symptoms or edema. Labs done tonight show hemoglobin of 10.3, hypomagnesemia, and hemoccult positive stool was also noted. Patient denies history of GI bleeding in the past. CXR shows moderate to large left pleural effusions which is increased from prior imaging and left lower lobe atelectasis/infiltrate. Patient was given dose of IV lasix, mag sulfate, and protonix while in ED prior to transfer to the floor.     History of Present Illness    Past Medical History:   Diagnosis Date   • Acute congestive heart failure (CMS/HCC)    • Atrial fibrillation (CMS/HCC)    • Atrial flutter (CMS/HCC)    • Disease of thyroid gland    • GERD (gastroesophageal reflux disease)    • Hyperlipidemia    • Hypertension      Past Surgical History:   Procedure Laterality Date   • CATARACT EXTRACTION     • TUBAL ABDOMINAL LIGATION       Family History   Problem Relation Age of Onset   • Heart attack Father    • Stroke Father    • Hypertension Father    • Aneurysm Father         brain   • Heart disease Father    • Diabetes Paternal Aunt      Social History     Tobacco Use   • Smoking status: Former Smoker     Packs/day: 1.00     Years: 15.00     Pack  years: 15.00     Types: Cigarettes     Last attempt to quit:      Years since quittin.6   • Smokeless tobacco: Never Used   • Tobacco comment: caffeine 1-2 cups coffee daily   Substance Use Topics   • Alcohol use: Yes     Comment: rarely   • Drug use: No     Medications Prior to Admission   Medication Sig Dispense Refill Last Dose   • apixaban (ELIQUIS) 5 MG tablet tablet Take 5 mg by mouth 2 (Two) Times a Day.   2020 at Unknown time   • atorvastatin (LIPITOR) 40 MG tablet Take 40 mg by mouth Daily.   2020 at Unknown time   • carvedilol (COREG) 25 MG tablet Take 25 mg by mouth 2 (Two) Times a Day With Meals.   2020 at Unknown time   • digoxin (LANOXIN) 125 MCG tablet Take 1 tablet by mouth Daily. 30 tablet 11 2020 at Unknown time   • furosemide (LASIX) 20 MG tablet Take 20 mg by mouth Daily.   2020 at Unknown time   • levothyroxine (SYNTHROID, LEVOTHROID) 25 MCG tablet Take 25 mcg by mouth Daily.   2020 at Unknown time   • omeprazole (priLOSEC) 20 MG capsule Take 20 mg by mouth Daily.   8/15/2020 at Unknown time   • sertraline (ZOLOFT) 50 MG tablet Take  by mouth daily.   8/15/2020 at Unknown time   • cetirizine (zyrTEC) 10 MG tablet Take 10 mg by mouth Daily As Needed.   Unknown at Unknown time     Allergies:  No Known Allergies    Review of Systems   Constitutional: Positive for fatigue. Negative for chills and fever.   HENT: Positive for congestion. Negative for rhinorrhea and sore throat.    Eyes: Negative.  Negative for visual disturbance.   Respiratory: Positive for shortness of breath. Negative for cough.    Cardiovascular: Negative for chest pain and leg swelling.   Gastrointestinal: Negative.  Negative for abdominal pain, diarrhea, nausea and vomiting.   Endocrine: Negative.    Genitourinary: Negative.  Negative for dysuria, frequency and urgency.   Musculoskeletal: Negative.  Negative for arthralgias and myalgias.   Skin: Negative.  Negative for color change, pallor  and rash.   Allergic/Immunologic: Negative.    Neurological: Negative.  Negative for dizziness, weakness and light-headedness.   Hematological: Negative.    Psychiatric/Behavioral: Negative.  Negative for agitation, behavioral problems and confusion.        Objective    Vital Signs  Temp:  [97.7 °F (36.5 °C)-99.5 °F (37.5 °C)] 97.7 °F (36.5 °C)  Heart Rate:  [] 87  Resp:  [17-20] 17  BP: (143-200)/() 149/87  SpO2:  [91 %-99 %] 99 %  on  Flow (L/min):  [1] 1;   Device (Oxygen Therapy): nasal cannula  Body mass index is 29.94 kg/m².    Physical Exam   Constitutional: She is oriented to person, place, and time. She appears well-developed and well-nourished. No distress.   HENT:   Head: Normocephalic and atraumatic.   Eyes: EOM are normal.   Neck: Normal range of motion. Neck supple.   Cardiovascular: Normal rate and intact distal pulses. An irregularly irregular rhythm present.   No murmur heard.  Pulmonary/Chest: Effort normal. No respiratory distress. She has decreased breath sounds in the left middle field and the left lower field.   Abdominal: Soft. Bowel sounds are normal. She exhibits no distension.   Musculoskeletal: Normal range of motion. She exhibits edema (trace edema BLE).   Neurological: She is alert and oriented to person, place, and time.   Skin: Skin is warm and dry. She is not diaphoretic. No erythema.   Psychiatric: She has a normal mood and affect. Her behavior is normal. Judgment and thought content normal.   Nursing note and vitals reviewed.      Results Review:   I reviewed the patient's new clinical results including all labs and xrays.    Lab Results (last 24 hours)     Procedure Component Value Units Date/Time    Respiratory Panel PCR w/COVID-19(SARS-CoV-2) LIZETTE/MARY/MEMO/PAD In-House, NP Swab in UTM/VTM, 3-4 HR TAT - Swab, Nasopharynx [[id number]]  (Normal) Collected:  08/16/20 1656    Specimen:  Swab from Nasopharynx Updated:  08/16/20 1903     ADENOVIRUS, PCR Not Detected      Coronavirus 229E Not Detected     Coronavirus HKU1 Not Detected     Coronavirus NL63 Not Detected     Coronavirus OC43 Not Detected     COVID19 Not Detected     Human Metapneumovirus Not Detected     Human Rhinovirus/Enterovirus Not Detected     Influenza A PCR Not Detected     Influenza A H1 Not Detected     Influenza A H1 2009 PCR Not Detected     Influenza A H3 Not Detected     Influenza B PCR Not Detected     Parainfluenza Virus 1 Not Detected     Parainfluenza Virus 2 Not Detected     Parainfluenza Virus 3 Not Detected     Parainfluenza Virus 4 Not Detected     RSV, PCR Not Detected     Bordetella pertussis pcr Not Detected     Bordetella parapertussis PCR Not Detected     Chlamydophila pneumoniae PCR Not Detected     Mycoplasma pneumo by PCR Not Detected    Narrative:       Fact sheet for providers: https://docs.[employer]/wp-content/uploads/OOT3993-6099-RR1.1-EUA-Provider-Fact-Sheet-3.pdf    Fact sheet for patients: https://docs.[employer]/wp-content/uploads/AEB8535-6464-DL8.1-EUA-Patient-Fact-Sheet-1.pdf    CBC & Differential [[id number]] Collected:  08/16/20 1657    Specimen:  Blood Updated:  08/16/20 0233    Narrative:       The following orders were created for panel order CBC & Differential.  Procedure                               Abnormality         Status                     ---------                               -----------         ------                     CBC Auto Differential[[id number]]        Abnormal            Final result                 Please view results for these tests on the individual orders.    Comprehensive Metabolic Panel [[id number]]  (Abnormal) Collected:  08/16/20 1657    Specimen:  Blood Updated:  08/16/20 8455     Glucose 142 mg/dL      BUN 8 mg/dL      Creatinine 0.65 mg/dL      Sodium 139 mmol/L      Potassium 3.9 mmol/L      Comment: Specimen hemolyzed.  Results may be affected.        Chloride 102 mmol/L      CO2 27.4 mmol/L      Calcium 8.2 mg/dL      Total Protein 6.7  "g/dL      Albumin 3.40 g/dL      ALT (SGPT) 16 U/L      AST (SGOT) 19 U/L      Alkaline Phosphatase 101 U/L      Total Bilirubin 0.6 mg/dL      eGFR Non African Amer 88 mL/min/1.73      Globulin 3.3 gm/dL      A/G Ratio 1.0 g/dL      BUN/Creatinine Ratio 12.3     Anion Gap 9.6 mmol/L     Narrative:       GFR Normal >60  Chronic Kidney Disease <60  Kidney Failure <15      Procalcitonin [[id number]]  (Normal) Collected:  08/16/20 1657    Specimen:  Blood Updated:  08/16/20 1810     Procalcitonin 0.06 ng/mL     Narrative:       As a Marker for Sepsis (Non-Neonates):   1. <0.5 ng/mL represents a low risk of severe sepsis and/or septic shock.  1. >2 ng/mL represents a high risk of severe sepsis and/or septic shock.    As a Marker for Lower Respiratory Tract Infections that require antibiotic therapy:  PCT on Admission     Antibiotic Therapy             6-12 Hrs later  > 0.5                Strongly Recommended            >0.25 - <0.5         Recommended  0.1 - 0.25           Discouraged                   Remeasure/reassess PCT  <0.1                 Strongly Discouraged          Remeasure/reassess PCT      As 28 day mortality risk marker: \"Change in Procalcitonin Result\" (> 80 % or <=80 %) if Day 0 (or Day 1) and Day 4 values are available. Refer to http://www.[employer]-pct-calculator.com/   Change in PCT <=80 %   A decrease of PCT levels below or equal to 80 % defines a positive change in PCT test result representing a higher risk for 28-day all-cause mortality of patients diagnosed with severe sepsis or septic shock.  Change in PCT > 80 %   A decrease of PCT levels of more than 80 % defines a negative change in PCT result representing a lower risk for 28-day all-cause mortality of patients diagnosed with severe sepsis or septic shock.                Results may be falsely decreased if patient taking Biotin.     Troponin [[id number]]  (Normal) Collected:  08/16/20 1657    Specimen:  Blood Updated:  08/16/20 1805     Troponin T " <0.010 ng/mL     Narrative:       Troponin T Reference Range:  <= 0.03 ng/mL-   Negative for AMI  >0.03 ng/mL-     Abnormal for myocardial necrosis.  Clinicians would have to utilize clinical acumen, EKG, Troponin and serial changes to determine if it is an Acute Myocardial Infarction or myocardial injury due to an underlying chronic condition.       Results may be falsely decreased if patient taking Biotin.      BNP [[id number]]  (Normal) Collected:  08/16/20 1657    Specimen:  Blood Updated:  08/16/20 1803     proBNP 1,631.0 pg/mL     Narrative:       Among patients with dyspnea, NT-proBNP is highly sensitive for the detection of acute congestive heart failure. In addition NT-proBNP of <300 pg/ml effectively rules out acute congestive heart failure with 99% negative predictive value.    Results may be falsely decreased if patient taking Biotin.      Digoxin Level [[id number]]  (Normal) Collected:  08/16/20 1657    Specimen:  Blood Updated:  08/16/20 1806     Digoxin 1.10 ng/mL     Magnesium [[id number]]  (Abnormal) Collected:  08/16/20 1657    Specimen:  Blood Updated:  08/16/20 1805     Magnesium 1.3 mg/dL     CBC Auto Differential [[id number]]  (Abnormal) Collected:  08/16/20 1657    Specimen:  Blood Updated:  08/16/20 1737     WBC 6.73 10*3/mm3      RBC 3.54 10*6/mm3      Hemoglobin 10.3 g/dL      Hematocrit 31.2 %      MCV 88.1 fL      MCH 29.1 pg      MCHC 33.0 g/dL      RDW 13.1 %      RDW-SD 42.0 fl      MPV 11.8 fL      Platelets 260 10*3/mm3      Neutrophil % 68.1 %      Lymphocyte % 19.8 %      Monocyte % 8.3 %      Eosinophil % 2.5 %      Basophil % 0.9 %      Immature Grans % 0.4 %      Neutrophils, Absolute 4.58 10*3/mm3      Lymphocytes, Absolute 1.33 10*3/mm3      Monocytes, Absolute 0.56 10*3/mm3      Eosinophils, Absolute 0.17 10*3/mm3      Basophils, Absolute 0.06 10*3/mm3      Immature Grans, Absolute 0.03 10*3/mm3      nRBC 0.0 /100 WBC     Protime-INR [[id number]]  (Abnormal) Collected:  08/16/20  1659    Specimen:  Blood Updated:  08/16/20 1746     Protime 17.1 Seconds      INR 1.43    Blood Culture - Blood, Arm, Left [[id number]] Collected:  08/16/20 1659    Specimen:  Blood from Arm, Left Updated:  08/16/20 1730    Lactic Acid, Plasma [[id number]]  (Normal) Collected:  08/16/20 1659    Specimen:  Blood Updated:  08/16/20 1747     Lactate 1.2 mmol/L     Blood Culture - Blood, Arm, Left [[id number]] Collected:  08/16/20 1700    Specimen:  Blood from Arm, Left Updated:  08/16/20 1730    Urinalysis With Culture If Indicated - Urine, Clean Catch [[id number]]  (Normal) Collected:  08/16/20 1736    Specimen:  Urine, Clean Catch Updated:  08/16/20 1756     Color, UA Yellow     Appearance, UA Clear     pH, UA 7.5     Specific Gravity, UA 1.012     Glucose, UA Negative     Ketones, UA Negative     Bilirubin, UA Negative     Blood, UA Negative     Protein, UA Negative     Leuk Esterase, UA Negative     Nitrite, UA Negative     Urobilinogen, UA 1.0 E.U./dL    Narrative:       Urine microscopic not indicated.          XR Chest 1 View           Assessment/Plan      Active Hospital Problems    Diagnosis  POA   • **Dyspnea on exertion [R06.00]  Yes   • Atrial fibrillation (CMS/HCC) [I48.91]  Yes   • Hypothyroidism (acquired) [E03.9]  Yes   • GERD (gastroesophageal reflux disease) [K21.9]  Yes   • HLD (hyperlipidemia) [E78.5]  Yes   • HTN (hypertension) [I10]  Yes   • Pleural effusion on left [J90]  Yes   • Anemia [D64.9]  Yes   • Hypomagnesemia [E83.42]  Yes   • GI bleed [K92.2]  Yes   • Chronic diastolic CHF (congestive heart failure) (CMS/HCC) [I50.32]  Yes      Resolved Hospital Problems   No resolved problems to display.     Dyspnea on exertion/pleural effusion  -shortness of breath likely related to pleural effusion which has increased in size since prior imaging  -given lasix in ED, will await consults input and hold on additional IV dosing  -pulmonology consult    Anemia/GI bleed  -hemoccult positive stool in ED,  though no complaints of GI bleeding prior but may also be contributing to shortness of breath  -on eliquis for a fib, will hold for now  -GI consult  -trend H&H  -anemia studies pending    Hypomagnesemia  -likely related to diarrhea  -replaced in ED, will recheck in AM    A fib/GERD/HLD/HTN/dCHF  -hold home dose eliquis as per above but may continue home medications otherwise    VTE Ppx  -SCDs    CODE status  -full    I discussed the patients findings and my recommendations with patient.    ESTEVAN Garcia  [facility]  08/16/20  8:31 PM   (E4) spontaneous

## 2024-02-28 NOTE — ED ADULT NURSE NOTE - NSFALLHARMRISKINTERV_ED_ALL_ED
Assistance OOB with selected safe patient handling equipment if applicable/Communicate risk of Fall with Harm to all staff, patient, and family/Provide visual cue: red socks, yellow wristband, yellow gown, etc/Reinforce activity limits and safety measures with patient and family/Bed in lowest position, wheels locked, appropriate side rails in place/Call bell, personal items and telephone in reach/Instruct patient to call for assistance before getting out of bed/chair/stretcher/Non-slip footwear applied when patient is off stretcher/Cornish to call system/Physically safe environment - no spills, clutter or unnecessary equipment/Purposeful Proactive Rounding/Room/bathroom lighting operational, light cord in reach

## 2024-02-28 NOTE — ED STATDOCS - NSFOLLOWUPINSTRUCTIONS_ED_ALL_ED_FT
FOLLOW UP WITH SOM CROCKETT, AND YOUR SPINE DOCTOR THIS WEEK. CALL THE OFFICE TO MAKE AN APPOINTMENT. RETURN TO ER FOR ANY WORSENING SYMPTOMS OR NEW CONCERNS.     Wrist Pain, Adult  There are many things that can cause wrist pain. Some common causes include:  An injury to the wrist area, such as a sprain, strain, or fracture.Overuse of the joint.A condition that causes increased pressure on a nerve in the wrist (carpal tunnel syndrome).Wear and tear of the joints that occurs with aging (osteoarthritis).A variety of other types of arthritis.Sometimes, the cause of wrist pain is not known. Often, the pain goes away when you follow instructions from your health care provider for relieving pain at home, such as resting or icing the wrist. If your wrist pain continues, it is important to tell your health care provider.  Follow these instructions at home:  Rest the wrist area for at least 48 hours or as long as told by your health care provider.If a splint or elastic bandage has been applied, use it as told by your health care provider.  Remove the splint or bandage only as told by your health care provider.Loosen the splint or bandage if your fingers tingle, become numb, or turn cold or blue.If directed, apply ice to the injured area.  If you have a removable splint or elastic bandage, remove it as told by your health care provider.Put ice in a plastic bag.Place a towel between your skin and the bag or between your splint or bandage and the bag.Leave the ice on for 20 minutes, 2–3 times a day.Keep your arm raised (elevated) above the level of your heart while you are sitting or lying down.Take over-the-counter and prescription medicines only as told by your health care provider.Keep all follow-up visits as told by your health care provider. This is important.Contact a health care provider if:  You have a sudden sharp pain in the wrist, hand, or arm that is different or new.The swelling or bruising on your wrist or hand gets worse.Your skin becomes red, gets a rash, or has open sores.Your pain does not get better or it gets worse.Get help right away if:  You lose feeling in your fingers or hand.Your fingers turn white, very red, or cold and blue.You cannot move your fingers.You have a fever or chills.This information is not intended to replace advice given to you by your health care provider. Make sure you discuss any questions you have with your health care provider.

## 2024-02-28 NOTE — ED STATDOCS - CARE PROVIDERS DIRECT ADDRESSES
,[email or web address],[email or web address]

## 2024-02-28 NOTE — ED ADULT TRIAGE NOTE - CHIEF COMPLAINT QUOTE
Patient presents to ED ambulatory complaining of left knee, back, and right wrist pain s/p fall today. Patient states "my left knee gave out and I fell onto my back". Pt denies head strike. Patient presents to ED ambulatory complaining of left knee, back, and right wrist pain s/p fall today. Patient states "my left knee gave out and I fell onto my back". Pt denies head strike. Pt took 15mg Extended release oxycodone prior to arrival.

## 2024-02-28 NOTE — ED STATDOCS - CARE PLAN
1 Principal Discharge DX:	Upper extremity pain  Secondary Diagnosis:	Knee pain  Secondary Diagnosis:	Back pain  Secondary Diagnosis:	Fall

## 2024-02-28 NOTE — ED ADULT NURSE NOTE - CHIEF COMPLAINT QUOTE
Patient presents to ED ambulatory complaining of left knee, back, and right wrist pain s/p fall today. Patient states "my left knee gave out and I fell onto my back". Pt denies head strike. Pt took 15mg Extended release oxycodone prior to arrival.

## 2024-02-28 NOTE — ED STATDOCS - CARE PROVIDER_API CALL
Zuhair Rehman  Orthopaedic Surgery  [address] [place] [address]  Phone: [phone]  Fax: [phone]  Follow Up Time:     Ousmane Sarabia  Orthopaedic Surgery  [address] [place] [address]  Phone: [phone]  Fax: [phone]  Follow Up Time:     [facility]  Neurosurgery  [address]  [place] [address]  Phone: [phone]  Fax: [phone]  Follow Up Time:

## 2024-05-08 ENCOUNTER — APPOINTMENT (OUTPATIENT)
Dept: RADIOLOGY | Facility: CLINIC | Age: 54
End: 2024-05-08

## 2024-05-08 ENCOUNTER — APPOINTMENT (OUTPATIENT)
Dept: MRI IMAGING | Facility: CLINIC | Age: 54
End: 2024-05-08

## 2024-05-28 NOTE — ED ADULT TRIAGE NOTE - ARRIVAL FROM
Subjective     Patient ID: Estela Manzo is a 33 month old female.    Estela Manzo is a 33 month old female here for hospital follow up. Today she is accompanied by mother.     Chief Complaint   Patient presents with    ER F/U     Mom states no concerns      Hospital f/u   Admitted 5/20-5/26/24    discharge Diagnosis:Difficulty breathing  Primary Diagnosis: Human metapneumovirus (hMPV) pneumonia    Per mom, pt has been doing better since breathing  Still has heavy or labored breathing with activity    Has been giving symbicort 2 puffs twice a day    PO steroids were not available from pharmacy  Needed 2 doses after discharge  Gave 1 dose the night of discharge but did not have medicine for the following morning which would have been her last dose       Advised to give albuterol scheduled through yesterday and then as needed  Last dose last night  Has been fine this morning     Using spacer with inhaler     Appetite at baseline, eating well. Tolerating feeds  Voiding regularly       Hospital course per discharge summary:   Hospital Course:Floor Course  Estela is a [id number] ex 31wker with past medical history of bronchopulmonary dysplasia, Conti syndrome, developmental delay, and dysphagia (G-tube dependent), who presented with fevers, cough, and increased work of breathing, admitted for acute viral lower respiratory tract infection, found to have metapneumovirus.      On the floor patient was consistently tachypneic and tachycardic, patient was on 2 L/kg HF on 30% FiO2 with no changes to WOB. Patient continually had increased WOB with nasal flarring and tracheal tugging. Patient received x2 5mg continuous albuterol and x1 Mg. Patient was monitored additional hour after 2nd 5mg continuous albuterol, with no improvement to WOB. Decision was made to call RRT for ICU evaluation. ICU decided to transfer patient to PICU at this time.      PICU Course:  Continued on continuous albuterol and HFNC. Was  weaned to q2h but ultimately required to go back on continuous for work of breathing and started on CPT. Patient noted to be dehydrated so started on maint fluids until PO improved. RPP positive for metapneumovirus. Weaned albuterol and HFNC as able. Transferred to the floor for continued de-escalation of care.     Floor Course:  Patient arrived to the floor hemodynamically stable on 1.5 L/kg HFNC and albuterol Q2. Weaned to room air on 5/25, albuterol spaced to Q4. Started on on Symbicort. RT teaching done, AAP provided. Return precautions discussed, and patient discharged with close outpatient follow up recommended.          Patient's medications, allergies, past medical, surgical, social and family histories were reviewed and updated as appropriate.    Review of Systems   Constitutional:  Negative for appetite change and fever.   Respiratory:  Positive for cough. Negative for wheezing.    Genitourinary:  Negative for decreased urine volume.     Allergies: ALLERGIES:  Patient has no known allergies.       Objective    Visit Vitals  Pulse 135   Temp 98 °F (36.7 °C) (Temporal)   Resp 32   Ht 2' 9.62\" (0.854 m)   Wt 11.1 kg (24 lb 9.3 oz)   HC 48 cm (18.9\")   BMI 15.29 kg/m²       Growth percentiles: 69 %ile (Z= 0.48) based on Conti Syndrome (Girls, 2-19 Years) Stature-for-age data based on Stature recorded on 5/28/2024. 3 %ile (Z= -1.84) based on CDC (Girls, 0-36 Months) weight-for-age data using vitals from 5/28/2024.     Physical Exam  Vitals and nursing note reviewed.   Constitutional:       General: She is not in acute distress.  HENT:      Head: Atraumatic.      Ears:      Comments: L tube out, in ear canal.  R tube covered with wax.  TM nonerythematous, nonbulging. No ear drainage     Nose: Congestion present.      Mouth/Throat:      Mouth: Mucous membranes are moist.      Pharynx: Oropharynx is clear.   Eyes:      General:         Right eye: No discharge.         Left eye: No discharge.       Conjunctiva/sclera: Conjunctivae normal.      Pupils: Pupils are equal, round, and reactive to light.   Cardiovascular:      Rate and Rhythm: Normal rate and regular rhythm.      Heart sounds: No murmur heard.  Pulmonary:      Effort: Pulmonary effort is normal.      Breath sounds: Normal breath sounds and air entry.   Musculoskeletal:      Cervical back: Normal range of motion.   Lymphadenopathy:      Cervical: No cervical adenopathy.   Skin:     General: Skin is warm.      Findings: No rash.   Neurological:      Mental Status: She is alert.         Assessment and Plan      Problem List Items Addressed This Visit          Chromosomal and Congenital    Mosaic Conti syndrome (CMD)       Neuro    Global developmental delay       Pulmonary and Pneumonias    BPD (bronchopulmonary dysplasia)  (CMD)    Relevant Orders    SERVICE TO PEDIATRIC PULMONOLOGY    Human metapneumovirus (hMPV) pneumonia     Other Visit Diagnoses       Hospital discharge follow-up    -  Primary            33mo ex 29wk F with Conti Syndrome, BPD, developmental delay, dysphagia, G-tube dependence here for hospital follow up for difficulty breathing 2/2 human metapneumovirus pneumonia, requiring continuous albuterol, HFNC. Pt started on symbicort during admission. Today, mother reports pt is clinically improved. Has been giving symbicort as prescribed. Last dose of albuterol last night. Lung exam normal today.      Plan  -continue symbicort as prescribed  -continue albuterol every 4 hours as needed  -needs pulmonology f/u, advised to call to schedule   -follow up for well check as scheduled or sooner with concerns     TRACK: 0    History obtained from: mother    Personally reviewed: hospital documentation, lab results, CXR results      Instructed to call if problem worsens or does not improve as discussed, otherwise follow-up  for routine well care          Home

## 2024-07-11 NOTE — ED ADULT NURSE NOTE - NSFALLRSKASSESSDT_ED_ALL_ED
Script refill faxed. Remind pt to do follow up for med check if needing it more frequently       31-Jan-2019 13:57
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
## 2024-12-23 NOTE — ED PROVIDER NOTE - NS ED MD DISPO DISCHARGE
Pt called in requesting appt. For shoulder pain not knowing if she pulled something but cannot extend arm above head without pain.       No acute openings. Please advise.    Home

## 2025-05-27 NOTE — ED STATDOCS - ATTENDING APP SHARED VISIT CONTRIBUTION OF CARE
Her/She I, Yarely Wheeler MD, performed the initial face to face bedside interview with this patient regarding history of present illness, review of symptoms and relevant past medical, social and family history.  I completed an independent physical examination.  I was the initial provider who evaluated this patient. I have signed out the follow up of any pending tests (i.e. labs, radiological studies) to the ACP.  I have communicated the patient’s plan of care and disposition with the ACP.  The history, relevant review of systems, past medical and surgical history, medical decision making, and physical examination was documented by the scribe in my presence and I attest to the accuracy of the documentation.

## 2025-08-18 ENCOUNTER — EMERGENCY (EMERGENCY)
Facility: HOSPITAL | Age: 55
LOS: 0 days | Discharge: HOME | End: 2025-08-19
Attending: STUDENT IN AN ORGANIZED HEALTH CARE EDUCATION/TRAINING PROGRAM
Payer: COMMERCIAL

## 2025-08-18 VITALS
TEMPERATURE: 100 F | OXYGEN SATURATION: 98 % | DIASTOLIC BLOOD PRESSURE: 82 MMHG | HEART RATE: 81 BPM | RESPIRATION RATE: 18 BRPM | SYSTOLIC BLOOD PRESSURE: 131 MMHG | WEIGHT: 184.53 LBS

## 2025-08-18 DIAGNOSIS — G56.01 CARPAL TUNNEL SYNDROME, RIGHT UPPER LIMB: Chronic | ICD-10-CM

## 2025-08-18 DIAGNOSIS — Z98.1 ARTHRODESIS STATUS: Chronic | ICD-10-CM

## 2025-08-18 DIAGNOSIS — Z90.49 ACQUIRED ABSENCE OF OTHER SPECIFIED PARTS OF DIGESTIVE TRACT: Chronic | ICD-10-CM

## 2025-08-18 DIAGNOSIS — Z90.710 ACQUIRED ABSENCE OF BOTH CERVIX AND UTERUS: ICD-10-CM

## 2025-08-18 DIAGNOSIS — E87.6 HYPOKALEMIA: ICD-10-CM

## 2025-08-18 DIAGNOSIS — R11.2 NAUSEA WITH VOMITING, UNSPECIFIED: ICD-10-CM

## 2025-08-18 DIAGNOSIS — G56.02 CARPAL TUNNEL SYNDROME, LEFT UPPER LIMB: Chronic | ICD-10-CM

## 2025-08-18 DIAGNOSIS — Z98.891 HISTORY OF UTERINE SCAR FROM PREVIOUS SURGERY: Chronic | ICD-10-CM

## 2025-08-18 DIAGNOSIS — N60.09 SOLITARY CYST OF UNSPECIFIED BREAST: Chronic | ICD-10-CM

## 2025-08-18 DIAGNOSIS — Z90.710 ACQUIRED ABSENCE OF BOTH CERVIX AND UTERUS: Chronic | ICD-10-CM

## 2025-08-18 DIAGNOSIS — D72.829 ELEVATED WHITE BLOOD CELL COUNT, UNSPECIFIED: ICD-10-CM

## 2025-08-18 DIAGNOSIS — Z98.890 OTHER SPECIFIED POSTPROCEDURAL STATES: Chronic | ICD-10-CM

## 2025-08-18 LAB
ALBUMIN SERPL ELPH-MCNC: 4 G/DL — SIGNIFICANT CHANGE UP (ref 3.3–5)
ALP SERPL-CCNC: 81 U/L — SIGNIFICANT CHANGE UP (ref 40–120)
ALT FLD-CCNC: 28 U/L — SIGNIFICANT CHANGE UP (ref 12–78)
ANION GAP SERPL CALC-SCNC: 4 MMOL/L — LOW (ref 5–17)
APTT BLD: 30.1 SEC — SIGNIFICANT CHANGE UP (ref 26.1–36.8)
AST SERPL-CCNC: 11 U/L — LOW (ref 15–37)
BASOPHILS # BLD AUTO: 0.04 K/UL — SIGNIFICANT CHANGE UP (ref 0–0.2)
BASOPHILS NFR BLD AUTO: 0.3 % — SIGNIFICANT CHANGE UP (ref 0–2)
BILIRUB SERPL-MCNC: 0.8 MG/DL — SIGNIFICANT CHANGE UP (ref 0.2–1.2)
BUN SERPL-MCNC: 8 MG/DL — SIGNIFICANT CHANGE UP (ref 7–23)
CALCIUM SERPL-MCNC: 9.1 MG/DL — SIGNIFICANT CHANGE UP (ref 8.5–10.1)
CHLORIDE SERPL-SCNC: 103 MMOL/L — SIGNIFICANT CHANGE UP (ref 96–108)
CO2 SERPL-SCNC: 30 MMOL/L — SIGNIFICANT CHANGE UP (ref 22–31)
CREAT SERPL-MCNC: 0.51 MG/DL — SIGNIFICANT CHANGE UP (ref 0.5–1.3)
EGFR: 112 ML/MIN/1.73M2 — SIGNIFICANT CHANGE UP
EGFR: 112 ML/MIN/1.73M2 — SIGNIFICANT CHANGE UP
EOSINOPHIL # BLD AUTO: 0.04 K/UL — SIGNIFICANT CHANGE UP (ref 0–0.5)
EOSINOPHIL NFR BLD AUTO: 0.3 % — SIGNIFICANT CHANGE UP (ref 0–6)
GLUCOSE SERPL-MCNC: 111 MG/DL — HIGH (ref 70–99)
HCT VFR BLD CALC: 44.7 % — SIGNIFICANT CHANGE UP (ref 34.5–45)
HGB BLD-MCNC: 15 G/DL — SIGNIFICANT CHANGE UP (ref 11.5–15.5)
IMM GRANULOCYTES # BLD AUTO: 0.09 K/UL — HIGH (ref 0–0.07)
IMM GRANULOCYTES NFR BLD AUTO: 0.6 % — SIGNIFICANT CHANGE UP (ref 0–0.9)
INR BLD: 1.02 RATIO — SIGNIFICANT CHANGE UP (ref 0.85–1.16)
LACTATE SERPL-SCNC: 0.8 MMOL/L — SIGNIFICANT CHANGE UP (ref 0.7–2)
LIDOCAIN IGE QN: 28 U/L — SIGNIFICANT CHANGE UP (ref 13–75)
LYMPHOCYTES # BLD AUTO: 2.23 K/UL — SIGNIFICANT CHANGE UP (ref 1–3.3)
LYMPHOCYTES NFR BLD AUTO: 15.4 % — SIGNIFICANT CHANGE UP (ref 13–44)
MCHC RBC-ENTMCNC: 29.1 PG — SIGNIFICANT CHANGE UP (ref 27–34)
MCHC RBC-ENTMCNC: 33.6 G/DL — SIGNIFICANT CHANGE UP (ref 32–36)
MCV RBC AUTO: 86.6 FL — SIGNIFICANT CHANGE UP (ref 80–100)
MONOCYTES # BLD AUTO: 0.84 K/UL — SIGNIFICANT CHANGE UP (ref 0–0.9)
MONOCYTES NFR BLD AUTO: 5.8 % — SIGNIFICANT CHANGE UP (ref 2–14)
NEUTROPHILS # BLD AUTO: 11.22 K/UL — HIGH (ref 1.8–7.4)
NEUTROPHILS NFR BLD AUTO: 77.6 % — HIGH (ref 43–77)
NRBC # BLD AUTO: 0 K/UL — SIGNIFICANT CHANGE UP (ref 0–0)
NRBC # FLD: 0 K/UL — SIGNIFICANT CHANGE UP (ref 0–0)
NRBC BLD AUTO-RTO: 0 /100 WBCS — SIGNIFICANT CHANGE UP (ref 0–0)
PLATELET # BLD AUTO: 403 K/UL — HIGH (ref 150–400)
PMV BLD: 9.2 FL — SIGNIFICANT CHANGE UP (ref 7–13)
POTASSIUM SERPL-MCNC: 3.3 MMOL/L — LOW (ref 3.5–5.3)
POTASSIUM SERPL-SCNC: 3.3 MMOL/L — LOW (ref 3.5–5.3)
PROT SERPL-MCNC: 7.6 GM/DL — SIGNIFICANT CHANGE UP (ref 6–8.3)
PROTHROM AB SERPL-ACNC: 11.8 SEC — SIGNIFICANT CHANGE UP (ref 9.9–13.4)
RBC # BLD: 5.16 M/UL — SIGNIFICANT CHANGE UP (ref 3.8–5.2)
RBC # FLD: 13.2 % — SIGNIFICANT CHANGE UP (ref 10.3–14.5)
SODIUM SERPL-SCNC: 137 MMOL/L — SIGNIFICANT CHANGE UP (ref 135–145)
WBC # BLD: 14.46 K/UL — HIGH (ref 3.8–10.5)
WBC # FLD AUTO: 14.46 K/UL — HIGH (ref 3.8–10.5)

## 2025-08-18 PROCEDURE — 74177 CT ABD & PELVIS W/CONTRAST: CPT

## 2025-08-18 PROCEDURE — 87150 DNA/RNA AMPLIFIED PROBE: CPT

## 2025-08-18 PROCEDURE — 80053 COMPREHEN METABOLIC PANEL: CPT

## 2025-08-18 PROCEDURE — 85025 COMPLETE CBC W/AUTO DIFF WBC: CPT

## 2025-08-18 PROCEDURE — 74177 CT ABD & PELVIS W/CONTRAST: CPT | Mod: 26

## 2025-08-18 PROCEDURE — 85610 PROTHROMBIN TIME: CPT

## 2025-08-18 PROCEDURE — 87077 CULTURE AEROBIC IDENTIFY: CPT

## 2025-08-18 PROCEDURE — 81001 URINALYSIS AUTO W/SCOPE: CPT

## 2025-08-18 PROCEDURE — 36415 COLL VENOUS BLD VENIPUNCTURE: CPT

## 2025-08-18 PROCEDURE — 87040 BLOOD CULTURE FOR BACTERIA: CPT

## 2025-08-18 PROCEDURE — 99285 EMERGENCY DEPT VISIT HI MDM: CPT | Mod: 25

## 2025-08-18 PROCEDURE — 85730 THROMBOPLASTIN TIME PARTIAL: CPT

## 2025-08-18 PROCEDURE — 99285 EMERGENCY DEPT VISIT HI MDM: CPT

## 2025-08-18 PROCEDURE — 96374 THER/PROPH/DIAG INJ IV PUSH: CPT | Mod: XU

## 2025-08-18 PROCEDURE — 83605 ASSAY OF LACTIC ACID: CPT

## 2025-08-18 PROCEDURE — 93010 ELECTROCARDIOGRAM REPORT: CPT

## 2025-08-18 PROCEDURE — 83690 ASSAY OF LIPASE: CPT

## 2025-08-18 PROCEDURE — 96375 TX/PRO/DX INJ NEW DRUG ADDON: CPT

## 2025-08-18 PROCEDURE — 93005 ELECTROCARDIOGRAM TRACING: CPT

## 2025-08-18 RX ORDER — ONDANSETRON HCL/PF 4 MG/2 ML
4 VIAL (ML) INJECTION ONCE
Refills: 0 | Status: COMPLETED | OUTPATIENT
Start: 2025-08-18 | End: 2025-08-18

## 2025-08-18 RX ORDER — ACETAMINOPHEN 500 MG/5ML
1000 LIQUID (ML) ORAL ONCE
Refills: 0 | Status: COMPLETED | OUTPATIENT
Start: 2025-08-18 | End: 2025-08-18

## 2025-08-18 RX ADMIN — Medication 2600 MILLILITER(S): at 23:34

## 2025-08-18 RX ADMIN — Medication 4 MILLIGRAM(S): at 22:46

## 2025-08-18 RX ADMIN — Medication 400 MILLIGRAM(S): at 23:34

## 2025-08-19 VITALS
TEMPERATURE: 99 F | HEART RATE: 79 BPM | OXYGEN SATURATION: 99 % | SYSTOLIC BLOOD PRESSURE: 118 MMHG | RESPIRATION RATE: 16 BRPM | DIASTOLIC BLOOD PRESSURE: 73 MMHG

## 2025-08-19 LAB
APPEARANCE UR: CLEAR — SIGNIFICANT CHANGE UP
BACTERIA # UR AUTO: ABNORMAL /HPF
BILIRUB UR-MCNC: NEGATIVE — SIGNIFICANT CHANGE UP
CAST: 0 /LPF — SIGNIFICANT CHANGE UP (ref 0–4)
COLOR SPEC: YELLOW — SIGNIFICANT CHANGE UP
DIFF PNL FLD: NEGATIVE — SIGNIFICANT CHANGE UP
GLUCOSE UR QL: NEGATIVE MG/DL — SIGNIFICANT CHANGE UP
GRAM STN FLD: ABNORMAL
KETONES UR QL: ABNORMAL MG/DL
LEUKOCYTE ESTERASE UR-ACNC: NEGATIVE — SIGNIFICANT CHANGE UP
NITRITE UR-MCNC: NEGATIVE — SIGNIFICANT CHANGE UP
PH UR: 7 — SIGNIFICANT CHANGE UP (ref 5–8)
PROT UR-MCNC: 30 MG/DL
RBC CASTS # UR COMP ASSIST: 2 /HPF — SIGNIFICANT CHANGE UP (ref 0–4)
SP GR SPEC: 1.01 — SIGNIFICANT CHANGE UP (ref 1–1.03)
SPECIMEN SOURCE: SIGNIFICANT CHANGE UP
SQUAMOUS # UR AUTO: 11 /HPF — HIGH (ref 0–5)
UROBILINOGEN FLD QL: 1 MG/DL — SIGNIFICANT CHANGE UP (ref 0.2–1)
WBC UR QL: 4 /HPF — SIGNIFICANT CHANGE UP (ref 0–5)

## 2025-08-19 RX ORDER — ONDANSETRON HCL/PF 4 MG/2 ML
1 VIAL (ML) INJECTION
Qty: 15 | Refills: 0
Start: 2025-08-19 | End: 2025-08-23

## 2025-08-19 RX ORDER — HALOPERIDOL 10 MG/1
2.5 TABLET ORAL ONCE
Refills: 0 | Status: COMPLETED | OUTPATIENT
Start: 2025-08-19 | End: 2025-08-19

## 2025-08-19 RX ADMIN — HALOPERIDOL 2.5 MILLIGRAM(S): 10 TABLET ORAL at 00:42

## 2025-08-19 RX ADMIN — Medication 4 MILLIGRAM(S): at 00:42

## 2025-08-20 LAB
-  COAGULASE NEGATIVE STAPHYLOCOCCUS: SIGNIFICANT CHANGE UP
CULTURE RESULTS: ABNORMAL
METHOD TYPE: SIGNIFICANT CHANGE UP
ORGANISM # SPEC MICROSCOPIC CNT: ABNORMAL
ORGANISM # SPEC MICROSCOPIC CNT: SIGNIFICANT CHANGE UP
SPECIMEN SOURCE: SIGNIFICANT CHANGE UP

## 2025-08-24 ENCOUNTER — EMERGENCY (EMERGENCY)
Facility: HOSPITAL | Age: 55
LOS: 0 days | Discharge: HOME | End: 2025-08-24
Attending: EMERGENCY MEDICINE
Payer: COMMERCIAL

## 2025-08-24 VITALS — WEIGHT: 202.16 LBS

## 2025-08-24 VITALS
OXYGEN SATURATION: 98 % | HEART RATE: 88 BPM | RESPIRATION RATE: 18 BRPM | TEMPERATURE: 98 F | SYSTOLIC BLOOD PRESSURE: 128 MMHG | DIASTOLIC BLOOD PRESSURE: 74 MMHG

## 2025-08-24 DIAGNOSIS — G56.01 CARPAL TUNNEL SYNDROME, RIGHT UPPER LIMB: Chronic | ICD-10-CM

## 2025-08-24 DIAGNOSIS — R78.81 BACTEREMIA: ICD-10-CM

## 2025-08-24 DIAGNOSIS — Z90.710 ACQUIRED ABSENCE OF BOTH CERVIX AND UTERUS: Chronic | ICD-10-CM

## 2025-08-24 DIAGNOSIS — Z98.891 HISTORY OF UTERINE SCAR FROM PREVIOUS SURGERY: Chronic | ICD-10-CM

## 2025-08-24 DIAGNOSIS — N60.09 SOLITARY CYST OF UNSPECIFIED BREAST: Chronic | ICD-10-CM

## 2025-08-24 DIAGNOSIS — Z90.49 ACQUIRED ABSENCE OF OTHER SPECIFIED PARTS OF DIGESTIVE TRACT: Chronic | ICD-10-CM

## 2025-08-24 DIAGNOSIS — G56.02 CARPAL TUNNEL SYNDROME, LEFT UPPER LIMB: Chronic | ICD-10-CM

## 2025-08-24 DIAGNOSIS — Z98.1 ARTHRODESIS STATUS: Chronic | ICD-10-CM

## 2025-08-24 DIAGNOSIS — Z98.890 OTHER SPECIFIED POSTPROCEDURAL STATES: Chronic | ICD-10-CM

## 2025-08-24 LAB
ALBUMIN SERPL ELPH-MCNC: 3.5 G/DL — SIGNIFICANT CHANGE UP (ref 3.3–5)
ALP SERPL-CCNC: 65 U/L — SIGNIFICANT CHANGE UP (ref 40–120)
ALT FLD-CCNC: 16 U/L — SIGNIFICANT CHANGE UP (ref 12–78)
ANION GAP SERPL CALC-SCNC: 5 MMOL/L — SIGNIFICANT CHANGE UP (ref 5–17)
AST SERPL-CCNC: 7 U/L — LOW (ref 15–37)
BASOPHILS # BLD AUTO: 0.05 K/UL — SIGNIFICANT CHANGE UP (ref 0–0.2)
BASOPHILS NFR BLD AUTO: 0.4 % — SIGNIFICANT CHANGE UP (ref 0–2)
BILIRUB SERPL-MCNC: 0.2 MG/DL — SIGNIFICANT CHANGE UP (ref 0.2–1.2)
BUN SERPL-MCNC: 6 MG/DL — LOW (ref 7–23)
CALCIUM SERPL-MCNC: 8.7 MG/DL — SIGNIFICANT CHANGE UP (ref 8.5–10.1)
CHLORIDE SERPL-SCNC: 103 MMOL/L — SIGNIFICANT CHANGE UP (ref 96–108)
CO2 SERPL-SCNC: 28 MMOL/L — SIGNIFICANT CHANGE UP (ref 22–31)
CREAT SERPL-MCNC: 0.59 MG/DL — SIGNIFICANT CHANGE UP (ref 0.5–1.3)
CULTURE RESULTS: SIGNIFICANT CHANGE UP
EGFR: 108 ML/MIN/1.73M2 — SIGNIFICANT CHANGE UP
EGFR: 108 ML/MIN/1.73M2 — SIGNIFICANT CHANGE UP
EOSINOPHIL # BLD AUTO: 0.42 K/UL — SIGNIFICANT CHANGE UP (ref 0–0.5)
EOSINOPHIL NFR BLD AUTO: 3.4 % — SIGNIFICANT CHANGE UP (ref 0–6)
GLUCOSE SERPL-MCNC: 103 MG/DL — HIGH (ref 70–99)
HCT VFR BLD CALC: 37.7 % — SIGNIFICANT CHANGE UP (ref 34.5–45)
HGB BLD-MCNC: 12.5 G/DL — SIGNIFICANT CHANGE UP (ref 11.5–15.5)
IMM GRANULOCYTES # BLD AUTO: 0.06 K/UL — SIGNIFICANT CHANGE UP (ref 0–0.07)
IMM GRANULOCYTES NFR BLD AUTO: 0.5 % — SIGNIFICANT CHANGE UP (ref 0–0.9)
LACTATE SERPL-SCNC: 1.4 MMOL/L — SIGNIFICANT CHANGE UP (ref 0.7–2)
LIDOCAIN IGE QN: 25 U/L — SIGNIFICANT CHANGE UP (ref 13–75)
LYMPHOCYTES # BLD AUTO: 3.72 K/UL — HIGH (ref 1–3.3)
LYMPHOCYTES NFR BLD AUTO: 30.3 % — SIGNIFICANT CHANGE UP (ref 13–44)
MAGNESIUM SERPL-MCNC: 1.8 MG/DL — SIGNIFICANT CHANGE UP (ref 1.6–2.6)
MCHC RBC-ENTMCNC: 29.6 PG — SIGNIFICANT CHANGE UP (ref 27–34)
MCHC RBC-ENTMCNC: 33.2 G/DL — SIGNIFICANT CHANGE UP (ref 32–36)
MCV RBC AUTO: 89.3 FL — SIGNIFICANT CHANGE UP (ref 80–100)
MONOCYTES # BLD AUTO: 0.81 K/UL — SIGNIFICANT CHANGE UP (ref 0–0.9)
MONOCYTES NFR BLD AUTO: 6.6 % — SIGNIFICANT CHANGE UP (ref 2–14)
NEUTROPHILS # BLD AUTO: 7.21 K/UL — SIGNIFICANT CHANGE UP (ref 1.8–7.4)
NEUTROPHILS NFR BLD AUTO: 58.8 % — SIGNIFICANT CHANGE UP (ref 43–77)
NRBC # BLD AUTO: 0 K/UL — SIGNIFICANT CHANGE UP (ref 0–0)
NRBC # FLD: 0 K/UL — SIGNIFICANT CHANGE UP (ref 0–0)
NRBC BLD AUTO-RTO: 0 /100 WBCS — SIGNIFICANT CHANGE UP (ref 0–0)
PHOSPHATE SERPL-MCNC: 4.3 MG/DL — SIGNIFICANT CHANGE UP (ref 2.5–4.5)
PLATELET # BLD AUTO: 369 K/UL — SIGNIFICANT CHANGE UP (ref 150–400)
PMV BLD: 9.2 FL — SIGNIFICANT CHANGE UP (ref 7–13)
POTASSIUM SERPL-MCNC: 3.7 MMOL/L — SIGNIFICANT CHANGE UP (ref 3.5–5.3)
POTASSIUM SERPL-SCNC: 3.7 MMOL/L — SIGNIFICANT CHANGE UP (ref 3.5–5.3)
PROT SERPL-MCNC: 6.4 GM/DL — SIGNIFICANT CHANGE UP (ref 6–8.3)
RBC # BLD: 4.22 M/UL — SIGNIFICANT CHANGE UP (ref 3.8–5.2)
RBC # FLD: 13.9 % — SIGNIFICANT CHANGE UP (ref 10.3–14.5)
SODIUM SERPL-SCNC: 136 MMOL/L — SIGNIFICANT CHANGE UP (ref 135–145)
SPECIMEN SOURCE: SIGNIFICANT CHANGE UP
WBC # BLD: 12.27 K/UL — HIGH (ref 3.8–10.5)
WBC # FLD AUTO: 12.27 K/UL — HIGH (ref 3.8–10.5)

## 2025-08-24 PROCEDURE — 85025 COMPLETE CBC W/AUTO DIFF WBC: CPT

## 2025-08-24 PROCEDURE — 83605 ASSAY OF LACTIC ACID: CPT

## 2025-08-24 PROCEDURE — 80053 COMPREHEN METABOLIC PANEL: CPT

## 2025-08-24 PROCEDURE — 83690 ASSAY OF LIPASE: CPT

## 2025-08-24 PROCEDURE — 99283 EMERGENCY DEPT VISIT LOW MDM: CPT

## 2025-08-24 PROCEDURE — 87040 BLOOD CULTURE FOR BACTERIA: CPT

## 2025-08-24 PROCEDURE — 84100 ASSAY OF PHOSPHORUS: CPT

## 2025-08-24 PROCEDURE — 36415 COLL VENOUS BLD VENIPUNCTURE: CPT

## 2025-08-24 PROCEDURE — 99284 EMERGENCY DEPT VISIT MOD MDM: CPT

## 2025-08-24 PROCEDURE — 83735 ASSAY OF MAGNESIUM: CPT

## 2025-08-24 RX ORDER — SODIUM CHLORIDE 9 G/1000ML
1000 INJECTION, SOLUTION INTRAVENOUS ONCE
Refills: 0 | Status: COMPLETED | OUTPATIENT
Start: 2025-08-24 | End: 2025-08-24

## 2025-08-24 RX ADMIN — SODIUM CHLORIDE 1000 MILLILITER(S): 9 INJECTION, SOLUTION INTRAVENOUS at 12:45
